# Patient Record
Sex: MALE | Race: WHITE | Employment: UNEMPLOYED | URBAN - METROPOLITAN AREA
[De-identification: names, ages, dates, MRNs, and addresses within clinical notes are randomized per-mention and may not be internally consistent; named-entity substitution may affect disease eponyms.]

---

## 2018-10-30 ENCOUNTER — APPOINTMENT (OUTPATIENT)
Dept: GENERAL RADIOLOGY | Age: 48
DRG: 480 | End: 2018-10-30
Payer: MEDICARE

## 2018-10-30 ENCOUNTER — HOSPITAL ENCOUNTER (EMERGENCY)
Age: 48
Discharge: HOME HEALTH | DRG: 480 | End: 2018-10-30
Attending: EMERGENCY MEDICINE
Payer: MEDICARE

## 2018-10-30 VITALS
SYSTOLIC BLOOD PRESSURE: 113 MMHG | DIASTOLIC BLOOD PRESSURE: 69 MMHG | OXYGEN SATURATION: 98 % | HEART RATE: 66 BPM | BODY MASS INDEX: 18.75 KG/M2 | TEMPERATURE: 97.7 F | RESPIRATION RATE: 14 BRPM | WEIGHT: 150 LBS

## 2018-10-30 DIAGNOSIS — R21 RASH: Primary | ICD-10-CM

## 2018-10-30 DIAGNOSIS — L03.115 CELLULITIS OF RIGHT LOWER EXTREMITY: ICD-10-CM

## 2018-10-30 PROCEDURE — 73560 X-RAY EXAM OF KNEE 1 OR 2: CPT

## 2018-10-30 PROCEDURE — 99284 EMERGENCY DEPT VISIT MOD MDM: CPT

## 2018-10-30 RX ORDER — CEPHALEXIN 500 MG/1
500 CAPSULE ORAL 4 TIMES DAILY
Qty: 40 CAPSULE | Refills: 0 | Status: ON HOLD | OUTPATIENT
Start: 2018-10-30 | End: 2018-11-07 | Stop reason: HOSPADM

## 2018-10-30 ASSESSMENT — ENCOUNTER SYMPTOMS
CONSTIPATION: 0
COLOR CHANGE: 0
DIARRHEA: 0
COUGH: 0
ABDOMINAL DISTENTION: 0
SHORTNESS OF BREATH: 0
STRIDOR: 0
VOMITING: 0
WHEEZING: 0

## 2018-10-30 NOTE — ED NOTES
Bed: 08  Expected date:   Expected time:   Means of arrival: [facility]  Comments:  Medic 225-MRDD  Nonverbal weakness         Priyanka Christian RN  10/30/18 1960

## 2018-10-30 NOTE — ED PROVIDER NOTES
Pulses:       Femoral pulses are 2+ on the right side, and 2+ on the left side. Dorsalis pedis pulses are 2+ on the right side, and 2+ on the left side. Posterior tibial pulses are 2+ on the right side, and 2+ on the left side. Pulmonary/Chest: Effort normal and breath sounds normal. No stridor. Abdominal: Soft. Bowel sounds are normal. He exhibits no distension. There is no tenderness. Musculoskeletal: Normal range of motion. Lymphadenopathy:     He has no cervical adenopathy. Neurological: He is alert and oriented to person, place, and time. Skin: Skin is warm and dry. Capillary refill takes less than 2 seconds. Burn and rash noted. No bruising, no ecchymosis, no laceration, no lesion and no purpura noted. Rash is not nodular, not pustular and not vesicular. He is not diaphoretic. There is erythema (well demarcated rash behind the right knee and lightly disseminated on the right medial/anterior aspect). No cyanosis. No pallor. Nails show no clubbing. Light chronic appearing discoloration present on both feet. No tenderness to palpation. No extremity edema, posterior calf or thigh tenderness, palpable cord, discoloration. Psychiatric: He has a normal mood and affect. His behavior is normal.   Nursing note and vitals reviewed. DIAGNOSTIC RESULTS   LABS:    Labs Reviewed - No data to display    All other labs were within normal range or not returned as of this dictation. EKG: All EKG's are interpreted by the Emergency Department Physician who either signs orCo-signs this chart in the absence of a cardiologist.  Please see their note for interpretation of EKG.       RADIOLOGY:   Non-plain film images such as CT, Ultrasound and MRI are read by the radiologist. Plain radiographic images are visualized andpreliminarily interpreted by the  ED Provider with the below findings:        Interpretation pert Radiologist below, if available at the time of this note:    No orders to

## 2018-11-02 ENCOUNTER — APPOINTMENT (OUTPATIENT)
Dept: GENERAL RADIOLOGY | Age: 48
DRG: 480 | End: 2018-11-02
Payer: MEDICARE

## 2018-11-02 ENCOUNTER — ANESTHESIA EVENT (OUTPATIENT)
Dept: OPERATING ROOM | Age: 48
DRG: 480 | End: 2018-11-02
Payer: MEDICARE

## 2018-11-02 ENCOUNTER — HOSPITAL ENCOUNTER (INPATIENT)
Age: 48
LOS: 6 days | Discharge: NURSING, REHAB OR LONG-TERM CARE | DRG: 480 | End: 2018-11-08
Attending: EMERGENCY MEDICINE | Admitting: FAMILY MEDICINE
Payer: MEDICARE

## 2018-11-02 DIAGNOSIS — S72.001A CLOSED FRACTURE OF RIGHT HIP, INITIAL ENCOUNTER (HCC): Primary | ICD-10-CM

## 2018-11-02 DIAGNOSIS — F72 MENTAL RETARDATION, SEVERE (I.Q. 20-34): ICD-10-CM

## 2018-11-02 PROBLEM — S72.061A: Status: ACTIVE | Noted: 2018-11-02

## 2018-11-02 LAB
ANION GAP SERPL CALCULATED.3IONS-SCNC: 4 MMOL/L (ref 3–16)
BASOPHILS ABSOLUTE: 0.1 K/UL (ref 0–0.2)
BASOPHILS RELATIVE PERCENT: 0.4 %
BUN BLDV-MCNC: 49 MG/DL (ref 7–20)
CALCIUM SERPL-MCNC: 8.7 MG/DL (ref 8.3–10.6)
CHLORIDE BLD-SCNC: 108 MMOL/L (ref 99–110)
CO2: 31 MMOL/L (ref 21–32)
CREAT SERPL-MCNC: 1 MG/DL (ref 0.9–1.3)
EOSINOPHILS ABSOLUTE: 0 K/UL (ref 0–0.6)
EOSINOPHILS RELATIVE PERCENT: 0 %
GFR AFRICAN AMERICAN: >60
GFR NON-AFRICAN AMERICAN: >60
GLUCOSE BLD-MCNC: 144 MG/DL (ref 70–99)
HCT VFR BLD CALC: 35.7 % (ref 40.5–52.5)
HEMOGLOBIN: 11.7 G/DL (ref 13.5–17.5)
LYMPHOCYTES ABSOLUTE: 0.3 K/UL (ref 1–5.1)
LYMPHOCYTES RELATIVE PERCENT: 2.1 %
MCH RBC QN AUTO: 31.9 PG (ref 26–34)
MCHC RBC AUTO-ENTMCNC: 32.9 G/DL (ref 31–36)
MCV RBC AUTO: 96.9 FL (ref 80–100)
MONOCYTES ABSOLUTE: 1.2 K/UL (ref 0–1.3)
MONOCYTES RELATIVE PERCENT: 8.2 %
NEUTROPHILS ABSOLUTE: 13 K/UL (ref 1.7–7.7)
NEUTROPHILS RELATIVE PERCENT: 89.3 %
PDW BLD-RTO: 14.2 % (ref 12.4–15.4)
PLATELET # BLD: 122 K/UL (ref 135–450)
PMV BLD AUTO: 12.7 FL (ref 5–10.5)
POTASSIUM SERPL-SCNC: 4 MMOL/L (ref 3.5–5.1)
RBC # BLD: 3.68 M/UL (ref 4.2–5.9)
SODIUM BLD-SCNC: 143 MMOL/L (ref 136–145)
WBC # BLD: 14.5 K/UL (ref 4–11)

## 2018-11-02 PROCEDURE — 73502 X-RAY EXAM HIP UNI 2-3 VIEWS: CPT

## 2018-11-02 PROCEDURE — 2580000003 HC RX 258: Performed by: PHYSICIAN ASSISTANT

## 2018-11-02 PROCEDURE — 96374 THER/PROPH/DIAG INJ IV PUSH: CPT

## 2018-11-02 PROCEDURE — 80048 BASIC METABOLIC PNL TOTAL CA: CPT

## 2018-11-02 PROCEDURE — 96361 HYDRATE IV INFUSION ADD-ON: CPT

## 2018-11-02 PROCEDURE — 96375 TX/PRO/DX INJ NEW DRUG ADDON: CPT

## 2018-11-02 PROCEDURE — 6360000002 HC RX W HCPCS: Performed by: PHYSICIAN ASSISTANT

## 2018-11-02 PROCEDURE — 93005 ELECTROCARDIOGRAM TRACING: CPT | Performed by: PHYSICIAN ASSISTANT

## 2018-11-02 PROCEDURE — 99285 EMERGENCY DEPT VISIT HI MDM: CPT

## 2018-11-02 PROCEDURE — 96376 TX/PRO/DX INJ SAME DRUG ADON: CPT

## 2018-11-02 PROCEDURE — 71045 X-RAY EXAM CHEST 1 VIEW: CPT

## 2018-11-02 PROCEDURE — 2580000003 HC RX 258: Performed by: FAMILY MEDICINE

## 2018-11-02 PROCEDURE — 6370000000 HC RX 637 (ALT 250 FOR IP): Performed by: FAMILY MEDICINE

## 2018-11-02 PROCEDURE — 36415 COLL VENOUS BLD VENIPUNCTURE: CPT

## 2018-11-02 PROCEDURE — 1200000000 HC SEMI PRIVATE

## 2018-11-02 PROCEDURE — 85025 COMPLETE CBC W/AUTO DIFF WBC: CPT

## 2018-11-02 RX ORDER — LEVETIRACETAM 100 MG/ML
500 SOLUTION ORAL 2 TIMES DAILY
Status: DISCONTINUED | OUTPATIENT
Start: 2018-11-02 | End: 2018-11-08 | Stop reason: HOSPADM

## 2018-11-02 RX ORDER — 0.9 % SODIUM CHLORIDE 0.9 %
1000 INTRAVENOUS SOLUTION INTRAVENOUS ONCE
Status: COMPLETED | OUTPATIENT
Start: 2018-11-02 | End: 2018-11-02

## 2018-11-02 RX ORDER — MIRTAZAPINE 30 MG/1
30 TABLET, FILM COATED ORAL NIGHTLY
COMMUNITY

## 2018-11-02 RX ORDER — ACETAMINOPHEN 500 MG
500 TABLET ORAL EVERY 6 HOURS PRN
Status: DISCONTINUED | OUTPATIENT
Start: 2018-11-02 | End: 2018-11-08 | Stop reason: HOSPADM

## 2018-11-02 RX ORDER — TAMSULOSIN HYDROCHLORIDE 0.4 MG/1
0.4 CAPSULE ORAL DAILY
Status: DISCONTINUED | OUTPATIENT
Start: 2018-11-03 | End: 2018-11-08 | Stop reason: HOSPADM

## 2018-11-02 RX ORDER — LACTULOSE 10 G/15ML
20 SOLUTION ORAL 2 TIMES DAILY
Status: DISCONTINUED | OUTPATIENT
Start: 2018-11-02 | End: 2018-11-08 | Stop reason: HOSPADM

## 2018-11-02 RX ORDER — TRAZODONE HYDROCHLORIDE 150 MG/1
150 TABLET ORAL NIGHTLY
Status: DISCONTINUED | OUTPATIENT
Start: 2018-11-02 | End: 2018-11-02 | Stop reason: ALTCHOICE

## 2018-11-02 RX ORDER — DIVALPROEX SODIUM 125 MG/1
500 CAPSULE, COATED PELLETS ORAL DAILY
Status: DISCONTINUED | OUTPATIENT
Start: 2018-11-03 | End: 2018-11-08 | Stop reason: HOSPADM

## 2018-11-02 RX ORDER — CLONAZEPAM 1 MG/1
0.5 TABLET ORAL 2 TIMES DAILY
Status: DISCONTINUED | OUTPATIENT
Start: 2018-11-02 | End: 2018-11-08 | Stop reason: HOSPADM

## 2018-11-02 RX ORDER — ONDANSETRON 2 MG/ML
4 INJECTION INTRAMUSCULAR; INTRAVENOUS ONCE
Status: COMPLETED | OUTPATIENT
Start: 2018-11-02 | End: 2018-11-02

## 2018-11-02 RX ORDER — SODIUM CHLORIDE 0.9 % (FLUSH) 0.9 %
10 SYRINGE (ML) INJECTION PRN
Status: DISCONTINUED | OUTPATIENT
Start: 2018-11-02 | End: 2018-11-08 | Stop reason: HOSPADM

## 2018-11-02 RX ORDER — CLONAZEPAM 1 MG/1
1 TABLET ORAL NIGHTLY
Status: DISCONTINUED | OUTPATIENT
Start: 2018-11-02 | End: 2018-11-02

## 2018-11-02 RX ORDER — LORAZEPAM 2 MG/ML
1 INJECTION INTRAMUSCULAR ONCE
Status: COMPLETED | OUTPATIENT
Start: 2018-11-02 | End: 2018-11-02

## 2018-11-02 RX ORDER — ARIPIPRAZOLE 5 MG/1
10 TABLET ORAL DAILY
Status: DISCONTINUED | OUTPATIENT
Start: 2018-11-03 | End: 2018-11-08 | Stop reason: HOSPADM

## 2018-11-02 RX ORDER — MORPHINE SULFATE 2 MG/ML
2 INJECTION, SOLUTION INTRAMUSCULAR; INTRAVENOUS EVERY 4 HOURS PRN
Status: DISCONTINUED | OUTPATIENT
Start: 2018-11-02 | End: 2018-11-03

## 2018-11-02 RX ORDER — ONDANSETRON 2 MG/ML
4 INJECTION INTRAMUSCULAR; INTRAVENOUS EVERY 6 HOURS PRN
Status: DISCONTINUED | OUTPATIENT
Start: 2018-11-02 | End: 2018-11-08 | Stop reason: HOSPADM

## 2018-11-02 RX ORDER — MORPHINE SULFATE 4 MG/ML
4 INJECTION, SOLUTION INTRAMUSCULAR; INTRAVENOUS ONCE
Status: COMPLETED | OUTPATIENT
Start: 2018-11-02 | End: 2018-11-02

## 2018-11-02 RX ORDER — SODIUM CHLORIDE 0.9 % (FLUSH) 0.9 %
10 SYRINGE (ML) INJECTION EVERY 12 HOURS SCHEDULED
Status: DISCONTINUED | OUTPATIENT
Start: 2018-11-02 | End: 2018-11-08 | Stop reason: HOSPADM

## 2018-11-02 RX ORDER — CARBAMAZEPINE 200 MG/1
200 TABLET ORAL 2 TIMES DAILY
Status: DISCONTINUED | OUTPATIENT
Start: 2018-11-02 | End: 2018-11-08 | Stop reason: HOSPADM

## 2018-11-02 RX ORDER — RISPERIDONE 1 MG/1
2 TABLET, FILM COATED ORAL NIGHTLY
Status: DISCONTINUED | OUTPATIENT
Start: 2018-11-02 | End: 2018-11-02 | Stop reason: ALTCHOICE

## 2018-11-02 RX ORDER — CEFAZOLIN SODIUM 2 G/100ML
2 INJECTION, SOLUTION INTRAVENOUS
Status: DISCONTINUED | OUTPATIENT
Start: 2018-11-02 | End: 2018-11-03

## 2018-11-02 RX ORDER — LEVOTHYROXINE SODIUM 112 UG/1
112 TABLET ORAL DAILY
Status: DISCONTINUED | OUTPATIENT
Start: 2018-11-03 | End: 2018-11-08 | Stop reason: HOSPADM

## 2018-11-02 RX ADMIN — ACETAMINOPHEN 500 MG: 500 TABLET, FILM COATED ORAL at 23:50

## 2018-11-02 RX ADMIN — SODIUM CHLORIDE 1000 ML: 9 INJECTION, SOLUTION INTRAVENOUS at 19:38

## 2018-11-02 RX ADMIN — CLONAZEPAM 0.5 MG: 1 TABLET ORAL at 23:50

## 2018-11-02 RX ADMIN — LEVETIRACETAM 500 MG: 100 SOLUTION ORAL at 23:51

## 2018-11-02 RX ADMIN — HYDROMORPHONE HYDROCHLORIDE 0.5 MG: 1 INJECTION, SOLUTION INTRAMUSCULAR; INTRAVENOUS; SUBCUTANEOUS at 19:38

## 2018-11-02 RX ADMIN — ONDANSETRON 4 MG: 2 INJECTION INTRAMUSCULAR; INTRAVENOUS at 18:23

## 2018-11-02 RX ADMIN — CARBAMAZEPINE 200 MG: 200 TABLET ORAL at 23:50

## 2018-11-02 RX ADMIN — MORPHINE SULFATE 4 MG: 4 INJECTION INTRAVENOUS at 18:24

## 2018-11-02 RX ADMIN — LACTULOSE 20 G: 20 SOLUTION ORAL at 23:52

## 2018-11-02 RX ADMIN — Medication 10 ML: at 23:53

## 2018-11-02 RX ADMIN — ONDANSETRON 4 MG: 2 INJECTION INTRAMUSCULAR; INTRAVENOUS at 19:38

## 2018-11-02 RX ADMIN — LORAZEPAM 1 MG: 2 INJECTION INTRAMUSCULAR; INTRAVENOUS at 20:18

## 2018-11-02 ASSESSMENT — PAIN SCALES - GENERAL
PAINLEVEL_OUTOF10: 9
PAINLEVEL_OUTOF10: 3
PAINLEVEL_OUTOF10: 0

## 2018-11-02 NOTE — ED NOTES
Bed: 05  Expected date:   Expected time:   Means of arrival:   Comments:  [address], RN  11/02/18 [phone]

## 2018-11-02 NOTE — LETTER
Beneficiary Notification Letter     This [facility] Provider is Participating in an Innovative Payment and [address] [place] [address] [place] from Jerry Montague:   [facility] is participating in a Medicare initiative called the [facility] for [address] [facility]. You are receiving this letter because your health care provider has identified you as a patient who is receiving care through this initiative. Health care providers participating in the [facility] [address] [facility], including [facility], will work with Medicare to improve care for patients. Your Medicare rights have not been changed. You still have all the same Medicare rights and protections, including the right to choose which hospital, doctor, or other health care provider you see. However, because [facility] chose to participate in the [facility], all Medicare beneficiaries who meet the eligibility criteria of this initiative will receive care under the initiative. If you do not wish to receive care under the Bundled Payments [facility] [address] [facility], you must choose a health care provider that does not participate in this initiative for your care. Regardless of which health care provider you see, Medicare will continue to cover all of your medically necessary services. Bundled Payments for Care Improvement Advanced aims to help improve your care     The Bundled Payments [facility] [address] [facility] is an innovative Medicare initiative that encourages your doctors, hospitals, and other health care providers to work more closely together so you get better care during and following certain hospital stays.  In this initiative, doctors and hospitals may work closely with certain health care providers and suppliers that help patients recover after discharge from the hospital, · To find a different doctor, visit Medicare's Physician Compare website, HDTapes.co.nz, or call 1-800-MEDICARE (767 1042). TTY users should call 7-577.435.2217. · To find a different skilled nursing facility, visit [facility] Medico website, https://www.OuiCar/, or call 1-800-MEDICARE (1- 968.665.9597). TTY users should call 7-244.291.4214. · To find a different long term care hospital, visit [facility] Compare website, Smellology.be, or call 1-800- MEDICARE (956 8387). TTY users should call 1-248.927.9822. · To find a different inpatient rehabilitation facility, visit [facility] Compare website, www.medicare.gov/ inpatientrehabilitation facilitycompare, or call 1-800-MEDICARE (0-279.408.8470). TTY users should call 1- 955.848.7324. · To find a different home health agency, visit [facility] website, www.medicare.gov/homehealthcompare, or call 1-800-MEDICARE (6-770- 153-8717). TTY users should call [phone].

## 2018-11-03 ENCOUNTER — ANESTHESIA (OUTPATIENT)
Dept: OPERATING ROOM | Age: 48
DRG: 480 | End: 2018-11-03
Payer: MEDICARE

## 2018-11-03 ENCOUNTER — APPOINTMENT (OUTPATIENT)
Dept: GENERAL RADIOLOGY | Age: 48
DRG: 480 | End: 2018-11-03
Payer: MEDICARE

## 2018-11-03 VITALS
OXYGEN SATURATION: 100 % | SYSTOLIC BLOOD PRESSURE: 121 MMHG | RESPIRATION RATE: 2 BRPM | DIASTOLIC BLOOD PRESSURE: 74 MMHG

## 2018-11-03 PROBLEM — S72.141A CLOSED DISPLACED INTERTROCHANTERIC FRACTURE OF RIGHT FEMUR (HCC): Status: ACTIVE | Noted: 2018-11-02

## 2018-11-03 LAB
A/G RATIO: 0.8 (ref 1.1–2.2)
ABO/RH: NORMAL
ALBUMIN SERPL-MCNC: 2.7 G/DL (ref 3.4–5)
ALP BLD-CCNC: 50 U/L (ref 40–129)
ALT SERPL-CCNC: 16 U/L (ref 10–40)
ANION GAP SERPL CALCULATED.3IONS-SCNC: 7 MMOL/L (ref 3–16)
ANTIBODY SCREEN: NORMAL
AST SERPL-CCNC: 30 U/L (ref 15–37)
BILIRUB SERPL-MCNC: 0.4 MG/DL (ref 0–1)
BILIRUBIN URINE: NEGATIVE
BLOOD, URINE: NEGATIVE
BUN BLDV-MCNC: 38 MG/DL (ref 7–20)
CALCIUM SERPL-MCNC: 8.4 MG/DL (ref 8.3–10.6)
CHLORIDE BLD-SCNC: 109 MMOL/L (ref 99–110)
CLARITY: CLEAR
CO2: 32 MMOL/L (ref 21–32)
COLOR: YELLOW
CREAT SERPL-MCNC: 0.8 MG/DL (ref 0.9–1.3)
EPITHELIAL CELLS, UA: 1 /HPF (ref 0–5)
GFR AFRICAN AMERICAN: >60
GFR NON-AFRICAN AMERICAN: >60
GLOBULIN: 3.4 G/DL
GLUCOSE BLD-MCNC: 107 MG/DL (ref 70–99)
GLUCOSE URINE: NEGATIVE MG/DL
HYALINE CASTS: 5 /LPF (ref 0–8)
KETONES, URINE: NEGATIVE MG/DL
LEUKOCYTE ESTERASE, URINE: NEGATIVE
MICROSCOPIC EXAMINATION: YES
NITRITE, URINE: NEGATIVE
PH UA: 5.5
POTASSIUM SERPL-SCNC: 4.1 MMOL/L (ref 3.5–5.1)
PREALBUMIN: 7.3 MG/DL (ref 20–40)
PROTEIN UA: 30 MG/DL
RBC UA: 2 /HPF (ref 0–4)
SODIUM BLD-SCNC: 148 MMOL/L (ref 136–145)
SPECIFIC GRAVITY UA: 1.03
TOTAL PROTEIN: 6.1 G/DL (ref 6.4–8.2)
URINE REFLEX TO CULTURE: ABNORMAL
URINE TYPE: ABNORMAL
UROBILINOGEN, URINE: 1 E.U./DL
WBC UA: 1 /HPF (ref 0–5)

## 2018-11-03 PROCEDURE — 7100000000 HC PACU RECOVERY - FIRST 15 MIN: Performed by: ORTHOPAEDIC SURGERY

## 2018-11-03 PROCEDURE — 3700000000 HC ANESTHESIA ATTENDED CARE: Performed by: ORTHOPAEDIC SURGERY

## 2018-11-03 PROCEDURE — 6360000002 HC RX W HCPCS: Performed by: HOSPITALIST

## 2018-11-03 PROCEDURE — 6360000002 HC RX W HCPCS: Performed by: NURSE ANESTHETIST, CERTIFIED REGISTERED

## 2018-11-03 PROCEDURE — 86900 BLOOD TYPING SEROLOGIC ABO: CPT

## 2018-11-03 PROCEDURE — 2500000003 HC RX 250 WO HCPCS: Performed by: HOSPITALIST

## 2018-11-03 PROCEDURE — 84134 ASSAY OF PREALBUMIN: CPT

## 2018-11-03 PROCEDURE — 0QS606Z REPOSITION RIGHT UPPER FEMUR WITH INTRAMEDULLARY INTERNAL FIXATION DEVICE, OPEN APPROACH: ICD-10-PCS | Performed by: ORTHOPAEDIC SURGERY

## 2018-11-03 PROCEDURE — 2580000003 HC RX 258: Performed by: NURSE PRACTITIONER

## 2018-11-03 PROCEDURE — 6360000002 HC RX W HCPCS: Performed by: FAMILY MEDICINE

## 2018-11-03 PROCEDURE — 6370000000 HC RX 637 (ALT 250 FOR IP): Performed by: ORTHOPAEDIC SURGERY

## 2018-11-03 PROCEDURE — 1200000000 HC SEMI PRIVATE

## 2018-11-03 PROCEDURE — 7100000001 HC PACU RECOVERY - ADDTL 15 MIN: Performed by: ORTHOPAEDIC SURGERY

## 2018-11-03 PROCEDURE — 86901 BLOOD TYPING SEROLOGIC RH(D): CPT

## 2018-11-03 PROCEDURE — 73502 X-RAY EXAM HIP UNI 2-3 VIEWS: CPT

## 2018-11-03 PROCEDURE — C1769 GUIDE WIRE: HCPCS | Performed by: ORTHOPAEDIC SURGERY

## 2018-11-03 PROCEDURE — 2709999900 HC NON-CHARGEABLE SUPPLY: Performed by: ORTHOPAEDIC SURGERY

## 2018-11-03 PROCEDURE — 3209999900 FLUORO FOR SURGICAL PROCEDURES

## 2018-11-03 PROCEDURE — 3700000001 HC ADD 15 MINUTES (ANESTHESIA): Performed by: ORTHOPAEDIC SURGERY

## 2018-11-03 PROCEDURE — 81001 URINALYSIS AUTO W/SCOPE: CPT

## 2018-11-03 PROCEDURE — 2580000003 HC RX 258: Performed by: NURSE ANESTHETIST, CERTIFIED REGISTERED

## 2018-11-03 PROCEDURE — 86850 RBC ANTIBODY SCREEN: CPT

## 2018-11-03 PROCEDURE — 2720000010 HC SURG SUPPLY STERILE: Performed by: ORTHOPAEDIC SURGERY

## 2018-11-03 PROCEDURE — 3600000014 HC SURGERY LEVEL 4 ADDTL 15MIN: Performed by: ORTHOPAEDIC SURGERY

## 2018-11-03 PROCEDURE — C1713 ANCHOR/SCREW BN/BN,TIS/BN: HCPCS | Performed by: ORTHOPAEDIC SURGERY

## 2018-11-03 PROCEDURE — 6360000002 HC RX W HCPCS: Performed by: NURSE PRACTITIONER

## 2018-11-03 PROCEDURE — 2580000003 HC RX 258: Performed by: ORTHOPAEDIC SURGERY

## 2018-11-03 PROCEDURE — 2580000003 HC RX 258: Performed by: HOSPITALIST

## 2018-11-03 PROCEDURE — 36415 COLL VENOUS BLD VENIPUNCTURE: CPT

## 2018-11-03 PROCEDURE — 3600000004 HC SURGERY LEVEL 4 BASE: Performed by: ORTHOPAEDIC SURGERY

## 2018-11-03 PROCEDURE — 80053 COMPREHEN METABOLIC PANEL: CPT

## 2018-11-03 PROCEDURE — 6370000000 HC RX 637 (ALT 250 FOR IP): Performed by: FAMILY MEDICINE

## 2018-11-03 PROCEDURE — 6360000002 HC RX W HCPCS: Performed by: ANESTHESIOLOGY

## 2018-11-03 PROCEDURE — 93010 ELECTROCARDIOGRAM REPORT: CPT | Performed by: INTERNAL MEDICINE

## 2018-11-03 DEVICE — LOCKING SCREW, FULLY THREADED: Type: IMPLANTABLE DEVICE | Site: HIP | Status: FUNCTIONAL

## 2018-11-03 DEVICE — LONG NAIL KIT R1.5, TI, RIGHT
Type: IMPLANTABLE DEVICE | Site: HIP | Status: FUNCTIONAL
Brand: GAMMA

## 2018-11-03 DEVICE — LAG SCREW, TI
Type: IMPLANTABLE DEVICE | Site: HIP | Status: FUNCTIONAL
Brand: GAMMA

## 2018-11-03 RX ORDER — DIPHENHYDRAMINE HYDROCHLORIDE 50 MG/ML
12.5 INJECTION INTRAMUSCULAR; INTRAVENOUS
Status: DISCONTINUED | OUTPATIENT
Start: 2018-11-03 | End: 2018-11-03 | Stop reason: HOSPADM

## 2018-11-03 RX ORDER — CEFAZOLIN SODIUM 1 G/3ML
INJECTION, POWDER, FOR SOLUTION INTRAMUSCULAR; INTRAVENOUS PRN
Status: DISCONTINUED | OUTPATIENT
Start: 2018-11-03 | End: 2018-11-03 | Stop reason: SDUPTHER

## 2018-11-03 RX ORDER — MULTIVITAMIN WITH FOLIC ACID 400 MCG
1 TABLET ORAL DAILY
Status: DISCONTINUED | OUTPATIENT
Start: 2018-11-03 | End: 2018-11-08 | Stop reason: HOSPADM

## 2018-11-03 RX ORDER — DEXTROSE, SODIUM CHLORIDE, AND POTASSIUM CHLORIDE 5; .9; .15 G/100ML; G/100ML; G/100ML
INJECTION INTRAVENOUS CONTINUOUS
Status: DISCONTINUED | OUTPATIENT
Start: 2018-11-03 | End: 2018-11-04

## 2018-11-03 RX ORDER — SODIUM CHLORIDE 0.9 % (FLUSH) 0.9 %
10 SYRINGE (ML) INJECTION PRN
Status: DISCONTINUED | OUTPATIENT
Start: 2018-11-03 | End: 2018-11-03 | Stop reason: HOSPADM

## 2018-11-03 RX ORDER — PROPOFOL 10 MG/ML
INJECTION, EMULSION INTRAVENOUS PRN
Status: DISCONTINUED | OUTPATIENT
Start: 2018-11-03 | End: 2018-11-03 | Stop reason: SDUPTHER

## 2018-11-03 RX ORDER — LORAZEPAM 2 MG/ML
0.5 INJECTION INTRAMUSCULAR EVERY 6 HOURS PRN
Status: DISCONTINUED | OUTPATIENT
Start: 2018-11-03 | End: 2018-11-04

## 2018-11-03 RX ORDER — DOCUSATE SODIUM 100 MG/1
100 CAPSULE, LIQUID FILLED ORAL 2 TIMES DAILY
Status: DISCONTINUED | OUTPATIENT
Start: 2018-11-03 | End: 2018-11-08 | Stop reason: HOSPADM

## 2018-11-03 RX ORDER — LIDOCAINE HYDROCHLORIDE 10 MG/ML
1 INJECTION, SOLUTION EPIDURAL; INFILTRATION; INTRACAUDAL; PERINEURAL
Status: DISCONTINUED | OUTPATIENT
Start: 2018-11-03 | End: 2018-11-03 | Stop reason: HOSPADM

## 2018-11-03 RX ORDER — HYDROCODONE BITARTRATE AND ACETAMINOPHEN 5; 325 MG/1; MG/1
1 TABLET ORAL PRN
Status: DISCONTINUED | OUTPATIENT
Start: 2018-11-03 | End: 2018-11-03 | Stop reason: HOSPADM

## 2018-11-03 RX ORDER — HYDROMORPHONE HCL 110MG/55ML
0.25 PATIENT CONTROLLED ANALGESIA SYRINGE INTRAVENOUS EVERY 5 MIN PRN
Status: DISCONTINUED | OUTPATIENT
Start: 2018-11-03 | End: 2018-11-03 | Stop reason: HOSPADM

## 2018-11-03 RX ORDER — FENTANYL CITRATE 50 UG/ML
25 INJECTION, SOLUTION INTRAMUSCULAR; INTRAVENOUS EVERY 5 MIN PRN
Status: COMPLETED | OUTPATIENT
Start: 2018-11-03 | End: 2018-11-03

## 2018-11-03 RX ORDER — HYDROMORPHONE HCL 110MG/55ML
0.5 PATIENT CONTROLLED ANALGESIA SYRINGE INTRAVENOUS EVERY 5 MIN PRN
Status: DISCONTINUED | OUTPATIENT
Start: 2018-11-03 | End: 2018-11-03 | Stop reason: HOSPADM

## 2018-11-03 RX ORDER — MEPERIDINE HYDROCHLORIDE 25 MG/ML
12.5 INJECTION INTRAMUSCULAR; INTRAVENOUS; SUBCUTANEOUS EVERY 5 MIN PRN
Status: DISCONTINUED | OUTPATIENT
Start: 2018-11-03 | End: 2018-11-03 | Stop reason: HOSPADM

## 2018-11-03 RX ORDER — SODIUM CHLORIDE, SODIUM LACTATE, POTASSIUM CHLORIDE, CALCIUM CHLORIDE 600; 310; 30; 20 MG/100ML; MG/100ML; MG/100ML; MG/100ML
INJECTION, SOLUTION INTRAVENOUS CONTINUOUS PRN
Status: DISCONTINUED | OUTPATIENT
Start: 2018-11-03 | End: 2018-11-03 | Stop reason: SDUPTHER

## 2018-11-03 RX ORDER — SODIUM CHLORIDE 0.9 % (FLUSH) 0.9 %
10 SYRINGE (ML) INJECTION EVERY 12 HOURS SCHEDULED
Status: DISCONTINUED | OUTPATIENT
Start: 2018-11-03 | End: 2018-11-03 | Stop reason: HOSPADM

## 2018-11-03 RX ORDER — FENTANYL CITRATE 50 UG/ML
50 INJECTION, SOLUTION INTRAMUSCULAR; INTRAVENOUS EVERY 5 MIN PRN
Status: DISCONTINUED | OUTPATIENT
Start: 2018-11-03 | End: 2018-11-03 | Stop reason: HOSPADM

## 2018-11-03 RX ORDER — PROMETHAZINE HYDROCHLORIDE 25 MG/ML
6.25 INJECTION, SOLUTION INTRAMUSCULAR; INTRAVENOUS EVERY 30 MIN PRN
Status: DISCONTINUED | OUTPATIENT
Start: 2018-11-03 | End: 2018-11-03 | Stop reason: HOSPADM

## 2018-11-03 RX ORDER — SODIUM CHLORIDE 9 MG/ML
INJECTION, SOLUTION INTRAVENOUS CONTINUOUS
Status: DISCONTINUED | OUTPATIENT
Start: 2018-11-03 | End: 2018-11-03

## 2018-11-03 RX ORDER — MAGNESIUM HYDROXIDE 1200 MG/15ML
LIQUID ORAL CONTINUOUS PRN
Status: COMPLETED | OUTPATIENT
Start: 2018-11-03 | End: 2018-11-03

## 2018-11-03 RX ORDER — FENTANYL CITRATE 50 UG/ML
INJECTION, SOLUTION INTRAMUSCULAR; INTRAVENOUS PRN
Status: DISCONTINUED | OUTPATIENT
Start: 2018-11-03 | End: 2018-11-03 | Stop reason: SDUPTHER

## 2018-11-03 RX ORDER — FAMOTIDINE 20 MG/1
20 TABLET, FILM COATED ORAL 2 TIMES DAILY
Status: DISCONTINUED | OUTPATIENT
Start: 2018-11-03 | End: 2018-11-08 | Stop reason: HOSPADM

## 2018-11-03 RX ORDER — SODIUM CHLORIDE, SODIUM LACTATE, POTASSIUM CHLORIDE, CALCIUM CHLORIDE 600; 310; 30; 20 MG/100ML; MG/100ML; MG/100ML; MG/100ML
INJECTION, SOLUTION INTRAVENOUS CONTINUOUS
Status: DISCONTINUED | OUTPATIENT
Start: 2018-11-03 | End: 2018-11-04

## 2018-11-03 RX ORDER — HYDROCODONE BITARTRATE AND ACETAMINOPHEN 5; 325 MG/1; MG/1
2 TABLET ORAL PRN
Status: DISCONTINUED | OUTPATIENT
Start: 2018-11-03 | End: 2018-11-03 | Stop reason: HOSPADM

## 2018-11-03 RX ADMIN — CEFAZOLIN 2000 MG: 1 INJECTION, POWDER, FOR SOLUTION INTRAVENOUS at 13:36

## 2018-11-03 RX ADMIN — LEVETIRACETAM 500 MG: 100 SOLUTION ORAL at 11:17

## 2018-11-03 RX ADMIN — CARBAMAZEPINE 200 MG: 200 TABLET ORAL at 23:47

## 2018-11-03 RX ADMIN — PROPOFOL 100 MG: 10 INJECTION, EMULSION INTRAVENOUS at 13:29

## 2018-11-03 RX ADMIN — CLONAZEPAM 0.5 MG: 1 TABLET ORAL at 11:18

## 2018-11-03 RX ADMIN — LORAZEPAM 0.5 MG: 2 INJECTION INTRAMUSCULAR; INTRAVENOUS at 03:46

## 2018-11-03 RX ADMIN — DOCUSATE SODIUM 100 MG: 100 CAPSULE, LIQUID FILLED ORAL at 20:05

## 2018-11-03 RX ADMIN — FENTANYL CITRATE 25 MCG: 50 INJECTION INTRAMUSCULAR; INTRAVENOUS at 14:52

## 2018-11-03 RX ADMIN — LACTULOSE 20 G: 20 SOLUTION ORAL at 20:11

## 2018-11-03 RX ADMIN — SODIUM CHLORIDE, POTASSIUM CHLORIDE, SODIUM LACTATE AND CALCIUM CHLORIDE: 600; 310; 30; 20 INJECTION, SOLUTION INTRAVENOUS at 01:48

## 2018-11-03 RX ADMIN — ASPIRIN 325 MG: 325 TABLET, DELAYED RELEASE ORAL at 20:09

## 2018-11-03 RX ADMIN — SODIUM CHLORIDE, POTASSIUM CHLORIDE, SODIUM LACTATE AND CALCIUM CHLORIDE: 600; 310; 30; 20 INJECTION, SOLUTION INTRAVENOUS at 13:21

## 2018-11-03 RX ADMIN — FENTANYL CITRATE 100 MCG: 50 INJECTION, SOLUTION INTRAMUSCULAR; INTRAVENOUS at 13:29

## 2018-11-03 RX ADMIN — HYDROMORPHONE HYDROCHLORIDE 0.5 MG: 1 INJECTION, SOLUTION INTRAMUSCULAR; INTRAVENOUS; SUBCUTANEOUS at 20:03

## 2018-11-03 RX ADMIN — LEVETIRACETAM 500 MG: 100 SOLUTION ORAL at 20:10

## 2018-11-03 RX ADMIN — LORAZEPAM 0.5 MG: 2 INJECTION INTRAMUSCULAR; INTRAVENOUS at 09:50

## 2018-11-03 RX ADMIN — ARIPIPRAZOLE 10 MG: 5 TABLET ORAL at 11:17

## 2018-11-03 RX ADMIN — SODIUM CHLORIDE, POTASSIUM CHLORIDE, SODIUM LACTATE AND CALCIUM CHLORIDE: 600; 310; 30; 20 INJECTION, SOLUTION INTRAVENOUS at 11:22

## 2018-11-03 RX ADMIN — CLONAZEPAM 0.5 MG: 1 TABLET ORAL at 20:05

## 2018-11-03 RX ADMIN — HYDROMORPHONE HYDROCHLORIDE 0.5 MG: 1 INJECTION, SOLUTION INTRAMUSCULAR; INTRAVENOUS; SUBCUTANEOUS at 08:46

## 2018-11-03 RX ADMIN — FENTANYL CITRATE 25 MCG: 50 INJECTION INTRAMUSCULAR; INTRAVENOUS at 14:58

## 2018-11-03 RX ADMIN — CARBAMAZEPINE 200 MG: 200 TABLET ORAL at 11:17

## 2018-11-03 RX ADMIN — FENTANYL CITRATE 50 MCG: 50 INJECTION, SOLUTION INTRAMUSCULAR; INTRAVENOUS at 13:45

## 2018-11-03 RX ADMIN — MORPHINE SULFATE 2 MG: 2 INJECTION, SOLUTION INTRAMUSCULAR; INTRAVENOUS at 01:48

## 2018-11-03 RX ADMIN — THERA TABS 1 TABLET: TAB at 18:01

## 2018-11-03 RX ADMIN — DIVALPROEX SODIUM 500 MG: 125 CAPSULE, COATED PELLETS ORAL at 11:18

## 2018-11-03 RX ADMIN — CALCIUM GLUCONATE 2 G: 94 INJECTION, SOLUTION INTRAVENOUS at 17:26

## 2018-11-03 RX ADMIN — LORAZEPAM 0.5 MG: 2 INJECTION INTRAMUSCULAR; INTRAVENOUS at 21:27

## 2018-11-03 RX ADMIN — FENTANYL CITRATE 25 MCG: 50 INJECTION INTRAMUSCULAR; INTRAVENOUS at 15:16

## 2018-11-03 RX ADMIN — HYDROMORPHONE HYDROCHLORIDE 0.5 MG: 1 INJECTION, SOLUTION INTRAMUSCULAR; INTRAVENOUS; SUBCUTANEOUS at 16:47

## 2018-11-03 RX ADMIN — LORAZEPAM 0.5 MG: 2 INJECTION INTRAMUSCULAR; INTRAVENOUS at 16:02

## 2018-11-03 RX ADMIN — POTASSIUM CHLORIDE, DEXTROSE MONOHYDRATE AND SODIUM CHLORIDE: 150; 5; 900 INJECTION, SOLUTION INTRAVENOUS at 16:36

## 2018-11-03 RX ADMIN — FAMOTIDINE 20 MG: 20 TABLET ORAL at 20:05

## 2018-11-03 RX ADMIN — FENTANYL CITRATE 25 MCG: 50 INJECTION INTRAMUSCULAR; INTRAVENOUS at 15:05

## 2018-11-03 ASSESSMENT — PULMONARY FUNCTION TESTS
PIF_VALUE: 8
PIF_VALUE: 8
PIF_VALUE: 2
PIF_VALUE: 2
PIF_VALUE: 1
PIF_VALUE: 9
PIF_VALUE: 2
PIF_VALUE: 8
PIF_VALUE: 12
PIF_VALUE: 11
PIF_VALUE: 1
PIF_VALUE: 2
PIF_VALUE: 11
PIF_VALUE: 1
PIF_VALUE: 8
PIF_VALUE: 2
PIF_VALUE: 3
PIF_VALUE: 0
PIF_VALUE: 12
PIF_VALUE: 12
PIF_VALUE: 2
PIF_VALUE: 12
PIF_VALUE: 8
PIF_VALUE: 12
PIF_VALUE: 1
PIF_VALUE: 11
PIF_VALUE: 1
PIF_VALUE: 12
PIF_VALUE: 1
PIF_VALUE: 12
PIF_VALUE: 1
PIF_VALUE: 11
PIF_VALUE: 18
PIF_VALUE: 14
PIF_VALUE: 11
PIF_VALUE: 11
PIF_VALUE: 2
PIF_VALUE: 8
PIF_VALUE: 11
PIF_VALUE: 0
PIF_VALUE: 12
PIF_VALUE: 14
PIF_VALUE: 2
PIF_VALUE: 11
PIF_VALUE: 8
PIF_VALUE: 2
PIF_VALUE: 11
PIF_VALUE: 10
PIF_VALUE: 23
PIF_VALUE: 12
PIF_VALUE: 11
PIF_VALUE: 12
PIF_VALUE: 12
PIF_VALUE: 2
PIF_VALUE: 1
PIF_VALUE: 8
PIF_VALUE: 12
PIF_VALUE: 11
PIF_VALUE: 2
PIF_VALUE: 11
PIF_VALUE: 1
PIF_VALUE: 12
PIF_VALUE: 2
PIF_VALUE: 11
PIF_VALUE: 11
PIF_VALUE: 2
PIF_VALUE: 12
PIF_VALUE: 1
PIF_VALUE: 1
PIF_VALUE: 8
PIF_VALUE: 0
PIF_VALUE: 17
PIF_VALUE: 11
PIF_VALUE: 2
PIF_VALUE: 2
PIF_VALUE: 8
PIF_VALUE: 2
PIF_VALUE: 5
PIF_VALUE: 2

## 2018-11-03 ASSESSMENT — PAIN SCALES - GENERAL
PAINLEVEL_OUTOF10: 10
PAINLEVEL_OUTOF10: 3
PAINLEVEL_OUTOF10: 3
PAINLEVEL_OUTOF10: 0
PAINLEVEL_OUTOF10: 6
PAINLEVEL_OUTOF10: 8
PAINLEVEL_OUTOF10: 7
PAINLEVEL_OUTOF10: 6
PAINLEVEL_OUTOF10: 6
PAINLEVEL_OUTOF10: 7
PAINLEVEL_OUTOF10: 5
PAINLEVEL_OUTOF10: 3
PAINLEVEL_OUTOF10: 5
PAINLEVEL_OUTOF10: 2
PAINLEVEL_OUTOF10: 7
PAINLEVEL_OUTOF10: 0

## 2018-11-03 ASSESSMENT — PAIN DESCRIPTION - ORIENTATION: ORIENTATION: RIGHT

## 2018-11-03 ASSESSMENT — PAIN DESCRIPTION - LOCATION: LOCATION: HIP

## 2018-11-03 ASSESSMENT — PAIN DESCRIPTION - PAIN TYPE
TYPE: ACUTE PAIN
TYPE: SURGICAL PAIN

## 2018-11-03 ASSESSMENT — PAIN - FUNCTIONAL ASSESSMENT: PAIN_FUNCTIONAL_ASSESSMENT: FACES

## 2018-11-03 NOTE — ANESTHESIA PRE PROCEDURE
(CHRONULAC) 10 GM/15ML solution 20 g  20 g Oral BID Katarzyna Vines MD   20 g at 11/02/18 2352    levETIRAcetam (KEPPRA) 100 MG/ML solution 500 mg  500 mg Oral BID Katarzyna Vines MD   500 mg at 11/02/18 2351    levothyroxine (SYNTHROID) tablet 112 mcg  112 mcg Oral Daily Katarzyna Vines MD   Stopped at 11/03/18 0700    tamsulosin (FLOMAX) capsule 0.4 mg  0.4 mg Oral Daily Katarzyna Vines MD        sodium chloride flush 0.9 % injection 10 mL  10 mL Intravenous 2 times per day Katarzyna Vines MD   10 mL at 11/02/18 2353    sodium chloride flush 0.9 % injection 10 mL  10 mL Intravenous PRN Katarzyna Vines MD        magnesium hydroxide (MILK OF MAGNESIA) 400 MG/5ML suspension 30 mL  30 mL Oral Daily PRN Katarzyna Vines MD        ondansetron (ZOFRAN) injection 4 mg  4 mg Intravenous Q6H PRN Katarzyna Vines MD           Allergies: Allergies   Allergen Reactions    No Known Allergies        Problem List:    Patient Active Problem List   Diagnosis Code    Back injury S39. 92XA    Pneumonia J18.9    Acute renal failure (HCC) N17.9    Thrombocytopenia (HCC) H86.0    Metabolic encephalopathy N92.27    Lithium toxicity T56.891A    Severe malnutrition (HCC) E43    Meningitis G03.9    Mental retardation, severe (I.Q. 20-34) F72    Hypothyroid E03.9    Closed displaced intertrochanteric fracture of right femur ([facility] [address] S72.141A       Past Medical History:        Diagnosis Date    BPH (benign prostatic hyperplasia)     Dysphagia     GERD (gastroesophageal reflux disease)     Insomnia     Mental retardation, severe (I.Q. 20-34)     Schizophrenia (HCC)     Seizures (HCC)     Thyroid disease     Unspecified diseases of blood and blood-forming organs     thrombocytopenia       Past Surgical History:        Procedure Laterality Date    FRACTURE SURGERY Right     plates/screws       Social History:    Social History   Substance Use Topics    Smoking status: Never Smoker    Smokeless tobacco: Never Used    Alcohol use No

## 2018-11-03 NOTE — PROGRESS NOTES
Pt calmed at this time with nurse at side, feeding pt. Ot stats stabilized at 97% RA. Will monitor.  Lori Rhodes RN

## 2018-11-03 NOTE — PROGRESS NOTES
Spoke to [facility] with [employer], Neuravi., consent obtained for surgery with Dr. Arpita Srivastava for Right hip introchanteric fracture reduction and fixation.  Grace Henry RN

## 2018-11-03 NOTE — PROGRESS NOTES
injection 10 mL PRN   magnesium hydroxide (MILK OF MAGNESIA) 400 MG/5ML suspension 30 mL Daily PRN   ondansetron (ZOFRAN) injection 4 mg Q6H PRN       Objective:  /66   Pulse 82   Temp 98 °F (36.7 °C) (Temporal)   Resp 18   Ht 6' 3\" (1.905 m)   Wt 114 lb 9 oz (52 kg)   SpO2 96%   BMI 14.32 kg/m²     Intake/Output Summary (Last 24 hours) at 11/03/18 1330  Last data filed at 11/03/18 0554   Gross per 24 hour   Intake          1162.15 ml   Output              700 ml   Net           462.15 ml    Wt Readings from Last 3 Encounters:   11/02/18 114 lb 9 oz (52 kg)   10/30/18 150 lb (68 kg)   06/18/16 151 lb 3.8 oz (68.6 kg)       General appearance:  Appears comfortable  Eyes: Sclera clear. Pupils equal.  ENT: Moist oral mucosa. Trachea midline, no adenopathy. Cardiovascular: Regular rhythm, normal S1, S2. No murmur. No edema in lower extremities  Respiratory: Not using accessory muscles. Good inspiratory effort. Clear to auscultation bilaterally, no wheeze or crackles. GI: Abdomen soft, no tenderness, not distended, normal bowel sounds  Musculoskeletal: No cyanosis in digits, neck supple  Neurology: CN 2-12 grossly intact. No speech or motor deficits  Psych: Normal affect. Alert and oriented in time, place and person  Skin: Warm, dry, normal turgor  Extremity exam shows brisk capillary refill. Peripheral pulses are palpable in lower extremities     Labs and Tests:  CBC:   Recent Labs      11/02/18   1823   WBC  14.5*   HGB  11.7*   PLT  122*     BMP:  Recent Labs      11/02/18   1823  11/03/18   0857   NA  143  148*   K  4.0  4.1   CL  108  109   CO2  31  32   BUN  49*  38*   CREATININE  1.0  0.8*   GLUCOSE  144*  107*     Hepatic: Recent Labs      11/03/18   0857   AST  30   ALT  16   BILITOT  0.4   ALKPHOS  50     XR CHEST PORTABLE   Final Result   No radiographic evidence of acute cardiopulmonary disease.          XR HIP 2-3 VW W PELVIS RIGHT   Final Result   Comminuted, displaced intertrochanteric

## 2018-11-03 NOTE — ED PROVIDER NOTES
as words and phrases that may be inappropriate. If there are any questions or concerns please feel free to contact the dictating provider for clarification.       Rosalie Ortiz, DO  11/02/18 2132       Rosalie Ortiz, DO  11/02/18 2134
Plain radiographic images are visualized andpreliminarily interpreted by the  ED Provider with the below findings:        Interpretation perthe Radiologist below, if available at the time of this note:    XR CHEST PORTABLE   Final Result   No radiographic evidence of acute cardiopulmonary disease. XR HIP 2-3 VW W PELVIS RIGHT   Final Result   Comminuted, displaced intertrochanteric fracture of the right femur, as   described in body of report. No results found.       PROCEDURES   Unless otherwise noted below, none     Procedures    CRITICAL CARE TIME   N/A    CONSULTS:  IP CONSULT TO ORTHOPEDIC SURGERY  IP CONSULT TO HOSPITALIST  IP CONSULT TO DIETITIAN  IP CONSULT TO SOCIAL WORK      EMERGENCY DEPARTMENT COURSE and DIFFERENTIALDIAGNOSIS/MDM:   Vitals:    Vitals:    11/02/18 2100 11/02/18 2145 11/02/18 2156 11/02/18 2345   BP: 139/68  132/79 112/61   Pulse: 98  91 92   Resp: 14  14 14   Temp:   98.3 °F (36.8 °C) 99.2 °F (37.3 °C)   TempSrc:   Axillary Axillary   SpO2: 99%  97% 97%   Weight:  114 lb 9 oz (52 kg)     Height:           Patient was given thefollowing medications:  Medications   ceFAZolin (ANCEF) 2 g in dextrose 4 % 100 mL IVPB (premix) (not administered)   acetaminophen (TYLENOL) tablet 500 mg (500 mg Oral Given 11/2/18 2350)   ARIPiprazole (ABILIFY) tablet 10 mg (not administered)   carBAMazepine (TEGRETOL) tablet 200 mg (200 mg Oral Given 11/2/18 2350)   clonazePAM (KLONOPIN) tablet 0.5 mg (0.5 mg Oral Given 11/2/18 2350)   divalproex (DEPAKOTE SPRINKLE) capsule 500 mg (not administered)   lactulose (CHRONULAC) 10 GM/15ML solution 20 g (20 g Oral Given 11/2/18 2352)   levETIRAcetam (KEPPRA) 100 MG/ML solution 500 mg (500 mg Oral Given 11/2/18 2351)   levothyroxine (SYNTHROID) tablet 112 mcg (not administered)   tamsulosin (FLOMAX) capsule 0.4 mg (not administered)   sodium chloride flush 0.9 % injection 10 mL (10 mLs Intravenous Given 11/2/18 2353)   sodium chloride flush 0.9 %

## 2018-11-03 NOTE — CONSULTS
0.5 mg, Intravenous, Q5 Min PRN  fentaNYL (SUBLIMAZE) injection 25 mcg, 25 mcg, Intravenous, Q5 Min PRN  fentaNYL (SUBLIMAZE) injection 50 mcg, 50 mcg, Intravenous, Q5 Min PRN  HYDROcodone-acetaminophen (NORCO) 5-325 MG per tablet 1 tablet, 1 tablet, Oral, PRN **OR** HYDROcodone-acetaminophen (NORCO) 5-325 MG per tablet 2 tablet, 2 tablet, Oral, PRN  diphenhydrAMINE (BENADRYL) injection 12.5 mg, 12.5 mg, Intravenous, Once PRN  promethazine (PHENERGAN) injection 6.25 mg, 6.25 mg, Intravenous, Q30 Min PRN  meperidine (DEMEROL) injection 12.5 mg, 12.5 mg, Intravenous, Q5 Min PRN  acetaminophen (TYLENOL) tablet 500 mg, 500 mg, Oral, Q6H PRN  ARIPiprazole (ABILIFY) tablet 10 mg, 10 mg, Oral, Daily  carBAMazepine (TEGRETOL) tablet 200 mg, 200 mg, Oral, BID  clonazePAM (KLONOPIN) tablet 0.5 mg, 0.5 mg, Oral, BID  divalproex (DEPAKOTE SPRINKLE) capsule 500 mg, 500 mg, Oral, Daily  lactulose (CHRONULAC) 10 GM/15ML solution 20 g, 20 g, Oral, BID  levETIRAcetam (KEPPRA) 100 MG/ML solution 500 mg, 500 mg, Oral, BID  levothyroxine (SYNTHROID) tablet 112 mcg, 112 mcg, Oral, Daily  tamsulosin (FLOMAX) capsule 0.4 mg, 0.4 mg, Oral, Daily  sodium chloride flush 0.9 % injection 10 mL, 10 mL, Intravenous, 2 times per day  sodium chloride flush 0.9 % injection 10 mL, 10 mL, Intravenous, PRN  magnesium hydroxide (MILK OF MAGNESIA) 400 MG/5ML suspension 30 mL, 30 mL, Oral, Daily PRN  ondansetron (ZOFRAN) injection 4 mg, 4 mg, Intravenous, Q6H PRN  Allergies:  No known allergies    Social History:   TOBACCO:   reports that he has never smoked. He has never used smokeless tobacco.  ETOH:   reports that he does not drink alcohol. DRUGS:   reports that he does not use drugs. Family History:   History reviewed. No pertinent family history.   REVIEW OF SYSTEMS:    Review of systems not obtained due to patient factors - mental status    PHYSICAL EXAM:    VITALS:  /63   Pulse 80   Temp 99.2 °F (37.3 °C) (Axillary)   Resp 16   Ht 6' 3\" Date    PROTIME 10.9 03/19/2014    INR 0.97 03/19/2014     Radiology Review:  EXAMINATION:   SINGLE XRAY VIEW OF THE PELVIS AND 2 XRAY VIEWS RIGHT HIP       11/2/2018 6:11 pm       COMPARISON:   None.       HISTORY:   ORDERING SYSTEM PROVIDED HISTORY: inj   TECHNOLOGIST PROVIDED HISTORY:   Reason for exam:->inj   Ordering Physician Provided Reason for Exam: Injury   Acuity: Acute   Type of Exam: Initial       FINDINGS:   There is a comminuted displaced intertrochanteric fracture of the right   femur.  There is posterior lateral displacement of the distal femoral major   fracture fragment by approximately 1 shaft with. [place] is overriding of the   major fracture fragments.  The femoral head appears well located in the   acetabulum.  No additional acute fracture is identified.       Diffuse stool burden throughout the partially imaged colon.           Impression   Comminuted, displaced intertrochanteric fracture of the right femur, as   described in body of report. EXAMINATION:   2 XRAY VIEWS OF THE RIGHT KNEE       10/30/2018 5:23 pm       COMPARISON:   None.       HISTORY:   ORDERING SYSTEM PROVIDED HISTORY: knee pain   TECHNOLOGIST PROVIDED HISTORY:   Reason for exam:->knee pain   Ordering Physician Provided Reason for Exam: Right knee pain   Acuity: Acute   Type of Exam: Initial       FINDINGS:   There is mild tricompartmental degenerative joint space narrowing with   minimal marginal osteophyte formation.  There is no fracture or malalignment. There is no joint effusion.  Vascular calcification is noted within the upper   calf.           Impression   No acute abnormality.      EXAMINATION:   SINGLE XRAY VIEW OF THE CHEST       11/2/2018 6:14 pm       COMPARISON:   Chest 05/30/2015       HISTORY:   ORDERING SYSTEM PROVIDED HISTORY: hip fx - preop   TECHNOLOGIST PROVIDED HISTORY:   Reason for exam:->hip fx - preop   Ordering Physician Provided Reason for Exam: hip fx - preop   Acuity: Acute   Type of Exam: Initial   Mechanism of Injury: Fall       FINDINGS:   The cardiomediastinal and hilar silhouettes appear unremarkable.  The lungs   appear clear. No pleural effusion evident. No pneumothorax is seen. No acute   osseous abnormality is identified.           Impression   No radiographic evidence of acute cardiopulmonary disease. IMPRESSION/RECOMMENDATIONS:    78-year-old male with severe protein calorie malnutrition and a BMI of 14.32, severe mental retardation and chronically on antiseizure medication with acute comminuted right intertrochanteric hip fracture. Based on his fracture pattern he will require operative intervention for reduction and fixation. We will plan on using an intramedullary nail. He is still at risk of nonunion and could possibly require revision to total joint arthroplasty in the future. He is still high risk for that type of procedure due to his inability to follow any postoperative therapy restrictions. Also high risk due to his nutrition status. I have asked for a nutrition consult. He may require consideration for supplemental tube feeds due to his malnutrition making his risk of perioperative complications and infection high.

## 2018-11-03 NOTE — OP NOTE
Operative Report  11/3/2018   Patient Name: Deonte Pierce : 1970     MRN: [id number]    Preoperative Diagnosis: Right hip intertrochanteric fracture    Postoperative Diagnosis: Right hip intertrochanteric fracture    Surgeon: Jessi Wilkinson M.D. Procedure:  Gamma nail for Right intertrochanteric hip fracture (CPT 57451)    Anesthesia: General    Estimated Blood Loss: 100 mL    Specimens: None    Implants:  [place] gamma 3 intramedullary nail 125 Degree ,    380 mm long X 10 mm,  105 mm lag screw and 5.0 mm distal locking screws ×2. Intra-operative complications: None apparent. Indications: 49-year-old severely mentally retarded male that lives at a group home. His noted to have swelling of the right thigh with external rotation of the leg. No known direct injury that has been documented. He is brought to the ER yesterday and x-rays revealed an acute and severely comminuted right hip intertrochanteric fracture. His medical guardian company has been contacted to obtain consent for operative intervention. Based on his fracture pattern that reason for fixation would be pain relief and improved mobility in the bed. I am not certain as to what baseline ambulatory status he possessed. He is severely malnourished. Description:   Patient was identified in the preoperative holding area and the correct site of the right hip was marked. I verified the patient's consent with the chart. The patient was then taken to the operating room and general anesthesia was administered while on the hospital bed. The patient was then transferred to the fracture table and secured into the limb spars with care to ensure appropriate padding of the feet. The patient was placed against a well-padded perineal post.  Care was taken to ensure all bony prominences were padded. Surgical time out was called verifying necessary data including administration of preoperative antibiotics.   C-arm fluoroscopy was then

## 2018-11-04 LAB
ANION GAP SERPL CALCULATED.3IONS-SCNC: 6 MMOL/L (ref 3–16)
BASOPHILS ABSOLUTE: 0 K/UL (ref 0–0.2)
BASOPHILS RELATIVE PERCENT: 0.5 %
BUN BLDV-MCNC: 25 MG/DL (ref 7–20)
CALCIUM SERPL-MCNC: 8.1 MG/DL (ref 8.3–10.6)
CHLORIDE BLD-SCNC: 104 MMOL/L (ref 99–110)
CO2: 35 MMOL/L (ref 21–32)
CREAT SERPL-MCNC: 0.7 MG/DL (ref 0.9–1.3)
EOSINOPHILS ABSOLUTE: 0.1 K/UL (ref 0–0.6)
EOSINOPHILS RELATIVE PERCENT: 1.1 %
GFR AFRICAN AMERICAN: >60
GFR NON-AFRICAN AMERICAN: >60
GLUCOSE BLD-MCNC: 144 MG/DL (ref 70–99)
HCT VFR BLD CALC: 25.8 % (ref 40.5–52.5)
HEMOGLOBIN: 8.6 G/DL (ref 13.5–17.5)
LYMPHOCYTES ABSOLUTE: 0.5 K/UL (ref 1–5.1)
LYMPHOCYTES RELATIVE PERCENT: 5.9 %
MCH RBC QN AUTO: 32.4 PG (ref 26–34)
MCHC RBC AUTO-ENTMCNC: 33.3 G/DL (ref 31–36)
MCV RBC AUTO: 97.3 FL (ref 80–100)
MONOCYTES ABSOLUTE: 0.7 K/UL (ref 0–1.3)
MONOCYTES RELATIVE PERCENT: 7.9 %
NEUTROPHILS ABSOLUTE: 7.7 K/UL (ref 1.7–7.7)
NEUTROPHILS RELATIVE PERCENT: 84.6 %
PDW BLD-RTO: 14 % (ref 12.4–15.4)
PLATELET # BLD: 108 K/UL (ref 135–450)
PMV BLD AUTO: 11.8 FL (ref 5–10.5)
POTASSIUM REFLEX MAGNESIUM: 4 MMOL/L (ref 3.5–5.1)
RBC # BLD: 2.65 M/UL (ref 4.2–5.9)
SODIUM BLD-SCNC: 145 MMOL/L (ref 136–145)
VITAMIN D 25-HYDROXY: 66.1 NG/ML
WBC # BLD: 9.1 K/UL (ref 4–11)

## 2018-11-04 PROCEDURE — 6370000000 HC RX 637 (ALT 250 FOR IP): Performed by: HOSPITALIST

## 2018-11-04 PROCEDURE — 82306 VITAMIN D 25 HYDROXY: CPT

## 2018-11-04 PROCEDURE — 2500000003 HC RX 250 WO HCPCS: Performed by: HOSPITALIST

## 2018-11-04 PROCEDURE — 6360000002 HC RX W HCPCS: Performed by: FAMILY MEDICINE

## 2018-11-04 PROCEDURE — 6370000000 HC RX 637 (ALT 250 FOR IP): Performed by: NURSE PRACTITIONER

## 2018-11-04 PROCEDURE — 6370000000 HC RX 637 (ALT 250 FOR IP): Performed by: FAMILY MEDICINE

## 2018-11-04 PROCEDURE — 1200000000 HC SEMI PRIVATE

## 2018-11-04 PROCEDURE — 36415 COLL VENOUS BLD VENIPUNCTURE: CPT

## 2018-11-04 PROCEDURE — 6360000002 HC RX W HCPCS: Performed by: NURSE PRACTITIONER

## 2018-11-04 PROCEDURE — 80048 BASIC METABOLIC PNL TOTAL CA: CPT

## 2018-11-04 PROCEDURE — 6370000000 HC RX 637 (ALT 250 FOR IP): Performed by: ORTHOPAEDIC SURGERY

## 2018-11-04 PROCEDURE — 2580000003 HC RX 258: Performed by: FAMILY MEDICINE

## 2018-11-04 PROCEDURE — 85025 COMPLETE CBC W/AUTO DIFF WBC: CPT

## 2018-11-04 RX ORDER — OYSTER SHELL CALCIUM WITH VITAMIN D 500; 200 MG/1; [IU]/1
1 TABLET, FILM COATED ORAL 2 TIMES DAILY
Status: DISCONTINUED | OUTPATIENT
Start: 2018-11-04 | End: 2018-11-08 | Stop reason: HOSPADM

## 2018-11-04 RX ORDER — HALOPERIDOL 5 MG/ML
2 INJECTION INTRAMUSCULAR EVERY 4 HOURS PRN
Status: DISCONTINUED | OUTPATIENT
Start: 2018-11-04 | End: 2018-11-08 | Stop reason: HOSPADM

## 2018-11-04 RX ORDER — QUETIAPINE FUMARATE 25 MG/1
25 TABLET, FILM COATED ORAL ONCE
Status: COMPLETED | OUTPATIENT
Start: 2018-11-04 | End: 2018-11-04

## 2018-11-04 RX ORDER — ACETAMINOPHEN 500 MG
1000 TABLET ORAL EVERY 8 HOURS SCHEDULED
Status: DISCONTINUED | OUTPATIENT
Start: 2018-11-04 | End: 2018-11-08 | Stop reason: HOSPADM

## 2018-11-04 RX ORDER — OXYCODONE HYDROCHLORIDE 5 MG/1
5 TABLET ORAL EVERY 4 HOURS PRN
Status: DISCONTINUED | OUTPATIENT
Start: 2018-11-04 | End: 2018-11-08 | Stop reason: HOSPADM

## 2018-11-04 RX ORDER — OXYCODONE HYDROCHLORIDE 5 MG/1
10 TABLET ORAL EVERY 4 HOURS PRN
Status: DISCONTINUED | OUTPATIENT
Start: 2018-11-04 | End: 2018-11-08 | Stop reason: HOSPADM

## 2018-11-04 RX ADMIN — HYDROMORPHONE HYDROCHLORIDE 0.5 MG: 1 INJECTION, SOLUTION INTRAMUSCULAR; INTRAVENOUS; SUBCUTANEOUS at 06:15

## 2018-11-04 RX ADMIN — ONDANSETRON HYDROCHLORIDE 4 MG: 2 INJECTION, SOLUTION INTRAMUSCULAR; INTRAVENOUS at 01:20

## 2018-11-04 RX ADMIN — ASPIRIN 325 MG: 325 TABLET, DELAYED RELEASE ORAL at 08:56

## 2018-11-04 RX ADMIN — QUETIAPINE FUMARATE 25 MG: 25 TABLET ORAL at 22:14

## 2018-11-04 RX ADMIN — CLONAZEPAM 0.5 MG: 1 TABLET ORAL at 08:57

## 2018-11-04 RX ADMIN — ARIPIPRAZOLE 10 MG: 5 TABLET ORAL at 08:56

## 2018-11-04 RX ADMIN — FAMOTIDINE 20 MG: 20 TABLET ORAL at 20:03

## 2018-11-04 RX ADMIN — DIVALPROEX SODIUM 500 MG: 125 CAPSULE, COATED PELLETS ORAL at 08:56

## 2018-11-04 RX ADMIN — TAMSULOSIN HYDROCHLORIDE 0.4 MG: 0.4 CAPSULE ORAL at 08:56

## 2018-11-04 RX ADMIN — LORAZEPAM 0.5 MG: 2 INJECTION INTRAMUSCULAR; INTRAVENOUS at 09:45

## 2018-11-04 RX ADMIN — LEVOTHYROXINE SODIUM 112 MCG: 112 TABLET ORAL at 05:55

## 2018-11-04 RX ADMIN — HYDROMORPHONE HYDROCHLORIDE 0.5 MG: 1 INJECTION, SOLUTION INTRAMUSCULAR; INTRAVENOUS; SUBCUTANEOUS at 21:31

## 2018-11-04 RX ADMIN — LEVETIRACETAM 500 MG: 100 SOLUTION ORAL at 20:02

## 2018-11-04 RX ADMIN — DOCUSATE SODIUM 100 MG: 100 CAPSULE, LIQUID FILLED ORAL at 20:02

## 2018-11-04 RX ADMIN — DOCUSATE SODIUM 100 MG: 100 CAPSULE, LIQUID FILLED ORAL at 08:56

## 2018-11-04 RX ADMIN — CALCIUM CARBONATE-VITAMIN D TAB 500 MG-200 UNIT 1 TABLET: 500-200 TAB at 20:02

## 2018-11-04 RX ADMIN — LACTULOSE 20 G: 20 SOLUTION ORAL at 08:55

## 2018-11-04 RX ADMIN — CARBAMAZEPINE 200 MG: 200 TABLET ORAL at 08:56

## 2018-11-04 RX ADMIN — POTASSIUM CHLORIDE, DEXTROSE MONOHYDRATE AND SODIUM CHLORIDE: 150; 5; 900 INJECTION, SOLUTION INTRAVENOUS at 05:54

## 2018-11-04 RX ADMIN — ASPIRIN 325 MG: 325 TABLET, DELAYED RELEASE ORAL at 20:02

## 2018-11-04 RX ADMIN — LORAZEPAM 0.5 MG: 2 INJECTION INTRAMUSCULAR; INTRAVENOUS at 03:28

## 2018-11-04 RX ADMIN — THERA TABS 1 TABLET: TAB at 08:56

## 2018-11-04 RX ADMIN — FAMOTIDINE 20 MG: 20 TABLET ORAL at 08:56

## 2018-11-04 RX ADMIN — ACETAMINOPHEN 1000 MG: 500 TABLET, FILM COATED ORAL at 22:14

## 2018-11-04 RX ADMIN — LORAZEPAM 0.5 MG: 2 INJECTION INTRAMUSCULAR; INTRAVENOUS at 18:36

## 2018-11-04 RX ADMIN — CALCIUM CARBONATE-VITAMIN D TAB 500 MG-200 UNIT 1 TABLET: 500-200 TAB at 11:18

## 2018-11-04 RX ADMIN — CARBAMAZEPINE 200 MG: 200 TABLET ORAL at 20:02

## 2018-11-04 RX ADMIN — CLONAZEPAM 0.5 MG: 1 TABLET ORAL at 20:02

## 2018-11-04 RX ADMIN — Medication 10 ML: at 20:02

## 2018-11-04 RX ADMIN — LEVETIRACETAM 500 MG: 100 SOLUTION ORAL at 08:56

## 2018-11-04 RX ADMIN — LACTULOSE 20 G: 20 SOLUTION ORAL at 20:02

## 2018-11-04 RX ADMIN — HYDROMORPHONE HYDROCHLORIDE 0.5 MG: 1 INJECTION, SOLUTION INTRAMUSCULAR; INTRAVENOUS; SUBCUTANEOUS at 00:13

## 2018-11-04 ASSESSMENT — PAIN DESCRIPTION - PAIN TYPE: TYPE: SURGICAL PAIN

## 2018-11-04 ASSESSMENT — PAIN SCALES - GENERAL
PAINLEVEL_OUTOF10: 5
PAINLEVEL_OUTOF10: 7

## 2018-11-04 ASSESSMENT — PAIN SCALES - WONG BAKER: WONGBAKER_NUMERICALRESPONSE: 8

## 2018-11-04 ASSESSMENT — PAIN DESCRIPTION - ORIENTATION: ORIENTATION: RIGHT

## 2018-11-04 ASSESSMENT — PAIN DESCRIPTION - LOCATION: LOCATION: HIP

## 2018-11-04 NOTE — PROGRESS NOTES
Department of Orthopedic Surgery  Attending Progress Note        Subjective:  Unable to voice complaints due to non-verbal with severe MR with estimated IQ 20-30    Vitals  VITALS:  /70   Pulse 88   Temp 98.8 °F (37.1 °C) (Axillary)   Resp 16   Ht 6' 3\" (1.905 m)   Wt 114 lb 9 oz (52 kg)   SpO2 99%   BMI 14.32 kg/m²    24HR INTAKE/OUTPUT:    Intake/Output Summary (Last 24 hours) at 11/04/18 1114  Last data filed at 11/04/18 0940   Gross per 24 hour   Intake             2505 ml   Output             1800 ml   Net              705 ml       PHYSICAL EXAM:    Orientation:  Awake and tracks me with his eyes, socks cover his hands to keep him from pulling items. Atrophy and severe fat loss reflect years of protein calorie malnutrition. Right Lower Extremity    Incision:  dressing in place, clean, dry and intact    Motor and sensory function difficult to assess due to his inability to follow commands. There is some spontaneous movement of his right foot to palpation. Pulses:    Posterior Tibial:  2    Abnormal Exam findings:  Right thigh swelling and bruising are improved. LABS:    HgB:    Lab Results   Component Value Date    HGB 8.6 11/04/2018     ASSESSMENT AND PLAN:    Post operative day 1 status post right hip IM nail for comminuted intertrochanteric hip fracture    1:  Non weight bearing  2:  Deep venous thrombosis prophylaxis - asa bid for now, platelets 376 and has hx of thrombocytopenia. 3:  D/C Plan:  [place]  4:  Pain Control:  Continue current regimen and assess his non-verbal cues for pain. 5:  Acute blood loss anemia and he is also showing + fluid balance likely due to chronic dehydration and low albumin allowing fluid to remain in the extravascular space. 6: FEN: I agree that PEG tube placement may be in his best interest at least in the short term for fracture healing, but based on his history he has been undernourished for years.

## 2018-11-04 NOTE — PLAN OF CARE
Problem: Falls - Risk of:  Goal: Absence of physical injury  Absence of physical injury       Outcome: Ongoing  Absence of physical injury noted    Problem: Pain:  Goal: Control of acute pain  Control of acute pain   Outcome: Ongoing  Acute pain controlled with ordered medications    Problem: Injury - Risk of, Physical Injury:  Intervention: Manage bed safety  Bed safety managed  Intervention: Fall prevention measures  Fall prevention measures taken    Goal: Absence of physical injury  Absence of physical injury       Outcome: Ongoing  Absence of physical injury noted    Problem: Mood - Altered:  Goal: Mood stable  Mood stable      Outcome: Ongoing  Ongoing monitoring for stable mood

## 2018-11-04 NOTE — PROGRESS NOTES
AM assessment complete. Neuro checks WDL. VSS. Hip dressing clean dry and intact. Patient has some blanchable redness on his coccyx. mepilex applied to coccyx. Patient has some areas of abrasions in different stages of healing. Patient resting in bed with call light in reach. Patient has been crying out.

## 2018-11-04 NOTE — PROGRESS NOTES
Specialty mattress order placed to help prevent skin breakdown r/t bedrest & hubert score.  Zulema Pendleton RN

## 2018-11-05 LAB
ALBUMIN SERPL-MCNC: 2.5 G/DL (ref 3.4–5)
ANION GAP SERPL CALCULATED.3IONS-SCNC: 10 MMOL/L (ref 3–16)
BASOPHILS ABSOLUTE: 0.1 K/UL (ref 0–0.2)
BASOPHILS RELATIVE PERCENT: 0.8 %
BUN BLDV-MCNC: 24 MG/DL (ref 7–20)
CALCIUM SERPL-MCNC: 8.4 MG/DL (ref 8.3–10.6)
CHLORIDE BLD-SCNC: 103 MMOL/L (ref 99–110)
CO2: 31 MMOL/L (ref 21–32)
CREAT SERPL-MCNC: 0.6 MG/DL (ref 0.9–1.3)
EOSINOPHILS ABSOLUTE: 0.2 K/UL (ref 0–0.6)
EOSINOPHILS RELATIVE PERCENT: 2.7 %
GFR AFRICAN AMERICAN: >60
GFR NON-AFRICAN AMERICAN: >60
GLUCOSE BLD-MCNC: 89 MG/DL (ref 70–99)
HCT VFR BLD CALC: 29.5 % (ref 40.5–52.5)
HEMOGLOBIN: 9.8 G/DL (ref 13.5–17.5)
LYMPHOCYTES ABSOLUTE: 0.9 K/UL (ref 1–5.1)
LYMPHOCYTES RELATIVE PERCENT: 11.9 %
MCH RBC QN AUTO: 32.9 PG (ref 26–34)
MCHC RBC AUTO-ENTMCNC: 33.2 G/DL (ref 31–36)
MCV RBC AUTO: 98.9 FL (ref 80–100)
MONOCYTES ABSOLUTE: 0.6 K/UL (ref 0–1.3)
MONOCYTES RELATIVE PERCENT: 8.8 %
NEUTROPHILS ABSOLUTE: 5.5 K/UL (ref 1.7–7.7)
NEUTROPHILS RELATIVE PERCENT: 75.8 %
PDW BLD-RTO: 14 % (ref 12.4–15.4)
PLATELET # BLD: 109 K/UL (ref 135–450)
PMV BLD AUTO: 12 FL (ref 5–10.5)
POTASSIUM REFLEX MAGNESIUM: 3.8 MMOL/L (ref 3.5–5.1)
RBC # BLD: 2.98 M/UL (ref 4.2–5.9)
SODIUM BLD-SCNC: 144 MMOL/L (ref 136–145)
WBC # BLD: 7.3 K/UL (ref 4–11)

## 2018-11-05 PROCEDURE — 36415 COLL VENOUS BLD VENIPUNCTURE: CPT

## 2018-11-05 PROCEDURE — 92610 EVALUATE SWALLOWING FUNCTION: CPT

## 2018-11-05 PROCEDURE — G8997 SWALLOW GOAL STATUS: HCPCS

## 2018-11-05 PROCEDURE — 2580000003 HC RX 258: Performed by: FAMILY MEDICINE

## 2018-11-05 PROCEDURE — 6370000000 HC RX 637 (ALT 250 FOR IP): Performed by: NURSE PRACTITIONER

## 2018-11-05 PROCEDURE — 6370000000 HC RX 637 (ALT 250 FOR IP): Performed by: FAMILY MEDICINE

## 2018-11-05 PROCEDURE — G8996 SWALLOW CURRENT STATUS: HCPCS

## 2018-11-05 PROCEDURE — 92526 ORAL FUNCTION THERAPY: CPT

## 2018-11-05 PROCEDURE — 6370000000 HC RX 637 (ALT 250 FOR IP): Performed by: HOSPITALIST

## 2018-11-05 PROCEDURE — 1200000000 HC SEMI PRIVATE

## 2018-11-05 PROCEDURE — 80048 BASIC METABOLIC PNL TOTAL CA: CPT

## 2018-11-05 PROCEDURE — 85025 COMPLETE CBC W/AUTO DIFF WBC: CPT

## 2018-11-05 PROCEDURE — 6360000002 HC RX W HCPCS: Performed by: NURSE PRACTITIONER

## 2018-11-05 PROCEDURE — 6370000000 HC RX 637 (ALT 250 FOR IP): Performed by: ORTHOPAEDIC SURGERY

## 2018-11-05 PROCEDURE — 82040 ASSAY OF SERUM ALBUMIN: CPT

## 2018-11-05 PROCEDURE — 99024 POSTOP FOLLOW-UP VISIT: CPT | Performed by: NURSE PRACTITIONER

## 2018-11-05 RX ORDER — MIRTAZAPINE 15 MG/1
30 TABLET, FILM COATED ORAL NIGHTLY
Status: DISCONTINUED | OUTPATIENT
Start: 2018-11-05 | End: 2018-11-08 | Stop reason: HOSPADM

## 2018-11-05 RX ORDER — OXYCODONE HYDROCHLORIDE 5 MG/1
5-10 TABLET ORAL EVERY 4 HOURS PRN
Qty: 10 TABLET | Refills: 0 | Status: SHIPPED | OUTPATIENT
Start: 2018-11-05 | End: 2018-11-19

## 2018-11-05 RX ADMIN — FAMOTIDINE 20 MG: 20 TABLET ORAL at 08:33

## 2018-11-05 RX ADMIN — ASPIRIN 325 MG: 325 TABLET, DELAYED RELEASE ORAL at 22:30

## 2018-11-05 RX ADMIN — HYDROMORPHONE HYDROCHLORIDE 0.5 MG: 1 INJECTION, SOLUTION INTRAMUSCULAR; INTRAVENOUS; SUBCUTANEOUS at 06:02

## 2018-11-05 RX ADMIN — HALOPERIDOL LACTATE 2 MG: 5 INJECTION, SOLUTION INTRAMUSCULAR at 02:19

## 2018-11-05 RX ADMIN — ACETAMINOPHEN 1000 MG: 500 TABLET, FILM COATED ORAL at 22:29

## 2018-11-05 RX ADMIN — LACTULOSE 20 G: 20 SOLUTION ORAL at 08:33

## 2018-11-05 RX ADMIN — OXYCODONE HYDROCHLORIDE 5 MG: 5 TABLET ORAL at 02:00

## 2018-11-05 RX ADMIN — LACTULOSE 20 G: 20 SOLUTION ORAL at 22:30

## 2018-11-05 RX ADMIN — CALCIUM CARBONATE-VITAMIN D TAB 500 MG-200 UNIT 1 TABLET: 500-200 TAB at 08:33

## 2018-11-05 RX ADMIN — CALCIUM CARBONATE-VITAMIN D TAB 500 MG-200 UNIT 1 TABLET: 500-200 TAB at 22:28

## 2018-11-05 RX ADMIN — LEVETIRACETAM 500 MG: 100 SOLUTION ORAL at 22:30

## 2018-11-05 RX ADMIN — ACETAMINOPHEN 1000 MG: 500 TABLET, FILM COATED ORAL at 14:15

## 2018-11-05 RX ADMIN — CARBAMAZEPINE 200 MG: 200 TABLET ORAL at 08:33

## 2018-11-05 RX ADMIN — TAMSULOSIN HYDROCHLORIDE 0.4 MG: 0.4 CAPSULE ORAL at 08:33

## 2018-11-05 RX ADMIN — THERA TABS 1 TABLET: TAB at 08:33

## 2018-11-05 RX ADMIN — ARIPIPRAZOLE 10 MG: 5 TABLET ORAL at 08:33

## 2018-11-05 RX ADMIN — FAMOTIDINE 20 MG: 20 TABLET ORAL at 22:28

## 2018-11-05 RX ADMIN — DIVALPROEX SODIUM 500 MG: 125 CAPSULE, COATED PELLETS ORAL at 08:33

## 2018-11-05 RX ADMIN — Medication 10 ML: at 22:31

## 2018-11-05 RX ADMIN — HALOPERIDOL LACTATE 2 MG: 5 INJECTION, SOLUTION INTRAMUSCULAR at 11:04

## 2018-11-05 RX ADMIN — ASPIRIN 325 MG: 325 TABLET, DELAYED RELEASE ORAL at 08:33

## 2018-11-05 RX ADMIN — LEVETIRACETAM 500 MG: 100 SOLUTION ORAL at 08:33

## 2018-11-05 RX ADMIN — DOCUSATE SODIUM 100 MG: 100 CAPSULE, LIQUID FILLED ORAL at 08:33

## 2018-11-05 RX ADMIN — LEVOTHYROXINE SODIUM 112 MCG: 112 TABLET ORAL at 06:02

## 2018-11-05 RX ADMIN — MIRTAZAPINE 30 MG: 15 TABLET, FILM COATED ORAL at 22:28

## 2018-11-05 RX ADMIN — HYDROMORPHONE HYDROCHLORIDE 0.5 MG: 1 INJECTION, SOLUTION INTRAMUSCULAR; INTRAVENOUS; SUBCUTANEOUS at 14:11

## 2018-11-05 RX ADMIN — ACETAMINOPHEN 1000 MG: 500 TABLET, FILM COATED ORAL at 06:10

## 2018-11-05 RX ADMIN — DOCUSATE SODIUM 100 MG: 100 CAPSULE, LIQUID FILLED ORAL at 22:29

## 2018-11-05 RX ADMIN — CARBAMAZEPINE 200 MG: 200 TABLET ORAL at 22:29

## 2018-11-05 RX ADMIN — CLONAZEPAM 0.5 MG: 1 TABLET ORAL at 22:30

## 2018-11-05 RX ADMIN — HALOPERIDOL LACTATE 2 MG: 5 INJECTION, SOLUTION INTRAMUSCULAR at 17:10

## 2018-11-05 RX ADMIN — OXYCODONE HYDROCHLORIDE 10 MG: 5 TABLET ORAL at 08:33

## 2018-11-05 RX ADMIN — CLONAZEPAM 0.5 MG: 1 TABLET ORAL at 08:33

## 2018-11-05 ASSESSMENT — PAIN DESCRIPTION - LOCATION: LOCATION: HIP

## 2018-11-05 ASSESSMENT — PAIN SCALES - GENERAL
PAINLEVEL_OUTOF10: 7
PAINLEVEL_OUTOF10: 3
PAINLEVEL_OUTOF10: 7
PAINLEVEL_OUTOF10: 5
PAINLEVEL_OUTOF10: 2
PAINLEVEL_OUTOF10: 7
PAINLEVEL_OUTOF10: 5
PAINLEVEL_OUTOF10: 7

## 2018-11-05 ASSESSMENT — PAIN DESCRIPTION - PAIN TYPE: TYPE: SURGICAL PAIN

## 2018-11-05 ASSESSMENT — PAIN SCALES - WONG BAKER
WONGBAKER_NUMERICALRESPONSE: 8

## 2018-11-05 ASSESSMENT — PAIN DESCRIPTION - ORIENTATION: ORIENTATION: RIGHT

## 2018-11-05 NOTE — PLAN OF CARE
Problem: Falls - Risk of:  Goal: Will remain free from falls  Will remain free from falls       Outcome: Ongoing  Fall precautions in place, video monitor in use, call light in reach. Problem: Pain:  Goal: Pain level will decrease  Pain level will decrease   Outcome: Ongoing    Goal: Control of acute pain  Control of acute pain   Outcome: Ongoing  Prn pain meds for pain control. Problem: Confusion - Acute:  Goal: Mental status will be restored to baseline  Mental status will be restored to baseline      Outcome: Ongoing  Pt's mental status is baseline, MRDD, yells out frequently. Problem: Injury - Risk of, Physical Injury:  Goal: Will remain free from falls  Will remain free from falls       Outcome: Ongoing  Fall precautions in place, video monitor in use, call light in reach.

## 2018-11-05 NOTE — PROGRESS NOTES
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 Objective  Physical exam:  Vitals: /76   Pulse 87   Temp 99 °F (37.2 °C) (Axillary)   Resp 16   Ht 6' 3\" (1.905 m)   Wt 114 lb 9 oz (52 kg)   SpO2 98%   BMI 14.32 kg/m²   Gen: Not in distress. Alert. Head: Normocephalic. Atraumatic. Eyes: EOMI. Good acuity. ENT: Oral mucosa moist, round, soft mass under chin that is not-tender, no warmth  Neck: No JVD. No obvious thyromegaly. CVS: Nml S1S2, no MRG, RRR  Pulmomary: Clear bilaterally. No crackles. No wheezes. Gastrointestinal: Soft, NT/ND. Positive bowel sounds. Musculoskeletal: No edema. Warm  Neuro: No focal deficit. Moves extremity spontaneously. Psychiatry: Appropriate affect. Not agitated. Skin: Warm, dry with normal turgor. No rash      24HR INTAKE/OUTPUT:    Intake/Output Summary (Last 24 hours) at 11/05/18 0837  Last data filed at 11/04/18 0940   Gross per 24 hour   Intake                0 ml   Output              450 ml   Net             -450 ml     I/O last 3 completed shifts:  In: -   Out: 450 [Urine:450]  No intake/output data recorded.       Meds:    calcium-vitamin D  1 tablet Oral BID    acetaminophen  1,000 mg Oral 3 times per day    multivitamin  1 tablet Oral Daily    famotidine  20 mg Oral BID    docusate sodium  100 mg Oral BID    aspirin  325 mg Oral BID    influenza virus vaccine  0.5 mL Intramuscular Once    ARIPiprazole  10 mg Oral Daily    carBAMazepine  200 mg Oral BID    clonazePAM  0.5 mg Oral BID    divalproex  500 mg Oral Daily    lactulose  20 g Oral BID    levETIRAcetam  500 mg Oral BID    levothyroxine  112 mcg Oral Daily    tamsulosin  0.4

## 2018-11-05 NOTE — DISCHARGE INSTR - COC
(last 30 days)    Impairments/Disabilities:      Speech and Contractures -      Nutrition Therapy:  Current Nutrition Therapy:   - Oral Diet:  General    Routes of Feeding: Oral  Liquids: Nectar Thick Liquids  Daily Fluid Restriction: no  Last Modified Barium Swallow with Video (Video Swallowing Test): not done    Treatments at the Time of Hospital Discharge:   Respiratory Treatments:   Oxygen Therapy:  is not on home oxygen therapy. Ventilator:    - No ventilator support    Rehab Therapies: Physical Therapy and Occupational Therapy  Weight Bearing Status/Restrictions: NON weight bearing on RIGHT leg  Other Medical Equipment (for information only, NOT a DME order):  Lift  Other Treatments: . Wound Care:    Change dressing daily and apply dry dressing and tegaderm. Can leave wound to air on postop day #5 if no drainage. Keep incision clean at all times. DVT Prevention:  mg. BID x 30 days    Follow up with: Dr. Radha Gross  in 2 weeks. Call [phone] for appointment. Patient's personal belongings (please select all that are sent with patient):  None    RN SIGNATURE:  Electronically signed by Marianna Cardona RN on 11/8/18 at 10:58 AM    CASE MANAGEMENT/SOCIAL WORK SECTION    Inpatient Status Date: 11.2.18    Readmission Risk Assessment Score:  Readmission Risk              Risk of Unplanned Readmission:        13           Discharging to Facility/ Agency   · Name: Clementine Williamson  · Phone: [phone]  · Fax: [phone]      / signature: Electronically signed by VAN Ash on 11/8/18 at 10:50 AM    PHYSICIAN SECTION    Prognosis: Fair    Condition at Discharge: Stable    Rehab Potential (if transferring to Rehab):  Fair    Recommended Labs or Other Treatments After Discharge: PT/OT, SPeech therapy    Physician Certification: I certify the above information and transfer of David Petties  is necessary for the continuing treatment of the diagnosis listed and

## 2018-11-05 NOTE — CARE COORDINATION
Discharge planning:   Noted consult for discharge planning. Awaiting therapy evals and recommendations. Calls out and messages left for patient's APSI Guardian, and staff from [facility] [id number]. Awaiting responses. Patient will continue to be followed through discharge. Addendum:  SW spoke with patient's APSI guardianVirgie  at [phone]. Informed her of anticipated need for ecf/snf, as patient would be non-weight bearing for several week. Patient resides in an apartment with a roommate. He has 1:1 24-hour staff available to him through [facility] [id number]. Ms. Dani Reese will contact her supervisor regarding patient's status and follow-up care needs. Await return call from Ms. Dani Reese. Patient will continue to be followed through discharge. ~Miriam A.  179-00 Mal Baker      /Discharge Planner     [phone]

## 2018-11-05 NOTE — PROGRESS NOTES
ST.) Pt will tolerate recommended diet without s/s of aspiration   2.) If clinical symptoms of penetration/aspiration continue to be noted,Pt will tolerate MBS to r/o aspiration and determine appropriate diet/liquid level. 3.) Pt will tolerate diet advance to least restricted diet, as clinically indicated, with no signs of aspiration   4.) Pt will improve oral motor function via bolus control exercises 5/5    Oral motor Exam:  Dentition: edentulous  Labial/Facial: Mild reduced strength and ROM  Lingual:mod reduced strength and ROM  Voice:Nonverbal.    Oral Phase:   Reduced A-P propulsion  Apparent premature bolus loss to pharynx    Pharyngeal Phase:  Apparent pharyngeal pooling   Delayed swallow initiation; intermittent severely delayed and absent swallow initiation   Severely reduced laryngeal elevation via palpation   Apparent decreased pharyngeal clearing    Patient/Family Education:Education, results and recommendations given to the Pt and nurse, who verbalized understanding    SLP G-Codes  Functional Limitations: Swallowing  Swallow Current Status (): At least 40 percent but less than 60 percent impaired, limited or restricted  Swallow Goal Status (): At least 20 percent but less than 40 percent impaired, limited or restricted     Timed Code Treatment: 0 minutes     Total Treatment Time: 30 minutes    Discharge Recommendations: Speech Therapy for Speech/Dysphagia treatment at discharge.     Iva Go, [address] [facility]  Speech Language Pathologist

## 2018-11-06 LAB
ALBUMIN SERPL-MCNC: 2 G/DL (ref 3.4–5)
ANION GAP SERPL CALCULATED.3IONS-SCNC: 8 MMOL/L (ref 3–16)
BASOPHILS ABSOLUTE: 0.1 K/UL (ref 0–0.2)
BASOPHILS RELATIVE PERCENT: 1.2 %
BUN BLDV-MCNC: 23 MG/DL (ref 7–20)
CALCIUM SERPL-MCNC: 8.4 MG/DL (ref 8.3–10.6)
CHLORIDE BLD-SCNC: 102 MMOL/L (ref 99–110)
CO2: 32 MMOL/L (ref 21–32)
CREAT SERPL-MCNC: 0.6 MG/DL (ref 0.9–1.3)
EOSINOPHILS ABSOLUTE: 0.3 K/UL (ref 0–0.6)
EOSINOPHILS RELATIVE PERCENT: 3.5 %
GFR AFRICAN AMERICAN: >60
GFR NON-AFRICAN AMERICAN: >60
GLUCOSE BLD-MCNC: 98 MG/DL (ref 70–99)
HCT VFR BLD CALC: 28.1 % (ref 40.5–52.5)
HEMOGLOBIN: 9.2 G/DL (ref 13.5–17.5)
LYMPHOCYTES ABSOLUTE: 0.8 K/UL (ref 1–5.1)
LYMPHOCYTES RELATIVE PERCENT: 10.5 %
MCH RBC QN AUTO: 32.3 PG (ref 26–34)
MCHC RBC AUTO-ENTMCNC: 32.7 G/DL (ref 31–36)
MCV RBC AUTO: 98.8 FL (ref 80–100)
MONOCYTES ABSOLUTE: 0.5 K/UL (ref 0–1.3)
MONOCYTES RELATIVE PERCENT: 6.6 %
NEUTROPHILS ABSOLUTE: 5.7 K/UL (ref 1.7–7.7)
NEUTROPHILS RELATIVE PERCENT: 78.2 %
PDW BLD-RTO: 14 % (ref 12.4–15.4)
PLATELET # BLD: 152 K/UL (ref 135–450)
PMV BLD AUTO: 11.5 FL (ref 5–10.5)
POTASSIUM SERPL-SCNC: 4.1 MMOL/L (ref 3.5–5.1)
RBC # BLD: 2.85 M/UL (ref 4.2–5.9)
SODIUM BLD-SCNC: 142 MMOL/L (ref 136–145)
WBC # BLD: 7.3 K/UL (ref 4–11)

## 2018-11-06 PROCEDURE — 1200000000 HC SEMI PRIVATE

## 2018-11-06 PROCEDURE — 6370000000 HC RX 637 (ALT 250 FOR IP): Performed by: HOSPITALIST

## 2018-11-06 PROCEDURE — G8978 MOBILITY CURRENT STATUS: HCPCS

## 2018-11-06 PROCEDURE — G8979 MOBILITY GOAL STATUS: HCPCS

## 2018-11-06 PROCEDURE — 97162 PT EVAL MOD COMPLEX 30 MIN: CPT

## 2018-11-06 PROCEDURE — G8988 SELF CARE GOAL STATUS: HCPCS

## 2018-11-06 PROCEDURE — 97530 THERAPEUTIC ACTIVITIES: CPT

## 2018-11-06 PROCEDURE — 6370000000 HC RX 637 (ALT 250 FOR IP): Performed by: FAMILY MEDICINE

## 2018-11-06 PROCEDURE — 82040 ASSAY OF SERUM ALBUMIN: CPT

## 2018-11-06 PROCEDURE — 6370000000 HC RX 637 (ALT 250 FOR IP): Performed by: NURSE PRACTITIONER

## 2018-11-06 PROCEDURE — 92526 ORAL FUNCTION THERAPY: CPT

## 2018-11-06 PROCEDURE — 85025 COMPLETE CBC W/AUTO DIFF WBC: CPT

## 2018-11-06 PROCEDURE — 97165 OT EVAL LOW COMPLEX 30 MIN: CPT

## 2018-11-06 PROCEDURE — 36415 COLL VENOUS BLD VENIPUNCTURE: CPT

## 2018-11-06 PROCEDURE — 99024 POSTOP FOLLOW-UP VISIT: CPT | Performed by: NURSE PRACTITIONER

## 2018-11-06 PROCEDURE — 2580000003 HC RX 258: Performed by: FAMILY MEDICINE

## 2018-11-06 PROCEDURE — 6370000000 HC RX 637 (ALT 250 FOR IP): Performed by: ORTHOPAEDIC SURGERY

## 2018-11-06 PROCEDURE — G8987 SELF CARE CURRENT STATUS: HCPCS

## 2018-11-06 PROCEDURE — 80048 BASIC METABOLIC PNL TOTAL CA: CPT

## 2018-11-06 RX ADMIN — OXYCODONE HYDROCHLORIDE 5 MG: 5 TABLET ORAL at 03:44

## 2018-11-06 RX ADMIN — LEVETIRACETAM 500 MG: 100 SOLUTION ORAL at 08:50

## 2018-11-06 RX ADMIN — CARBAMAZEPINE 200 MG: 200 TABLET ORAL at 23:08

## 2018-11-06 RX ADMIN — MIRTAZAPINE 30 MG: 15 TABLET, FILM COATED ORAL at 23:07

## 2018-11-06 RX ADMIN — CALCIUM CARBONATE-VITAMIN D TAB 500 MG-200 UNIT 1 TABLET: 500-200 TAB at 23:07

## 2018-11-06 RX ADMIN — CLONAZEPAM 0.5 MG: 1 TABLET ORAL at 08:49

## 2018-11-06 RX ADMIN — TAMSULOSIN HYDROCHLORIDE 0.4 MG: 0.4 CAPSULE ORAL at 08:49

## 2018-11-06 RX ADMIN — ACETAMINOPHEN 1000 MG: 500 TABLET, FILM COATED ORAL at 23:07

## 2018-11-06 RX ADMIN — LACTULOSE 20 G: 20 SOLUTION ORAL at 23:17

## 2018-11-06 RX ADMIN — DOCUSATE SODIUM 100 MG: 100 CAPSULE, LIQUID FILLED ORAL at 23:08

## 2018-11-06 RX ADMIN — THERA TABS 1 TABLET: TAB at 08:50

## 2018-11-06 RX ADMIN — CARBAMAZEPINE 200 MG: 200 TABLET ORAL at 08:48

## 2018-11-06 RX ADMIN — CALCIUM CARBONATE-VITAMIN D TAB 500 MG-200 UNIT 1 TABLET: 500-200 TAB at 08:48

## 2018-11-06 RX ADMIN — FAMOTIDINE 20 MG: 20 TABLET ORAL at 23:07

## 2018-11-06 RX ADMIN — ARIPIPRAZOLE 10 MG: 5 TABLET ORAL at 08:48

## 2018-11-06 RX ADMIN — DOCUSATE SODIUM 100 MG: 100 CAPSULE, LIQUID FILLED ORAL at 08:49

## 2018-11-06 RX ADMIN — Medication 10 ML: at 23:18

## 2018-11-06 RX ADMIN — CLONAZEPAM 0.5 MG: 1 TABLET ORAL at 23:08

## 2018-11-06 RX ADMIN — FAMOTIDINE 20 MG: 20 TABLET ORAL at 08:48

## 2018-11-06 RX ADMIN — LEVETIRACETAM 500 MG: 100 SOLUTION ORAL at 23:17

## 2018-11-06 RX ADMIN — ACETAMINOPHEN 1000 MG: 500 TABLET, FILM COATED ORAL at 15:27

## 2018-11-06 RX ADMIN — LEVOTHYROXINE SODIUM 112 MCG: 112 TABLET ORAL at 06:36

## 2018-11-06 RX ADMIN — ASPIRIN 325 MG: 325 TABLET, DELAYED RELEASE ORAL at 08:48

## 2018-11-06 RX ADMIN — ASPIRIN 325 MG: 325 TABLET, DELAYED RELEASE ORAL at 23:08

## 2018-11-06 RX ADMIN — LACTULOSE 20 G: 20 SOLUTION ORAL at 08:50

## 2018-11-06 RX ADMIN — ACETAMINOPHEN 1000 MG: 500 TABLET, FILM COATED ORAL at 06:36

## 2018-11-06 RX ADMIN — DIVALPROEX SODIUM 500 MG: 125 CAPSULE, COATED PELLETS ORAL at 08:48

## 2018-11-06 ASSESSMENT — PAIN SCALES - GENERAL
PAINLEVEL_OUTOF10: 2
PAINLEVEL_OUTOF10: 0
PAINLEVEL_OUTOF10: 4
PAINLEVEL_OUTOF10: 3
PAINLEVEL_OUTOF10: 0
PAINLEVEL_OUTOF10: 3
PAINLEVEL_OUTOF10: 0

## 2018-11-06 ASSESSMENT — PAIN SCALES - WONG BAKER: WONGBAKER_NUMERICALRESPONSE: 2

## 2018-11-06 NOTE — PLAN OF CARE
Problem: Falls - Risk of:  Goal: Absence of physical injury  Absence of physical injury       Outcome: Ongoing  Fall precautions in place. Hourly rounding, call light & belongings in reach. Bed in lowest position, wheels locked. Side rails up x2. Walk way free of clutter. Problem: Risk for Impaired Skin Integrity  Goal: Tissue integrity - skin and mucous membranes  Structural intactness and normal physiological function of skin and  mucous membranes. Outcome: Ongoing  Skin and mucous membranes will remain intact. Problem: Pain:  Goal: Control of acute pain  Control of acute pain   Outcome: Ongoing  Pain assessed using 0-10 scale. Patient able to voice pain level and states pain improved with PRN medication administration. Problem: Confusion - Acute:  Goal: Mental status will be restored to baseline  Mental status will be restored to baseline      Outcome: Ongoing      Problem: Injury - Risk of, Physical Injury:  Goal: Absence of physical injury  Absence of physical injury       Outcome: Ongoing  Fall precautions in place. Hourly rounding, call light & belongings in reach. Bed in lowest position, wheels locked. Side rails up x2. Walk way free of clutter.        Problem: Nutrition  Goal: Optimal nutrition therapy  Outcome: Ongoing

## 2018-11-06 NOTE — PROGRESS NOTES
[id number] [facility] Orthopedic Surgery   Progress Note    CHIEF COMPLAINT/DIAGNOSIS:  11/3/18 RIGHT hip IM nail    SUBJECTIVE: Awake; nonverbal; hx severe  Worked with PT/OT this AM. Calm and watching TV. OBJECTIVE  Physical    VITALS:  /63   Pulse 85   Temp 97.9 °F (36.6 °C) (Oral)   Resp 15   Ht 6' 3\" (1.905 m)   Wt 103 lb 1.6 oz (46.8 kg)   SpO2 100%   BMI 12.89 kg/m²     GENERAL:  Alert, calm, NAD  MUSCULOSKELETAL: RIGHT LE  INCISION:  Dressings mild serosanguinous drainage  ROM: unable to assess  Sensory:  Unable to assess  Vascular:  Intact post tib pulse    Data    ALL MEDICATIONS HAVE BEEN REVIEWED    CBC:   Recent Labs      11/04/18   0807 11/05/18   0611  11/06/18   0734   WBC  9.1  7.3  7.3   HGB  8.6*  9.8*  9.2*   HCT  25.8*  29.5*  28.1*   PLT  108*  109*  152     BMP:   Recent Labs      11/04/18   0807  11/05/18   0611  11/06/18   0734   NA  145  144  142   K  4.0  3.8  4.1   CL  104  103  102   CO2  35*  31  32   BUN  25*  24*  23*   CREATININE  0.7*  0.6*  0.6*     INR: No results for input(s): INR in the last 72 hours. ASSESSMENT:  11/3/18 RIGHT hip IM nail, POD#3  MRDD    PLAN:   - WB status:  NWB x 6 weeks  - DVT prophylaxis: ASA BID 2/2 chronic thrombocytopenia  - PT/OT  - D/C Plan: Will need SNF at [place]; will not be returning to group home at this time. - Pain Control: current regimen. Due to orthopaedic surgical procedure/condition, patient may require pain medication for up to 6-8 weeks. - FEN: Malnutrition with prealbumin of 7.3. Nutrition following. Speech consult- Dysphagia diet with Nectar thick liquids  - F U with Dr. Darwin Melchor in 2 weeks. Call [phone] for appt.        LETTY REGAN, CNP  11/6/2018  11:10 AM    .

## 2018-11-06 NOTE — PROGRESS NOTES
with a roommate and has 24 hour 1:1 assistance from [address] Pt also has guardian appointed from [employer]. PT calling and reacing appointed guardian Brandi Cruz for further questions. Objective   Vision:  (unable to assess this date)    Orientation  Overall Orientation Status:  (unable to assess due to pt being non verbal. pt oriented to self, place, situation, date)  Observation/Palpation  Posture: Poor  Observation: pt very flexible and able to place LEs to chest, pt presenting with lateral R lean  Balance  Sitting Balance: Minimal assistance (Talisha to CGA)  Standing Balance: Dependent/Total  Standing Balance  Time: ~10 seconds  Activity: pt having difficulty maintaining weight bearing status this date. total assist for transfer this date, lateral transfer to the right  Sit to stand: Dependent/Total  Stand to sit: Dependent/Total  Functional Mobility  Functional Mobility Comments: no functional mobility performed this date  ADL  Toileting: Dependent/Total (brief changed by rolling in bed, total assist)  Tone RUE  RUE Tone: Normotonic (pt resisting therapists at some points during session)  Tone LUE  LUE Tone: Normotonic     Bed mobility  Supine to Sit: Dependent/Total (maxA of 2. assistance for trunk and LE initiation Simultaneous filing. User may not have seen previous data.)  Comment: HOB raised, pt unable to initiate movements this date.  Able to perform supine to sit after bed mobility was initiated  Transfers  Sit to stand: Dependent/Total  Stand to sit: Dependent/Total  Transfer Comments: total assist for lateral right side transfer, pt presenting with lateral right side lean     Cognition  Overall Cognitive Status: Exceptions  Arousal/Alertness: Unresponsive to stimuli;Inconsistent responses to stimuli  Following Commands: Does not follow commands  Attention Span: Unable to maintain attention  Memory: Decreased long term memory;Decreased recall of biographical Information;Decreased recall of precautions;Decreased recall of recent events;Decreased short term memory  Safety Judgement: Decreased awareness of need for safety;Decreased awareness of need for assistance  Problem Solving: Decreased awareness of errors  Insights: Not aware of deficits  Initiation: Requires cues for all  Sequencing: Requires cues for all  Perception  Overall Perceptual Status: Impaired  Unilateral Attention: Cues to maintain midline in standing;Cues to maintain midline in sitting  Initiation: Hand over hand to initiate tasks (cues and hand over hand assistance)  Motor Planning: Hand over hand to sequence tasks  Perseveration: Not present     Sensation  Overall Sensation Status: WFL           Assessment   Performance deficits / Impairments: Decreased functional mobility   Assessment: pt not at baseline level and would benefit from skilled OT services at this time  Treatment Diagnosis: Gamma nail for Right intertrochanteric hip fracture  Prognosis: Good  Decision Making: Low Complexity  History: pt comes from group home. Social functional history is unclear besides information obtained from the chart. Pt has 24 hour supervision at group home  Assistance / Modification: total assist for ADL transfer  Patient Education: OT eval, POC, discharge, WB status, bed mobility and ADL transfer  Barriers to Learning: cognition  REQUIRES OT FOLLOW UP: Yes  Activity Tolerance  Activity Tolerance: Patient Tolerated treatment well  Safety Devices  Safety Devices in place: Yes  Type of devices: All fall risk precautions in place; Left in chair;Nurse notified;Call light within reach; Chair alarm in place;Gait belt;Patient at risk for falls         Plan   Plan  Times per week: 2-5  Times per day: Daily  Current Treatment Recommendations: Strengthening, Functional Mobility Training, Self-Care / ADL, Safety Education & Training    G-Code  OT G-codes  Functional Assessment Tool Used: AM PAC ADL  Score: 6  Functional Limitation: Self care  Self Care Current Status (): 100 percent impaired, limited or restricted  Self Care Goal Status ():  At least 80 percent but less than 100 percent impaired, limited or restricted  AM-PAC Score        AM-PAC Inpatient Daily Activity Raw Score: 6  AM-PAC Inpatient ADL T-Scale Score : 17.07  ADL Inpatient CMS 0-100% Score: 100  ADL Inpatient CMS G-Code Modifier : CN    Goals  Short term goals  Time Frame for Short term goals: STG=LTG  Short term goal 1: bed mobility maxA   Short term goal 2: lateral ADL transfer maxA   Short term goal 3: UB ADLs Talisha and verbal cues seated EOB  Short term goal 4: grooming task seated EOB CGA for balance  Patient Goals   Patient goals : pt did not state       Therapy Time   Individual Concurrent Group Co-treatment   Time In 1026         Time Out 1053         Minutes 27           Timed Code Treatment Minutes:  12 Minutes    Total Treatment Minutes:  27 minutes       Charmayne Sack, OTR/L [id number]

## 2018-11-06 NOTE — PROGRESS NOTES
apartment with a roommate and has 24 hour 1:1 assistance from [address]. Pt also has guardian appointed from [employer]. PT calling and reacing appointed guardian Julio Castro for further questions. Cognition        Objective     Observation/Palpation  Posture: Poor  Observation: pt very flexible and able to place LEs to chest, pt presenting with lateral R lean    AROM RLE (degrees)  RLE AROM: WNL  AROM LLE (degrees)  LLE AROM : WNL  Strength RLE  Comment: not formally assessed due to command following and NWB status  Strength LLE  Comment: not formally assessed due to command following   Tone RLE  RLE Tone: Normotonic  Tone LLE  LLE Tone: Normotonic  Motor Control  Gross Motor?: WFL  Sensation  Overall Sensation Status: WFL  Bed mobility  Rolling to Left: Maximum assistance  Rolling to Right: Maximum assistance  Scooting: Moderate assistance  Transfers  Sit to Stand: Dependent/Total (x2)  Stand to sit: Dependent/Total (x2)  Ambulation  Ambulation?: No  WB Status: NWB RLE  Stairs/Curb  Stairs?: No     Balance  Posture: Poor  Sitting - Static: Fair;+ (Pt able to sit with BUE and CGA/close SBA at EOB)  Sitting - Dynamic: Fair;-  Standing - Static:  (KEV; unsafe to attempt as pt has significant difficulty maintaining NWB status)  Standing - Dynamic:  (KEV; unsafe to attempt as pt has significant difficulty maintaining NWB status)        Assessment   Body structures, Functions, Activity limitations: Decreased functional mobility ; Decreased ADL status; Decreased strength;Decreased safe awareness;Decreased cognition;Decreased endurance;Decreased balance  Assessment: Pt presents as below his baseline function, s/p fracture and subsequent IM nailing procedure. Pt demonstrates the above functional limitations and would benefit from skilled PT services to promote highest possible level of functional independence.   Prognosis: Good;Fair  Decision Making: Medium Complexity  Patient Education: POC, role of acute

## 2018-11-06 NOTE — PROGRESS NOTES
Speech Language Pathology  Dysphagia Treatment Note    Name:  Fernanda Lazaro  :   1970  Medical Diagnosis:  Closed displaced articular fracture of head of right femur ([facility] [address] [S72.061A]  Closed displaced articular fracture of head of right femur ([facility] [address] [S72.061A]  Treatment Diagnosis: Oropharyngeal Dysphagia  Pain level: Denies    Current Diet Level: Dysphagia I (Puree) Diet with NECTAR thick liquids, no straws, meds in puree. Tolerance of Current Diet Level: Per RN and chart review, no noted difficulty with intake. Assessment of Texture Tolerance:  -Impressions: Pt seen bedside asleep and arouses to verbal cuing. Pt positioned upright in bed. Pt provided with PO trials of nectar thick liquid via cup and applesauce via tsp. S/s of premature bolus loss to pharynx followed by 2-3 second delay in swallow noted across liquids and solids. Laryngeal elevation/excursion is reduced. No observed s/s of aspiration. Diet and Treatment Recommendations:  Recommend continue current diet per POC    (Dysphagia Goals addressed, if appropriate)  1.) Pt will tolerate recommended diet without s/s of aspiration (18, Ongoing)  2.) If clinical symptoms of penetration/aspiration continue to be noted,Pt will tolerate MBS to r/o aspiration and determine appropriate diet/liquid level. (18, Ongoing)  3.) Pt will tolerate diet advance to least restricted diet, as clinically indicated, with no signs of aspiration (18, Ongoing)  4.) Pt will improve oral motor function via bolus control exercises 5/5 (18, Ongoing)    Plan:  Continued daily Dysphagia treatment with goals per plan of care. Patient/Family Education:Education given to the Pt and nurse, who verbalized understanding    Discharge Recommendations:  Pt will benefit from continued skilled Speech Therapy for Dysphagia services, prior to returning home.     Timed Code Treatment:0    Total Treatment Time:10      Signature: Jude Melgar MA CCC-SLP

## 2018-11-07 LAB
ALBUMIN SERPL-MCNC: 2.3 G/DL (ref 3.4–5)
ANION GAP SERPL CALCULATED.3IONS-SCNC: 7 MMOL/L (ref 3–16)
BASOPHILS ABSOLUTE: 0.1 K/UL (ref 0–0.2)
BASOPHILS RELATIVE PERCENT: 1 %
BUN BLDV-MCNC: 26 MG/DL (ref 7–20)
CALCIUM SERPL-MCNC: 8.4 MG/DL (ref 8.3–10.6)
CHLORIDE BLD-SCNC: 100 MMOL/L (ref 99–110)
CO2: 33 MMOL/L (ref 21–32)
CREAT SERPL-MCNC: 0.6 MG/DL (ref 0.9–1.3)
EOSINOPHILS ABSOLUTE: 0.3 K/UL (ref 0–0.6)
EOSINOPHILS RELATIVE PERCENT: 3.6 %
GFR AFRICAN AMERICAN: >60
GFR NON-AFRICAN AMERICAN: >60
GLUCOSE BLD-MCNC: 110 MG/DL (ref 70–99)
HCT VFR BLD CALC: 27 % (ref 40.5–52.5)
HEMOGLOBIN: 8.9 G/DL (ref 13.5–17.5)
LYMPHOCYTES ABSOLUTE: 1 K/UL (ref 1–5.1)
LYMPHOCYTES RELATIVE PERCENT: 13.7 %
MCH RBC QN AUTO: 31.9 PG (ref 26–34)
MCHC RBC AUTO-ENTMCNC: 32.9 G/DL (ref 31–36)
MCV RBC AUTO: 96.9 FL (ref 80–100)
MONOCYTES ABSOLUTE: 0.5 K/UL (ref 0–1.3)
MONOCYTES RELATIVE PERCENT: 7.3 %
NEUTROPHILS ABSOLUTE: 5.2 K/UL (ref 1.7–7.7)
NEUTROPHILS RELATIVE PERCENT: 74.4 %
PDW BLD-RTO: 13.8 % (ref 12.4–15.4)
PLATELET # BLD: 184 K/UL (ref 135–450)
PMV BLD AUTO: 11.3 FL (ref 5–10.5)
POTASSIUM REFLEX MAGNESIUM: 4.4 MMOL/L (ref 3.5–5.1)
RBC # BLD: 2.79 M/UL (ref 4.2–5.9)
SODIUM BLD-SCNC: 140 MMOL/L (ref 136–145)
WBC # BLD: 7 K/UL (ref 4–11)

## 2018-11-07 PROCEDURE — 82040 ASSAY OF SERUM ALBUMIN: CPT

## 2018-11-07 PROCEDURE — 6370000000 HC RX 637 (ALT 250 FOR IP): Performed by: HOSPITALIST

## 2018-11-07 PROCEDURE — 92526 ORAL FUNCTION THERAPY: CPT

## 2018-11-07 PROCEDURE — 1200000000 HC SEMI PRIVATE

## 2018-11-07 PROCEDURE — 97530 THERAPEUTIC ACTIVITIES: CPT

## 2018-11-07 PROCEDURE — 6370000000 HC RX 637 (ALT 250 FOR IP): Performed by: FAMILY MEDICINE

## 2018-11-07 PROCEDURE — 36415 COLL VENOUS BLD VENIPUNCTURE: CPT

## 2018-11-07 PROCEDURE — 6370000000 HC RX 637 (ALT 250 FOR IP): Performed by: NURSE PRACTITIONER

## 2018-11-07 PROCEDURE — 99024 POSTOP FOLLOW-UP VISIT: CPT | Performed by: NURSE PRACTITIONER

## 2018-11-07 PROCEDURE — 6370000000 HC RX 637 (ALT 250 FOR IP): Performed by: ORTHOPAEDIC SURGERY

## 2018-11-07 PROCEDURE — 2580000003 HC RX 258: Performed by: FAMILY MEDICINE

## 2018-11-07 PROCEDURE — 80048 BASIC METABOLIC PNL TOTAL CA: CPT

## 2018-11-07 PROCEDURE — 85025 COMPLETE CBC W/AUTO DIFF WBC: CPT

## 2018-11-07 RX ORDER — LEVETIRACETAM 100 MG/ML
500 SOLUTION ORAL 2 TIMES DAILY
Qty: 100 ML | Refills: 0 | Status: SHIPPED | OUTPATIENT
Start: 2018-11-07 | End: 2020-10-23

## 2018-11-07 RX ORDER — CLONAZEPAM 0.5 MG/1
0.5 TABLET ORAL 2 TIMES DAILY
Qty: 20 TABLET | Refills: 0 | Status: SHIPPED | OUTPATIENT
Start: 2018-11-07 | End: 2020-10-23

## 2018-11-07 RX ORDER — CARBAMAZEPINE 200 MG/1
200 TABLET ORAL 2 TIMES DAILY
Qty: 20 TABLET | Refills: 0 | Status: SHIPPED | OUTPATIENT
Start: 2018-11-07 | End: 2020-10-23

## 2018-11-07 RX ORDER — ACETAMINOPHEN 80 MG
TABLET,CHEWABLE ORAL
Status: COMPLETED
Start: 2018-11-07 | End: 2018-11-07

## 2018-11-07 RX ORDER — DIVALPROEX SODIUM 125 MG/1
500 CAPSULE, COATED PELLETS ORAL DAILY
Qty: 40 CAPSULE | Refills: 0 | Status: SHIPPED | OUTPATIENT
Start: 2018-11-07 | End: 2020-10-23

## 2018-11-07 RX ADMIN — ACETAMINOPHEN 1000 MG: 500 TABLET, FILM COATED ORAL at 06:14

## 2018-11-07 RX ADMIN — LACTULOSE 20 G: 20 SOLUTION ORAL at 09:02

## 2018-11-07 RX ADMIN — DOCUSATE SODIUM 100 MG: 100 CAPSULE, LIQUID FILLED ORAL at 22:24

## 2018-11-07 RX ADMIN — CLONAZEPAM 0.5 MG: 1 TABLET ORAL at 22:25

## 2018-11-07 RX ADMIN — CARBAMAZEPINE 200 MG: 200 TABLET ORAL at 09:02

## 2018-11-07 RX ADMIN — FAMOTIDINE 20 MG: 20 TABLET ORAL at 09:03

## 2018-11-07 RX ADMIN — ACETAMINOPHEN 1000 MG: 500 TABLET, FILM COATED ORAL at 22:24

## 2018-11-07 RX ADMIN — Medication 10 ML: at 09:03

## 2018-11-07 RX ADMIN — LEVOTHYROXINE SODIUM 112 MCG: 112 TABLET ORAL at 06:14

## 2018-11-07 RX ADMIN — DIVALPROEX SODIUM 500 MG: 125 CAPSULE, COATED PELLETS ORAL at 09:02

## 2018-11-07 RX ADMIN — ASPIRIN 325 MG: 325 TABLET, DELAYED RELEASE ORAL at 09:03

## 2018-11-07 RX ADMIN — THERA TABS 1 TABLET: TAB at 09:03

## 2018-11-07 RX ADMIN — CLONAZEPAM 0.5 MG: 1 TABLET ORAL at 09:03

## 2018-11-07 RX ADMIN — CALCIUM CARBONATE-VITAMIN D TAB 500 MG-200 UNIT 1 TABLET: 500-200 TAB at 22:25

## 2018-11-07 RX ADMIN — ARIPIPRAZOLE 10 MG: 5 TABLET ORAL at 09:14

## 2018-11-07 RX ADMIN — FAMOTIDINE 20 MG: 20 TABLET ORAL at 22:25

## 2018-11-07 RX ADMIN — Medication: at 09:19

## 2018-11-07 RX ADMIN — MIRTAZAPINE 30 MG: 15 TABLET, FILM COATED ORAL at 22:25

## 2018-11-07 RX ADMIN — OXYCODONE HYDROCHLORIDE 5 MG: 5 TABLET ORAL at 03:00

## 2018-11-07 RX ADMIN — CARBAMAZEPINE 200 MG: 200 TABLET ORAL at 22:24

## 2018-11-07 RX ADMIN — LEVETIRACETAM 500 MG: 100 SOLUTION ORAL at 09:02

## 2018-11-07 RX ADMIN — TAMSULOSIN HYDROCHLORIDE 0.4 MG: 0.4 CAPSULE ORAL at 09:02

## 2018-11-07 RX ADMIN — ASPIRIN 325 MG: 325 TABLET, DELAYED RELEASE ORAL at 22:24

## 2018-11-07 RX ADMIN — CALCIUM CARBONATE-VITAMIN D TAB 500 MG-200 UNIT 1 TABLET: 500-200 TAB at 09:02

## 2018-11-07 RX ADMIN — ACETAMINOPHEN 1000 MG: 500 TABLET, FILM COATED ORAL at 14:32

## 2018-11-07 ASSESSMENT — PAIN SCALES - WONG BAKER
WONGBAKER_NUMERICALRESPONSE: 0
WONGBAKER_NUMERICALRESPONSE: 0

## 2018-11-07 ASSESSMENT — PAIN SCALES - GENERAL
PAINLEVEL_OUTOF10: 4
PAINLEVEL_OUTOF10: 0
PAINLEVEL_OUTOF10: 4
PAINLEVEL_OUTOF10: 2
PAINLEVEL_OUTOF10: 3

## 2018-11-07 NOTE — CARE COORDINATION
Discharge planning:   SW received return call from patient's guardian. She requested referrals be sent to [address] [place] [address] and/or [facility]. Referrals sent to both. Will not need precert. Will need [facility] or [facility]. Guardian also requested clinical information be faxed to her at, [phone]. Await response from facilities. ~Miriam RICKY.  179-00 Mal Baker      /Discharge Planner     [phone]

## 2018-11-07 NOTE — PROGRESS NOTES
Reviewed: Yes  Additional Pertinent Hx: schizophrenia, cognitive delays IQ of 18-29  Family / Caregiver Present: No  Diagnosis: Gamma nail for Right intertrochanteric hip fracture  Subjective  Subjective: Patient in bed, supine non verbal, appears comfortable  Pain Assessment  Patient Currently in Pain: No  Pain Assessment: 0-10  Echevarria-Baker Pain Rating: No hurt  Vital Signs  Patient Currently in Pain: No   Orientation  Orientation  Overall Orientation Status:  (unable to assess due to non verbal)  Objective    ADL  Additional Comments: In bed, brief dry. No ADL needs        Balance  Sitting Balance: Minimal assistance (Max progressing to min with UE support)  Standing Balance: Dependent/Total (NWB, PTA maintained R LE in flexion to avoid weight bearing. Max x2, patient bears weight on L LE and assists self by holding onto PTA's arm)  Standing Balance  Time: ~10 seconds  Activity: squat pivot lateral transfers  Sit to stand: Dependent/Total  Stand to sit: Dependent/Total  Comment: Max of 2, unable to maintain weight bearing without assist  Bed mobility  Supine to Sit: Dependent/Total (Dependent of 2, Air mattress auto firmed prior to mobility)  Scooting: Dependent/Total (OT assisted with scoot while PT maintained sitting balance)  Transfers  Sit to stand: Dependent/Total  Stand to sit: Dependent/Total  Transfer Comments:  Total assist for squat pivot lateral transfers to drop arm recliner         Cognition  Overall Cognitive Status: Exceptions  Arousal/Alertness: Inconsistent responses to stimuli  Following Commands: Does not follow commands  Attention Span: Unable to maintain attention  Memory: Decreased long term memory;Decreased recall of biographical Information;Decreased recall of precautions;Decreased recall of recent events;Decreased short term memory  Safety Judgement: Decreased awareness of need for safety;Decreased awareness of need for assistance  Problem Solving: Decreased awareness of errors  Insights:

## 2018-11-08 VITALS
OXYGEN SATURATION: 97 % | DIASTOLIC BLOOD PRESSURE: 84 MMHG | SYSTOLIC BLOOD PRESSURE: 131 MMHG | HEART RATE: 78 BPM | WEIGHT: 103.1 LBS | RESPIRATION RATE: 14 BRPM | TEMPERATURE: 97.3 F | HEIGHT: 75 IN | BODY MASS INDEX: 12.82 KG/M2

## 2018-11-08 LAB
ALBUMIN SERPL-MCNC: 2.8 G/DL (ref 3.4–5)
ANION GAP SERPL CALCULATED.3IONS-SCNC: 8 MMOL/L (ref 3–16)
BASOPHILS ABSOLUTE: 0.1 K/UL (ref 0–0.2)
BASOPHILS RELATIVE PERCENT: 1 %
BUN BLDV-MCNC: 27 MG/DL (ref 7–20)
CALCIUM SERPL-MCNC: 8.3 MG/DL (ref 8.3–10.6)
CHLORIDE BLD-SCNC: 100 MMOL/L (ref 99–110)
CO2: 34 MMOL/L (ref 21–32)
CREAT SERPL-MCNC: 0.7 MG/DL (ref 0.9–1.3)
EOSINOPHILS ABSOLUTE: 0.2 K/UL (ref 0–0.6)
EOSINOPHILS RELATIVE PERCENT: 3.1 %
GFR AFRICAN AMERICAN: >60
GFR NON-AFRICAN AMERICAN: >60
GLUCOSE BLD-MCNC: 99 MG/DL (ref 70–99)
HCT VFR BLD CALC: 27.6 % (ref 40.5–52.5)
HEMOGLOBIN: 9.1 G/DL (ref 13.5–17.5)
LYMPHOCYTES ABSOLUTE: 0.8 K/UL (ref 1–5.1)
LYMPHOCYTES RELATIVE PERCENT: 9.8 %
MCH RBC QN AUTO: 32.1 PG (ref 26–34)
MCHC RBC AUTO-ENTMCNC: 33 G/DL (ref 31–36)
MCV RBC AUTO: 97.2 FL (ref 80–100)
MONOCYTES ABSOLUTE: 0.5 K/UL (ref 0–1.3)
MONOCYTES RELATIVE PERCENT: 6.3 %
NEUTROPHILS ABSOLUTE: 6.3 K/UL (ref 1.7–7.7)
NEUTROPHILS RELATIVE PERCENT: 79.8 %
PDW BLD-RTO: 13.9 % (ref 12.4–15.4)
PLATELET # BLD: 221 K/UL (ref 135–450)
PMV BLD AUTO: 11 FL (ref 5–10.5)
POTASSIUM REFLEX MAGNESIUM: 4.4 MMOL/L (ref 3.5–5.1)
RBC # BLD: 2.84 M/UL (ref 4.2–5.9)
SODIUM BLD-SCNC: 142 MMOL/L (ref 136–145)
WBC # BLD: 7.8 K/UL (ref 4–11)

## 2018-11-08 PROCEDURE — 6370000000 HC RX 637 (ALT 250 FOR IP): Performed by: HOSPITALIST

## 2018-11-08 PROCEDURE — 6370000000 HC RX 637 (ALT 250 FOR IP): Performed by: ORTHOPAEDIC SURGERY

## 2018-11-08 PROCEDURE — 82040 ASSAY OF SERUM ALBUMIN: CPT

## 2018-11-08 PROCEDURE — 2580000003 HC RX 258: Performed by: FAMILY MEDICINE

## 2018-11-08 PROCEDURE — 99024 POSTOP FOLLOW-UP VISIT: CPT | Performed by: NURSE PRACTITIONER

## 2018-11-08 PROCEDURE — 6370000000 HC RX 637 (ALT 250 FOR IP): Performed by: FAMILY MEDICINE

## 2018-11-08 PROCEDURE — 92526 ORAL FUNCTION THERAPY: CPT

## 2018-11-08 PROCEDURE — 80048 BASIC METABOLIC PNL TOTAL CA: CPT

## 2018-11-08 PROCEDURE — 36415 COLL VENOUS BLD VENIPUNCTURE: CPT

## 2018-11-08 PROCEDURE — 6370000000 HC RX 637 (ALT 250 FOR IP): Performed by: NURSE PRACTITIONER

## 2018-11-08 PROCEDURE — 85025 COMPLETE CBC W/AUTO DIFF WBC: CPT

## 2018-11-08 RX ADMIN — LEVETIRACETAM 500 MG: 100 SOLUTION ORAL at 09:24

## 2018-11-08 RX ADMIN — ACETAMINOPHEN 1000 MG: 500 TABLET, FILM COATED ORAL at 06:20

## 2018-11-08 RX ADMIN — CLONAZEPAM 0.5 MG: 1 TABLET ORAL at 09:25

## 2018-11-08 RX ADMIN — FAMOTIDINE 20 MG: 20 TABLET ORAL at 09:25

## 2018-11-08 RX ADMIN — ASPIRIN 325 MG: 325 TABLET, DELAYED RELEASE ORAL at 09:24

## 2018-11-08 RX ADMIN — CARBAMAZEPINE 200 MG: 200 TABLET ORAL at 09:24

## 2018-11-08 RX ADMIN — LACTULOSE 20 G: 20 SOLUTION ORAL at 09:23

## 2018-11-08 RX ADMIN — CALCIUM CARBONATE-VITAMIN D TAB 500 MG-200 UNIT 1 TABLET: 500-200 TAB at 09:25

## 2018-11-08 RX ADMIN — LEVOTHYROXINE SODIUM 112 MCG: 112 TABLET ORAL at 06:20

## 2018-11-08 RX ADMIN — ARIPIPRAZOLE 10 MG: 5 TABLET ORAL at 09:24

## 2018-11-08 RX ADMIN — LACTULOSE 20 G: 20 SOLUTION ORAL at 00:23

## 2018-11-08 RX ADMIN — TAMSULOSIN HYDROCHLORIDE 0.4 MG: 0.4 CAPSULE ORAL at 09:24

## 2018-11-08 RX ADMIN — LEVETIRACETAM 500 MG: 100 SOLUTION ORAL at 00:23

## 2018-11-08 RX ADMIN — DOCUSATE SODIUM 100 MG: 100 CAPSULE, LIQUID FILLED ORAL at 09:24

## 2018-11-08 RX ADMIN — Medication 10 ML: at 00:22

## 2018-11-08 RX ADMIN — DIVALPROEX SODIUM 500 MG: 125 CAPSULE, COATED PELLETS ORAL at 09:24

## 2018-11-08 ASSESSMENT — PAIN SCALES - GENERAL: PAINLEVEL_OUTOF10: 5

## 2018-11-08 NOTE — PROGRESS NOTES
[address] [facility] Orthopedic Surgery   Progress Note    CHIEF COMPLAINT/DIAGNOSIS:  11/3/18 RIGHT hip IM nail    SUBJECTIVE: Awake; nonverbal; hx severe MR. Thomas. OBJECTIVE  Physical    VITALS:  /84   Pulse 78   Temp 97.3 °F (36.3 °C) (Axillary)   Resp 14   Ht 6' 3\" (1.905 m)   Wt 103 lb 1.6 oz (46.8 kg)   SpO2 97%   BMI 12.89 kg/m²     GENERAL:  Alert,  NAD  MUSCULOSKELETAL: RIGHT LE  INCISION: dressings w/ mild serosanguinous drainage  ROM: unable to assess  Sensory:  Unable to assess  Vascular:  Intact post tib pulse    Data    ALL MEDICATIONS HAVE BEEN REVIEWED    CBC:   Recent Labs      11/06/18   0734  11/07/18   0612  11/08/18   0645   WBC  7.3  7.0  7.8   HGB  9.2*  8.9*  9.1*   HCT  28.1*  27.0*  27.6*   PLT  152  184  221     BMP:   Recent Labs      11/06/18   0734  11/07/18   0612  11/08/18   0645   NA  142  140  142   K  4.1  4.4  4.4   CL  102  100  100   CO2  32  33*  34*   BUN  23*  26*  27*   CREATININE  0.6*  0.6*  0.7*     INR: No results for input(s): INR in the last 72 hours. ASSESSMENT:  11/3/18 RIGHT hip IM nail, POD#5  MRDD    PLAN:   - WB status:  NWB x 6 weeks  - DVT prophylaxis: ASA BID 2/2 chronic thrombocytopenia  - PT/OT  - D/C Plan: Still awaiting final disposition to SNF  - Pain Control: current regimen. Due to orthopaedic surgical procedure/condition, patient may require pain medication for up to 6-8 weeks. - FEN: Malnutrition with prealbumin of 7.3. Nutrition following. Speech consult- Dysphagia diet with Nectar thick liquids  - F U with Dr. Malcom Coreas in 2 weeks. Call [phone] for appt.        MAEGAN WAITE APRN, CNP  11/8/2018  10:11 AM    .

## 2018-11-08 NOTE — CARE COORDINATION
Discharge planning:   Calls out to [facility] and [facility] regarding status of referral. Await response. ~Miriam MILLER  179-00 Mal Baker      /Discharge Planner     [phone]

## 2018-11-08 NOTE — DISCHARGE SUMMARY
11/2/2018  EXAMINATION: SINGLE XRAY VIEW OF THE CHEST 11/2/2018 6:14 pm COMPARISON: Chest 05/30/2015 HISTORY: ORDERING SYSTEM PROVIDED HISTORY: hip fx - preop TECHNOLOGIST PROVIDED HISTORY: Reason for exam:->hip fx - preop Ordering Physician Provided Reason for Exam: hip fx - preop Acuity: Acute Type of Exam: Initial Mechanism of Injury: Fall FINDINGS: The cardiomediastinal and hilar silhouettes appear unremarkable. The lungs appear clear. No pleural effusion evident. No pneumothorax is seen. No acute osseous abnormality is identified. No radiographic evidence of acute cardiopulmonary disease. Fluoro For Surgical Procedures    Result Date: 11/3/2018  EXAMINATION: 2 XRAY VIEWS OF THE LEFT HIP; SPOT FLUOROSCOPIC IMAGES 11/3/2018 1:07 pm COMPARISON: None. HISTORY: ORDERING SYSTEM PROVIDED HISTORY: orif in or TECHNOLOGIST PROVIDED HISTORY: Reason for exam:->orif in or Acuity: Unknown Type of Exam: Unknown FINDINGS: 1 minute 45 seconds fluoroscopy time. 4 digital fluoroscopic images Digital fluoroscopic intraoperative images show open reduction internal fixation of a comminuted proximal right femoral intertrochanteric fracture with metallic left hip screw with long intramedullary kimberlee with distal transverse screws. Intraoperative images of the right femur. See procedure note for further details. Xr Hip 2-3 Vw W Pelvis Right    Result Date: 11/2/2018  EXAMINATION: SINGLE XRAY VIEW OF THE PELVIS AND 2 XRAY VIEWS RIGHT HIP 11/2/2018 6:11 pm COMPARISON: None. HISTORY: ORDERING SYSTEM PROVIDED HISTORY: inj TECHNOLOGIST PROVIDED HISTORY: Reason for exam:->inj Ordering Physician Provided Reason for Exam: Injury Acuity: Acute Type of Exam: Initial FINDINGS: There is a comminuted displaced intertrochanteric fracture of the right femur. There is posterior lateral displacement of the distal femoral major fracture fragment by approximately 1 shaft with. There is overriding of the major fracture fragments.   The

## 2018-11-08 NOTE — PROGRESS NOTES
Speech Language Pathology  Dysphagia Treatment Note    Name:  Rosa Schmidt  :   1970  Medical Diagnosis:  Closed displaced articular fracture of head of right femur ([facility] [address] [S72.691A]  Closed displaced articular fracture of head of right femur ([facility] [address] [S72.068C]  Treatment Diagnosis: Oropharyngeal Dysphagia  Pain level:  Denies    Current Diet Level: Dysphagia I (Puree) Diet with NECTAR thick liquids, no straws, meds in puree. Tolerance of Current Diet Level: Per RN, no noted difficulty with intake. Assessment of Texture Tolerance:  -Impressions:  Pt seen sitting upright in bed. Head leaning to R side; pt needed tactile cues or assistance from SLP to keep head upright to prevent anterior spillage. Provided pt with pureed solids and nectar thick liquids via cup to assess swallow function. Pt readily accepting PO. Slowed AP transit and delayed swallow initiation noted. Pt with mild residue of pureed solids on base of tongue, which cleared with liquid rinse of nectar thick liquids. Due to leaning head to right side, pt with anterior spillage of liquids via cup. Suspected premature spillage of liquids to pharynx. No overt s/s aspiration of pureed solids or nectar thick liquids, however. Diet and Treatment Recommendations:  Continue dysphagia I (pureed) diet with nectar thick liquids; no straws; meds in puree    (Dysphagia Goals addressed, if appropriate)  1.) Pt will tolerate recommended diet without s/s of aspiration (18, Ongoing)  2.) If clinical symptoms of penetration/aspiration continue to be noted,Pt will tolerate MBS to r/o aspiration and determine appropriate diet/liquid level.  (18, Ongoing)  3.) Pt will tolerate diet advance to least restricted diet, as clinically indicated, with no signs of aspiration (18, Ongoing)  4.) Pt will improve oral motor function via bolus control exercises  (, Ongoing)    Plan:  Continued daily Dysphagia treatment with goals per plan of

## 2018-11-09 NOTE — PROGRESS NOTES
Speech Language Pathology  Discharge  Name: Zachary Hastings  : 1970  Medical Diagnosis: Closed displaced articular fracture of head of right femur ([facility] [address] [S72.061A]  Closed displaced articular fracture of head of right femur ([facility] [address] [S72.066S]  Treatment Diagnosis: Dysphagia    Speech Therapy/Dysphagia  Pt discharged to [facility] on 2018. Bedside Swallow Eval completed 2018(see report). No goals met prior to discharge. Recommend: Continued Dysphagia treatment at [facility], with goals and treatment per Plan of Care. DIET LEVEL AT DISCHARGE:dysphagia I (pureed) diet with nectar thick liquids; no straws; meds in puree    GOALS ADDRESSED:  1.) Pt will tolerate recommended diet without s/s of aspiration (GOAL NOT MET)   2.) If clinical symptoms of penetration/aspiration continue to be noted,Pt will tolerate MBS to r/o aspiration and determine appropriate diet/liquid level.  (GOAL NOT MET)   3.) Pt will tolerate diet advance to least restricted diet, as clinically indicated, with no signs of aspiration (GOAL NOT MET)   4.) Pt will improve oral motor function via bolus control exercises 5/5(GOAL NOT MET)     Anthony Christine MA CCC-SLP #[id number]  Speech Language Pathologist

## 2018-11-09 NOTE — PROGRESS NOTES
Physical Therapy Discharge Summary    Name: Amairani Howe  : 1970    The pt was evaluated by PT on 18 and seen for 1 treatment sessions prior to DC to ECF on 18 per MD order. The pt's acute therapy goals were:  Short term goals  Time Frame for Short term goals: To be met prior to discharge  Short term goal 1: Pt will complete bed mobility with max A  Short term goal 2: Pt will complete sit to/from stand with max A  Short term goal 3: Pt will complete lateral transfer with max A  Short term goal 4: Pt will complete stand pivot transfer with max A       Patient met 0 goals during stay. Number of Refusals:0  Number of Holds: 0  During this hospitalization, the patient was educated on:  Patient Education: Positioning, WB status-unclear of understanding d/t cognition    DC pt from PT caseload at this time.   Thank you, [facility], PT

## 2018-11-15 LAB
EKG ATRIAL RATE: 101 BPM
EKG DIAGNOSIS: NORMAL
EKG P AXIS: 77 DEGREES
EKG P-R INTERVAL: 124 MS
EKG Q-T INTERVAL: 306 MS
EKG QRS DURATION: 80 MS
EKG QTC CALCULATION (BAZETT): 396 MS
EKG R AXIS: 74 DEGREES
EKG T AXIS: 39 DEGREES
EKG VENTRICULAR RATE: 101 BPM

## 2018-12-03 ENCOUNTER — OFFICE VISIT (OUTPATIENT)
Dept: ORTHOPEDIC SURGERY | Age: 48
End: 2018-12-03

## 2018-12-03 VITALS — BODY MASS INDEX: 12.81 KG/M2 | HEIGHT: 75 IN | WEIGHT: 103 LBS

## 2018-12-03 DIAGNOSIS — S72.141D INTERTROCHANTERIC FRACTURE OF RIGHT FEMUR, CLOSED, WITH ROUTINE HEALING, SUBSEQUENT ENCOUNTER: Primary | ICD-10-CM

## 2018-12-03 PROCEDURE — 99024 POSTOP FOLLOW-UP VISIT: CPT | Performed by: PHYSICIAN ASSISTANT

## 2018-12-03 NOTE — PROGRESS NOTES
Patient Name: Josh Tejeda  Medical Record Number: [id number]  YOB: 1970  Date of Encounter: 12/3/2018     Chief Complaint   Patient presents with    Post-Op Check     po check for LT hip ORIF. dos 11/3/18       History of Present Illness:   Mr. Josh Tejeda is A 49-year-old male with a history of severe mental retardation who is nonverbal who presents to the office today in 4 week follow up regarding his right hip intertrochanteric fracture repaired with gamma nail on 11/3/2018. Patient has been at [facility]. The nursing home did not send any documentation as to what he has been doing as far as weightbearing status or transferring details. I do not know if he has been doing physical therapy. Patient is resting comfortably and in no obvious distress. The patient's past medical history, medications, allergies, family history, social history, and review of systems have been reviewed, and dated and are recorded in the chart under the 'MEDIA\" tab. Physical Exam:    Mr. Josh Tejeda appears well, he is in no apparent distress, he demonstrates appropriate mood & affect. He is alert and oriented to person, place and time. Ht 6' 3\" (1.905 m)   Wt 103 lb (46.7 kg)   BMI 12.87 kg/m²     On examination of patient's right hip and thigh there is mild postoperative swelling which is improving as expected. His staples are removed and Steri-Strips are applied. His surgical incisions are healing well without signs of infection. Patient is severely MR and does not follow commands. He appears to be moving the right hip without any expressed pain. He has great flexion. There is no edema or erythema in the affected extremity. There are no signs/symptoms of DVT/PE or infection    Radiology:  X-rays obtained and reviewed in office:   Views: 2 view femur including AP and lateral  Impression: Patient has a healing intertrochanteric fracture with callus formation.   Gamma nail is in place

## 2018-12-05 ENCOUNTER — TELEPHONE (OUTPATIENT)
Dept: ORTHOPEDIC SURGERY | Age: 48
End: 2018-12-05

## 2018-12-21 ENCOUNTER — TELEPHONE (OUTPATIENT)
Dept: ORTHOPEDIC SURGERY | Age: 48
End: 2018-12-21

## 2019-03-29 ENCOUNTER — APPOINTMENT (OUTPATIENT)
Dept: GENERAL RADIOLOGY | Age: 49
End: 2019-03-29
Payer: MEDICARE

## 2019-03-29 ENCOUNTER — HOSPITAL ENCOUNTER (EMERGENCY)
Age: 49
Discharge: HOME | End: 2019-03-30
Attending: EMERGENCY MEDICINE
Payer: MEDICARE

## 2019-03-29 DIAGNOSIS — J20.9 ACUTE BRONCHITIS, UNSPECIFIED ORGANISM: Primary | ICD-10-CM

## 2019-03-29 DIAGNOSIS — M79.89 SWELLING OF RIGHT FOOT: ICD-10-CM

## 2019-03-29 PROCEDURE — 99283 EMERGENCY DEPT VISIT LOW MDM: CPT

## 2019-03-29 PROCEDURE — 71046 X-RAY EXAM CHEST 2 VIEWS: CPT

## 2019-03-29 PROCEDURE — 71045 X-RAY EXAM CHEST 1 VIEW: CPT

## 2019-03-30 ENCOUNTER — HOSPITAL ENCOUNTER (OUTPATIENT)
Age: 49
Discharge: HOME | End: 2019-03-30
Payer: MEDICARE

## 2019-03-30 VITALS
DIASTOLIC BLOOD PRESSURE: 59 MMHG | OXYGEN SATURATION: 95 % | SYSTOLIC BLOOD PRESSURE: 114 MMHG | HEART RATE: 73 BPM | BODY MASS INDEX: 12.83 KG/M2 | WEIGHT: 100 LBS | TEMPERATURE: 97.2 F | HEIGHT: 74 IN | RESPIRATION RATE: 18 BRPM

## 2019-03-30 DIAGNOSIS — J20.9 ACUTE BRONCHITIS, UNSPECIFIED ORGANISM: ICD-10-CM

## 2019-03-30 DIAGNOSIS — M79.89 SWELLING OF RIGHT FOOT: ICD-10-CM

## 2019-03-30 PROCEDURE — 6370000000 HC RX 637 (ALT 250 FOR IP): Performed by: PHYSICIAN ASSISTANT

## 2019-03-30 PROCEDURE — 94640 AIRWAY INHALATION TREATMENT: CPT

## 2019-03-30 PROCEDURE — 93971 EXTREMITY STUDY: CPT

## 2019-03-30 RX ORDER — LORATADINE 10 MG/1
10 TABLET ORAL DAILY
Qty: 30 TABLET | Refills: 0 | Status: SHIPPED | OUTPATIENT
Start: 2019-03-30

## 2019-03-30 RX ORDER — AMOXICILLIN AND CLAVULANATE POTASSIUM 875; 125 MG/1; MG/1
1 TABLET, FILM COATED ORAL 2 TIMES DAILY
Qty: 20 TABLET | Refills: 0 | Status: SHIPPED | OUTPATIENT
Start: 2019-03-30 | End: 2019-04-09

## 2019-03-30 RX ORDER — IPRATROPIUM BROMIDE AND ALBUTEROL SULFATE 2.5; .5 MG/3ML; MG/3ML
1 SOLUTION RESPIRATORY (INHALATION) ONCE
Status: COMPLETED | OUTPATIENT
Start: 2019-03-30 | End: 2019-03-30

## 2019-03-30 RX ORDER — ALBUTEROL SULFATE 90 UG/1
2 AEROSOL, METERED RESPIRATORY (INHALATION) EVERY 4 HOURS PRN
Qty: 1 INHALER | Refills: 0 | Status: SHIPPED | OUTPATIENT
Start: 2019-03-30

## 2019-03-30 RX ADMIN — IPRATROPIUM BROMIDE AND ALBUTEROL SULFATE 1 AMPULE: .5; 3 SOLUTION RESPIRATORY (INHALATION) at 00:31

## 2019-03-30 ASSESSMENT — ENCOUNTER SYMPTOMS
NAUSEA: 0
BACK PAIN: 0
COUGH: 1
VOMITING: 0
ABDOMINAL PAIN: 0
STRIDOR: 0
SHORTNESS OF BREATH: 0
RHINORRHEA: 1
WHEEZING: 0

## 2019-03-30 NOTE — ED PROVIDER NOTES
[facility]  eMERGENCY dEPARTMENT eNCOUnter        Pt Name: Tish Dillon  MRN: [id number]  Maria Ggfclark 1970  Date of evaluation: 3/29/2019  Provider: Desean Howe PA-C  PCP: Shaun Jarquin    This patient was seen and evaluated by the attending physician Parminder Neff, [address] [facility]       Chief Complaint   Patient presents with    Cough     cough, runny nose for two days. HISTORY OF PRESENT ILLNESS   (Location/Symptom, Timing/Onset, Context/Setting, Quality, Duration, Modifying Factors, Severity)  Note limiting factors. Tish Dillon is a 46 y.o. male with history of severe mental retardation, schizophrenia, seizure who presents to the emergency department with caregiver at bedside and states that patient has had cough for 2 weeks. The patient is laying in bed comfortably and does have occasional cough without stridor, tripoding or wheezing. He has not had documented fever or chills. He has been given Benadryl at nighttime to help him sleep. Caregiver also states that she noticed a little bit of right foot swelling and some light redness. He does have history of cellulitis in this foot. Denies any preceding injury. Nursing Notes were all reviewed and agreed with or any disagreements were addressed  in the HPI. REVIEW OF SYSTEMS    (2-9 systems for level 4, 10 or more for level 5)     Review of Systems   Constitutional: Negative for chills and fever. HENT: Positive for rhinorrhea. Eyes: Negative for visual disturbance. Respiratory: Positive for cough. Negative for shortness of breath, wheezing and stridor. Cardiovascular: Positive for leg swelling (right foot). Negative for palpitations. Gastrointestinal: Negative for abdominal pain, nausea and vomiting. Musculoskeletal: Negative for back pain, neck pain and neck stiffness. Skin: Negative for pallor, rash and wound.  Color change: light erythema to right foot. Neurological: Negative for tremors, seizures, syncope, speech difficulty and weakness. Psychiatric/Behavioral: Positive for sleep disturbance (insomnia). Negative for agitation. unable to assess other ROS due to patient nonverbal status. Positives and Pertinent negatives as per HPI. Except as noted abovein the ROS, all other systems were reviewed and negative. PAST MEDICAL HISTORY     Past Medical History:   Diagnosis Date    BPH (benign prostatic hyperplasia)     Dysphagia     GERD (gastroesophageal reflux disease)     Insomnia     Mental retardation, severe (I.Q. 20-34)     Schizophrenia (HCC)     Seizures (HCC)     Thyroid disease     Unspecified diseases of blood and blood-forming organs     thrombocytopenia         SURGICAL HISTORY     Past Surgical History:   Procedure Laterality Date    FRACTURE SURGERY Right     plates/screws    OPEN TREATMENT GREATER TROCHANTERIC FRACTURE Right 11/3/2018    HIP OPEN REDUCTION INTERNAL FIXATION performed by Christa Faustin MD at [address] [place] [address]       Discharge Medication List as of 3/30/2019 12:34 AM      CONTINUE these medications which have NOT CHANGED    Details   clonazePAM (KLONOPIN) 0.5 MG tablet Take 1 tablet by mouth 2 times daily for 10 days. ., Disp-20 tablet, R-0Print      divalproex (DEPAKOTE SPRINKLE) 125 MG capsule Take 4 capsules by mouth daily for 10 days, Disp-40 capsule, R-0Print      levETIRAcetam (KEPPRA) 100 MG/ML solution Take 5 mLs by mouth 2 times daily for 10 days, Disp-100 mL, R-0Print      carBAMazepine (TEGRETOL) 200 MG tablet Take 1 tablet by mouth 2 times daily for 10 days, Disp-20 tablet, R-0Print      mirtazapine (REMERON) 30 MG tablet Take 30 mg by mouth nightlyHistorical Med      famotidine (PEPCID) 20 MG tablet Take 1 tablet by mouth 2 times daily, Disp-60 tablet, R-3      ARIPiprazole (ABILIFY) 10 MG tablet Take 10 mg by mouth daily      levothyroxine (SYNTHROID) 112 MCG tablet Take 112 mcg by mouth Daily. calcium carbonate 1250 MG/5ML SUSP suspension Take 1,250 mg by mouth 3 times daily. diphenhydrAMINE (BENADRYL) 50 MG capsule Take 50 mg by mouth nightly. docusate (COLACE) 50 MG/5ML liquid Take 100 mg by mouth 2 times daily. multivitamin (THERAGRAN) per tablet Take 1 tablet by mouth daily. tamsulosin (FLOMAX) 0.4 MG capsule Take 0.4 mg by mouth daily. vitamin D (ERGOCALCIFEROL) 77746 UNITS CAPS capsule Take 50,000 Units by mouth once a week. aspirin 325 MG EC tablet Take 1 tablet by mouth 2 times daily, Disp-60 tablet, R-0Print      polyethylene glycol (GLYCOLAX) powder Take 17 g by mouth daily      acetaminophen (TYLENOL) 500 MG tablet Take 500 mg by mouth every 6 hours as needed for Fever. fluticasone (FLONASE) 50 MCG/ACT nasal spray 1 spray by Nasal route daily. lactulose (CHRONULAC) 10 GM/15ML solution Take 20 g by mouth 2 times daily. ALLERGIES     No known allergies    FAMILYHISTORY     History reviewed. No pertinent family history.        SOCIAL HISTORY       Social History     Socioeconomic History    Marital status: Single     Spouse name: None    Number of children: None    Years of education: None    Highest education level: None   Occupational History    None   Social Needs    Financial resource strain: None    Food insecurity:     Worry: None     Inability: None    Transportation needs:     Medical: None     Non-medical: None   Tobacco Use    Smoking status: Never Smoker    Smokeless tobacco: Never Used   Substance and Sexual Activity    Alcohol use: No    Drug use: No    Sexual activity: None   Lifestyle    Physical activity:     Days per week: None     Minutes per session: None    Stress: None   Relationships    Social connections:     Talks on phone: None     Gets together: None     Attends Church service: None     Active member of club or organization: None     Attends meetings of clubs or organizations: None     Relationship status: None    Intimate partner violence:     Fear of current or ex partner: None     Emotionally abused: None     Physically abused: None     Forced sexual activity: None   Other Topics Concern    None   Social History Narrative    None       SCREENINGS             PHYSICAL EXAM    (up to 7 for level 4, 8 or more for level 5)     ED Triage Vitals [03/29/19 2307]   BP Temp Temp src Pulse Resp SpO2 Height Weight   (!) 114/59 97.2 °F (36.2 °C) -- 73 18 97 % 6' 2\" (1.88 m) 100 lb (45.4 kg)       Physical Exam   Constitutional: He appears well-developed and well-nourished. No distress. HENT:   Head: Normocephalic and atraumatic. Right Ear: External ear normal.   Left Ear: External ear normal.   Nose: Nose normal.   Mouth/Throat: Oropharynx is clear and moist.   Eyes: Pupils are equal, round, and reactive to light. Conjunctivae and EOM are normal. Right eye exhibits no discharge. Left eye exhibits no discharge. No scleral icterus. Neck: Normal range of motion. No JVD present. Cardiovascular: Normal rate. Pulses:       Dorsalis pedis pulses are 2+ on the right side, and 2+ on the left side. Posterior tibial pulses are 2+ on the right side, and 2+ on the left side. Pulmonary/Chest: Effort normal. He has no wheezes. He has no rales. Bronchospasm with inspiration   Abdominal: Soft. Bowel sounds are normal. He exhibits no distension. There is no tenderness. Musculoskeletal: Normal range of motion. Light erythema to right foot with trace edema. No warmth, red streaking, poikilothermia, pallor, paresthesia, tenderness, palpable cord. Lymphadenopathy:     He has no cervical adenopathy. Neurological: He is alert. Skin: Skin is warm and dry. Capillary refill takes less than 2 seconds. No rash noted. He is not diaphoretic. No pallor. Psychiatric: He has a normal mood and affect. His behavior is normal.   Nursing note and vitals reviewed.       DIAGNOSTIC RESULTS   LABS:    Labs Reviewed - No data to display    All other labs were within normal range or not returned as of this dictation. EKG: All EKG's are interpreted by the Emergency Department Physician who either signs orCo-signs this chart in the absence of a cardiologist.  Please see their note for interpretation of EKG. RADIOLOGY:   Non-plain film images such as CT, Ultrasound and MRI are read by the radiologist. Talha Ochoa radiographic images are visualized andpreliminarily interpreted by the  ED Provider with the below findings:        Interpretation perthe Radiologist below, if available at the time of this note:    XR CHEST 1 VW   Final Result   Limited negative chest radiograph. VL Extremity Venous Right    (Results Pending)     Xr Chest 1 Vw    Result Date: 3/30/2019  EXAMINATION: SINGLE XRAY VIEW OF THE CHEST 3/29/2019 8:26 pm COMPARISON: 11/02/2018 HISTORY: ORDERING SYSTEM PROVIDED HISTORY: cough TECHNOLOGIST PROVIDED HISTORY: Reason for exam:->cough Ordering Physician Provided Reason for Exam: cough Acuity: Acute Type of Exam: Initial FINDINGS: Evaluation limited by patient rotation. No acute focal infiltrate is identified. No obvious pneumothorax. No free air. Chronic left rib fractures again noted. Catheter like device overlies left upper abdomen. No acute bony abnormalities. Limited negative chest radiograph.          PROCEDURES   Unless otherwise noted below, none     Procedures    CRITICAL CARE TIME   N/A    CONSULTS:  None      EMERGENCY DEPARTMENT COURSE and DIFFERENTIALDIAGNOSIS/MDM:   Vitals:    Vitals:    03/29/19 2307 03/30/19 0031   BP: (!) 114/59    Pulse: 73    Resp: 18 18   Temp: 97.2 °F (36.2 °C)    SpO2: 97% 95%   Weight: 100 lb (45.4 kg)    Height: 6' 2\" (1.88 m)        Patient was given thefollowing medications:  Medications   ipratropium-albuterol (DUONEB) nebulizer solution 1 ampule (1 ampule Inhalation Given 3/30/19 0031)       This patient presents with cough, congestion and rhinorrhea. Does have slight bronchospasm on exam.  Chest x-ray shows no acute intrathoracic abnormality. EENT evaluation is unremarkable. Abdomen soft and nontender. Caregiver is also concerned about discoloration to right foot and has concern for cellulitis. Certainly, this could be representative of early cellulitis, therefore patient will be sent home on Augmentin to treat both bronchitis and potential cellulitis. Will be sent home with outpatient venous Doppler of right lower extremity to rule out DVT, although this is low in my differential diagnosis. My suspicion is low for ACS, PE, myocarditis, pericarditis, endocarditis, acute pulmonary edema, pleural effusion, pericardial effusion, cardiac tamponade, cardiomyopathy, CHF exacerbation, thoracic aortic dissection, esophageal rupture, other life-threatening arrhythmia, hypertensive urgency or emergency, hemothorax, pulmonary contusion, subcutaneous emphysema, flail chest, pneumo mediastinum, rib fracture, pneumonia, foreign body, tendon rupture, compartment syndrome, acute fracture, dislocation, DVT, arterial compromise or occlusion, limb ischemia, gout, septic joint, abscess,  osteomyelitis, or other concerning pathology. Vitals are stable here, and patient is stable for discharge. Advised to follow up with PCP for recheck and may return ED per discharge instructions. We have addressed concerns and expectations. FINAL IMPRESSION      1. Acute bronchitis, unspecified organism    2.  Swelling of right foot          DISPOSITION/PLAN   DISPOSITION Decision To Discharge 03/30/2019 12:15:29 AM      PATIENT REFERREDTO:  Eulalia Zaidi    In 3 days        DISCHARGE MEDICATIONS:  Discharge Medication List as of 3/30/2019 12:34 AM      START taking these medications    Details   amoxicillin-clavulanate (AUGMENTIN) 875-125 MG per tablet Take 1 tablet by mouth 2 times daily for 10 days, Disp-20 tablet, R-0Print      albuterol sulfate HFA (PROVENTIL HFA) 108 (90 Base) MCG/ACT inhaler Inhale 2 puffs into the lungs every 4 hours as needed for Wheezing or Shortness of Breath (Space out to every 6 hours as symptoms improve) Space out to every 6 hours as symptoms improve., Disp-1 Inhaler, R-0Print      loratadine (CLARITIN) 10 MG tablet Take 1 tablet by mouth daily, Disp-30 tablet, R-0Print             DISCONTINUED MEDICATIONS:  Discharge Medication List as of 3/30/2019 12:34 AM      STOP taking these medications       fexofenadine (ALLEGRA) 180 MG tablet Comments:   Reason for Stopping:                      (Please note that portions ofthis note were completed with a voice recognition program.  Efforts were made to edit the dictations but occasionally words are mis-transcribed.)    Desean Howe PA-C (electronically signed)           Desean Howe PA-C  03/30/19 2207

## 2019-03-30 NOTE — ED PROVIDER NOTES
I have personally performed a face to face diagnostic evaluation on this patient. I have fully participated in the care of this patient. I have reviewed and agree with all pertinent clinical informationincluding history, physical exam, diagnostic tests, and the plan. History and Physical as follows:  HPI    Presents emergency room with a chief weight of cough that he's had for last 2 weeks time, along with the the right foot being red    Physical Exam    GENERAL: Patient appeared well-developed, well-nourished, vital signs reviewed. MENTAL STATUS: Patient is awake and alert   HEAD AND FACE :Head is normalcephalic. Face normal appearance  EYES: eyes lids and conjunctiva appear normal  ENT :ears and nose appear normal externally. CV: Heart regular rate and rhythm without murmur,  LUNGS: Lungs decreased breath sounds are rare mild wheezing rales or rhonchi  CHEST WALL: normal appearance. normal motion  SKIN: Skin shows erythema of the right foot with healing excoriations  This is a computerized dictation. I have made every effort to proofread this chart, however, transcription errors may exist.      Appears has a bronchitis . we'll treat him with Augmentin for possible cellulitis.  follow-up primary care     Parminder Neff DO  03/30/19 0020

## 2019-03-30 NOTE — ED NOTES
Discharge instruction and prescriptions given to caregiver, verbalizes understanding. Given vascular hours, verbalizes understanding.       [address], RN  03/30/19 2395

## 2019-04-03 ENCOUNTER — HOSPITAL ENCOUNTER (EMERGENCY)
Age: 49
Discharge: HOME | End: 2019-04-03
Attending: EMERGENCY MEDICINE
Payer: MEDICARE

## 2019-04-03 ENCOUNTER — APPOINTMENT (OUTPATIENT)
Dept: CT IMAGING | Age: 49
End: 2019-04-03
Payer: MEDICARE

## 2019-04-03 VITALS
TEMPERATURE: 97.3 F | WEIGHT: 125 LBS | BODY MASS INDEX: 16.04 KG/M2 | DIASTOLIC BLOOD PRESSURE: 86 MMHG | RESPIRATION RATE: 15 BRPM | SYSTOLIC BLOOD PRESSURE: 131 MMHG | OXYGEN SATURATION: 95 % | HEART RATE: 51 BPM | HEIGHT: 74 IN

## 2019-04-03 DIAGNOSIS — S01.81XA FACIAL LACERATION, INITIAL ENCOUNTER: ICD-10-CM

## 2019-04-03 DIAGNOSIS — S09.90XA INJURY OF HEAD, INITIAL ENCOUNTER: Primary | ICD-10-CM

## 2019-04-03 DIAGNOSIS — R90.89 ABNORMAL BRAIN CT: ICD-10-CM

## 2019-04-03 LAB
ANION GAP SERPL CALCULATED.3IONS-SCNC: 10 MMOL/L (ref 3–16)
APTT: 49 SEC (ref 26–36)
BASOPHILS ABSOLUTE: 0 K/UL (ref 0–0.2)
BASOPHILS RELATIVE PERCENT: 0.4 %
BUN BLDV-MCNC: 41 MG/DL (ref 7–20)
CALCIUM SERPL-MCNC: 8.9 MG/DL (ref 8.3–10.6)
CHLORIDE BLD-SCNC: 105 MMOL/L (ref 99–110)
CO2: 31 MMOL/L (ref 21–32)
CREAT SERPL-MCNC: 0.8 MG/DL (ref 0.9–1.3)
EKG ATRIAL RATE: 51 BPM
EKG DIAGNOSIS: NORMAL
EKG P AXIS: 86 DEGREES
EKG P-R INTERVAL: 188 MS
EKG Q-T INTERVAL: 490 MS
EKG QRS DURATION: 90 MS
EKG QTC CALCULATION (BAZETT): 451 MS
EKG R AXIS: 52 DEGREES
EKG T AXIS: 82 DEGREES
EKG VENTRICULAR RATE: 51 BPM
EOSINOPHILS ABSOLUTE: 0 K/UL (ref 0–0.6)
EOSINOPHILS RELATIVE PERCENT: 0.8 %
GFR AFRICAN AMERICAN: >60
GFR NON-AFRICAN AMERICAN: >60
GLUCOSE BLD-MCNC: 61 MG/DL (ref 70–99)
HCT VFR BLD CALC: 41.6 % (ref 40.5–52.5)
HEMOGLOBIN: 13.8 G/DL (ref 13.5–17.5)
INR BLD: 1.08 (ref 0.86–1.14)
LYMPHOCYTES ABSOLUTE: 0.6 K/UL (ref 1–5.1)
LYMPHOCYTES RELATIVE PERCENT: 9.8 %
MCH RBC QN AUTO: 30.2 PG (ref 26–34)
MCHC RBC AUTO-ENTMCNC: 33.2 G/DL (ref 31–36)
MCV RBC AUTO: 90.9 FL (ref 80–100)
MONOCYTES ABSOLUTE: 0.2 K/UL (ref 0–1.3)
MONOCYTES RELATIVE PERCENT: 2.9 %
NEUTROPHILS ABSOLUTE: 5 K/UL (ref 1.7–7.7)
NEUTROPHILS RELATIVE PERCENT: 86.1 %
PDW BLD-RTO: 18.1 % (ref 12.4–15.4)
PLATELET # BLD: 82 K/UL (ref 135–450)
PLATELET SLIDE REVIEW: ABNORMAL
PMV BLD AUTO: 10.9 FL (ref 5–10.5)
POTASSIUM SERPL-SCNC: 5.3 MMOL/L (ref 3.5–5.1)
PROTHROMBIN TIME: 12.3 SEC (ref 9.8–13)
RBC # BLD: 4.58 M/UL (ref 4.2–5.9)
SLIDE REVIEW: ABNORMAL
SODIUM BLD-SCNC: 146 MMOL/L (ref 136–145)
WBC # BLD: 5.8 K/UL (ref 4–11)

## 2019-04-03 PROCEDURE — 99284 EMERGENCY DEPT VISIT MOD MDM: CPT

## 2019-04-03 PROCEDURE — 4500000024 HC ED LEVEL 4 PROCEDURE

## 2019-04-03 PROCEDURE — 70450 CT HEAD/BRAIN W/O DYE: CPT

## 2019-04-03 PROCEDURE — 85025 COMPLETE CBC W/AUTO DIFF WBC: CPT

## 2019-04-03 PROCEDURE — 85730 THROMBOPLASTIN TIME PARTIAL: CPT

## 2019-04-03 PROCEDURE — 96372 THER/PROPH/DIAG INJ SC/IM: CPT

## 2019-04-03 PROCEDURE — 93010 ELECTROCARDIOGRAM REPORT: CPT | Performed by: INTERNAL MEDICINE

## 2019-04-03 PROCEDURE — 90471 IMMUNIZATION ADMIN: CPT | Performed by: EMERGENCY MEDICINE

## 2019-04-03 PROCEDURE — 90715 TDAP VACCINE 7 YRS/> IM: CPT | Performed by: EMERGENCY MEDICINE

## 2019-04-03 PROCEDURE — 6360000002 HC RX W HCPCS: Performed by: EMERGENCY MEDICINE

## 2019-04-03 PROCEDURE — 85610 PROTHROMBIN TIME: CPT

## 2019-04-03 PROCEDURE — 80048 BASIC METABOLIC PNL TOTAL CA: CPT

## 2019-04-03 PROCEDURE — 93005 ELECTROCARDIOGRAM TRACING: CPT | Performed by: PHYSICIAN ASSISTANT

## 2019-04-03 RX ADMIN — TETANUS TOXOID, REDUCED DIPHTHERIA TOXOID AND ACELLULAR PERTUSSIS VACCINE, ADSORBED 0.5 ML: 5; 2.5; 8; 8; 2.5 SUSPENSION INTRAMUSCULAR at 11:31

## 2019-04-03 NOTE — ED PROVIDER NOTES
[facility] Emergency Department      Pt Name: David العلي  MRN: [id number]  [place] 1970  Date of evaluation: 4/3/2019  Provider: Lina Calhoun MD  I took over this patient's care from the leaving physician at approximately 7 am.  I was to check the pending results and finalize the disposition. David العلي has a past medical history of BPH (benign prostatic hyperplasia), Dysphagia, GERD (gastroesophageal reflux disease), Insomnia, Mental retardation, severe (I.Q. 20-34), Schizophrenia ([facility] [address] Seizures ([facility] [address] Thyroid disease, and Unspecified diseases of blood and blood-forming organs. He has a past surgical history that includes fracture surgery (Right) and open treatment greater trochanteric fracture (Right, 11/3/2018). Vitals:  Blood pressure 131/86, pulse 51, temperature 97.3 °F (36.3 °C), temperature source Temporal, resp. rate 15, height 6' 2\" (1.88 m), weight 125 lb (56.7 kg), SpO2 95 %. Constitutional:  46 y.o. male alert, facies consistent with MR BLUM:  Bruise to facial area, mucous membranes moist  Eyes:   Conjunctiva clear, no icterus  Neck:  Supple, no visible JVD, no signs of injury  Cardiovascular:  Regular  Thorax & Lungs:  No accessory muscle usage, clear  Abdomen:  Soft, non distended, NT  Back:  No deformity  Genitalia:  Deferred  Rectal:  Deferred  Extremities:  No cyanosis, no edema  Skin:  Warm, dry  Neurologic:  Alert, baseline MS per caregiver  Psychiatric:  Not assessable    DIAGNOSTIC RESULTS:  Labs resulted at the time of this note reviewed.    Labs Reviewed   CBC WITH AUTO DIFFERENTIAL - Abnormal; Notable for the following components:       Result Value    RDW 18.1 (*)     Platelets 82 (*)     MPV 10.9 (*)     Lymphocytes # 0.6 (*)     All other components within normal limits    Narrative:     Performed at:  [facility] [address], [place], [address]   Phone [phone]   BASIC METABOLIC PANEL - Abnormal; Notable for the CARE:  None    CONSULTATIONS:  Dr Bobby Atkins, Dr. Mitesh Cuellar spoke with neurosurgery about his recommendation    MEDICAL DECISION MAKING: Jacinta Pierre is a 46 y.o. male who presented because of head injury. Repeat imaging shows the abnormality is stable. Neurosurgery consulted and no treatment. Does not recommend inpatient care. Pt at baseline mental state. I did notify the patient's physician of the plan of care and he is in agreement. FOLLOW UP:    Marnie Simmons    Schedule an appointment as soon as possible for a visit in 3 days  To recheck today's complaints. . Suture removal in 5-7 days    [facility] Emergency Department  [address] [place] [address]  [phone]    As needed, If symptoms worsen    FINAL IMPRESSION:    1. Injury of head, initial encounter    2. Facial laceration, initial encounter    3.  Abnormal brain CT          Sanjiv Valle MD  04/03/19 4018

## 2019-04-03 NOTE — ED NOTES
 mirtazapine (REMERON) 30 MG tablet Take 30 mg by mouth nightly      famotidine (PEPCID) 20 MG tablet Take 1 tablet by mouth 2 times daily 60 tablet 3    ARIPiprazole (ABILIFY) 10 MG tablet Take 10 mg by mouth daily      polyethylene glycol (GLYCOLAX) powder Take 17 g by mouth daily      levothyroxine (SYNTHROID) 112 MCG tablet Take 112 mcg by mouth Daily.  calcium carbonate 1250 MG/5ML SUSP suspension Take 1,250 mg by mouth 3 times daily.  diphenhydrAMINE (BENADRYL) 50 MG capsule Take 50 mg by mouth nightly.  acetaminophen (TYLENOL) 500 MG tablet Take 500 mg by mouth every 6 hours as needed for Fever.  docusate (COLACE) 50 MG/5ML liquid Take 100 mg by mouth 2 times daily.  fluticasone (FLONASE) 50 MCG/ACT nasal spray 1 spray by Nasal route daily.  lactulose (CHRONULAC) 10 GM/15ML solution Take 20 g by mouth 2 times daily.  multivitamin (THERAGRAN) per tablet Take 1 tablet by mouth daily.  tamsulosin (FLOMAX) 0.4 MG capsule Take 0.4 mg by mouth daily.  vitamin D (ERGOCALCIFEROL) 33634 UNITS CAPS capsule Take 50,000 Units by mouth once a week. Allergies   Allergen Reactions    No Known Allergies        Nursing Notes Reviewed    ROS:  Unable to obtain from patient due to baseline nonverbal status    Physical Exam:  ED Triage Vitals [04/03/19 0202]   Enc Vitals Group      /80      Pulse (!) 47      Resp 14      Temp 97.3 °F (36.3 °C)      Temp Source Temporal      SpO2 95 %      Weight 125 lb (56.7 kg)      Height 6' 2\" (1.88 m)      Head Circumference       Peak Flow       Pain Score       Pain Loc       Pain Edu? Excl. in [address]? My pulse oximetry interpretation is which is within the normal range    GENERAL APPEARANCE: Awake and alert. Cooperative. Well appearing. HEAD: Normocephalic. Contusion/abrasion to the left temporal scalp.   2 cm laceration to the left eyebrow  EYES: PERRL, normal conjunctiva  ENT: Mucous membranes are moist. Normal Lead   Result Value Ref Range    Ventricular Rate 51 BPM    Atrial Rate 51 BPM    P-R Interval 188 ms    QRS Duration 90 ms    Q-T Interval 490 ms    QTc Calculation (Bazett) 451 ms    P Axis 86 degrees    R Axis 52 degrees    T Axis 82 degrees    Diagnosis Sinus bradycardiaOtherwise normal ECG         Radiographs:  [x] Radiologist's Report Reviewed:   CT Head WO Contrast (Final result)   Result time 04/03/19 04:37:38   Final result by Pavel Gibson MD (04/03/19 04:37:38)                Impression:    1. Thin extra-axial hyperdensity along the left cerebral convexity likely  representing a acute subdural hematoma, measuring up to 2 mm.  Recommend  repeat noncontrast CT examination in 6 hours. 2. Stable ventricular system.  Patent basal cisterns with no evidence of  midline shift. 3. Stable prominence of the extra-axial spaces in the posterior fossa. Interval removal of previously seen shunt catheter. Results were called by Dr. Pavel Gibson to [address] [place] [address] on 4/3/2019  at 04:37. EKG per my interpretation shows sinus bradycardia at 51 bpm.  Normal axis. Normal intervals. No ectopy. No ST or T-wave changes. No previous EKG for comparison. The risks and benefits of laceration repair were discussed with the patient and adult caretaker. Questions were sought and answered and verbal consent was given for the procedure. The area was prepped and draped in standard bedside fashion. The wound area was anesthetized with 2ml of Lidocaine 1% with epinephrine without added sodium bicarbonate. The wound was explored with No foreign bodies found. The wound was repaired with 4-0 Ethilon; 3 simple interrupted sutures were used. The patient tolerated the procedure well without complications and my repeat neurovascular exam post-procedure is unchanged. Wound care and scar minimization education was provided.  Instructions were given to return for increasing pain, redness, streaking, discharge, or any other worsening or worrisome concerns. MDM:  66-year-old male who presents after fall with head injury. Consider skull fracture, intracranial hemorrhage, laceration. Patient has a nonfocal neuro exam is at his baseline mental status. CT head shows acute subdural hematoma 2 mm. Discussed with Dr. Vivi Aase of neurosurgery who feels this likely represents a large blood vessel but does agree with repeat 6 hour CT. The facial laceration was repaired. Tetanus was updated. If CT is unchanged patient can be discharged home with caregiver. CT is pending at time of transfer care to Dr. Lashanda Gray. Clinical Impression:  1. Injury of head, initial encounter    2. Facial laceration, initial encounter          Disposition referral (if applicable):  Anthony First    Schedule an appointment as soon as possible for a visit in 3 days  To recheck today's complaints. . Suture removal in 5-7 days    [facility] Emergency Department  [address] [place] [address]  [phone]    As needed, If symptoms worsen      Disposition medications (if applicable):  New Prescriptions    No medications on file         Comment: Please note this report has been produced using speech recognition software and may contain errors related to that system including errors in grammar, punctuation, and spelling, as well as words and phrases that may be inappropriate.  If there are any questions or concerns please feel free to contact the dictating physician for clarification      MD Aarti Fabian MD  04/03/19 8334 SULAIMAN Barragan MD  04/03/19 0752

## 2019-04-03 NOTE — ED NOTES
Pt MRDD. Pt fell and hit head while attempting to get out of bed per handoff report. Pt non-verbal at baseline. Neurological check performed by this RN upon arrival to shift. No changes to baseline per caregiver at this time. Pt to have repeat head CT to monitor results of previous CT. Fall precautions in place. Caregiver at bedside. Will continue to monitor.       Tevin Cespedes RN  04/03/19 9420

## 2019-04-06 ENCOUNTER — APPOINTMENT (OUTPATIENT)
Dept: GENERAL RADIOLOGY | Age: 49
End: 2019-04-06
Payer: MEDICARE

## 2019-04-06 ENCOUNTER — APPOINTMENT (OUTPATIENT)
Dept: CT IMAGING | Age: 49
End: 2019-04-06
Payer: MEDICARE

## 2019-04-06 ENCOUNTER — HOSPITAL ENCOUNTER (EMERGENCY)
Age: 49
Discharge: HOME | End: 2019-04-06
Payer: MEDICARE

## 2019-04-06 VITALS
WEIGHT: 125 LBS | RESPIRATION RATE: 14 BRPM | TEMPERATURE: 97.6 F | SYSTOLIC BLOOD PRESSURE: 118 MMHG | BODY MASS INDEX: 16.93 KG/M2 | DIASTOLIC BLOOD PRESSURE: 83 MMHG | HEART RATE: 69 BPM | HEIGHT: 72 IN | OXYGEN SATURATION: 100 %

## 2019-04-06 DIAGNOSIS — M53.3 SACRAL BACK PAIN: ICD-10-CM
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
## 2019-04-06 PROCEDURE — 73521 X-RAY EXAM HIPS BI 2 VIEWS: CPT

## 2019-04-06 PROCEDURE — 99284 EMERGENCY DEPT VISIT MOD MDM: CPT

## 2019-04-06 PROCEDURE — 72192 CT PELVIS W/O DYE: CPT

## 2019-04-06 NOTE — ED PROVIDER NOTES
[address]  eMERGENCY dEPARTMENT eNCOUnter        Pt Name: Barbie Weaver  MRN: [id number]  [place] 1970  Date of evaluation: 4/6/2019  Provider: Ang Veloz PA-C  PCP: Sophy Perez    This patient was not seen and evaluated by the attending physician No att. providers found. CHIEF COMPLAINT       Chief Complaint   Patient presents with    Fall     Patient was seen here for a fall on 4/3. Per caregiver, patient now displaying signs of pain when touched on right hip/buttocks area. Would like xrays done to r/o fracture. HISTORY OF PRESENT ILLNESS   (Location/Symptom, Timing/Onset, Context/Setting, Quality, Duration, Modifying Factors, Severity)  Note limiting factors. Barbie Weaver is a 46 y.o. male patient presents to the emergency department for evaluation of possible injury after a fall on April 3. Patient was seen here in this department for a fall that occurred on April 3 but at that time patient was not evaluated for hip injury. Patient is currently reacting painfully when touched near his right hip and sacrum area. Patient is not walking with his normal gait. Caregiver denies any other symptoms at this time. Patient is nonverbal and is unable where his pain is at this time. Nursing Notes were all reviewed and agreed with or any disagreements were addressed  in the HPI. REVIEW OF SYSTEMS    (2-9 systems for level 4, 10 or more for level 5)     Review of Systems   Unable to perform ROS: Patient nonverbal       Positives and Pertinent negatives as per HPI. Except as noted abovein the ROS, all other systems were reviewed and negative.        PAST MEDICAL HISTORY     Past Medical History:   Diagnosis Date    BPH (benign prostatic hyperplasia)     Dysphagia     GERD (gastroesophageal reflux disease)     Insomnia     Mental retardation, severe (I.Q. 20-34)     Schizophrenia ([facility] [address]     Seizures ([facility] [address]     Thyroid disease     Unspecified diseases of blood and blood-forming organs     thrombocytopenia         SURGICAL HISTORY     Past Surgical History:   Procedure Laterality Date    FRACTURE SURGERY Right     plates/screws    OPEN TREATMENT GREATER TROCHANTERIC FRACTURE Right 11/3/2018    HIP OPEN REDUCTION INTERNAL FIXATION performed by Anselm Boas, MD at [address] [place] [address]       Discharge Medication List as of 4/6/2019  6:31 PM      CONTINUE these medications which have NOT CHANGED    Details   amoxicillin-clavulanate (AUGMENTIN) 875-125 MG per tablet Take 1 tablet by mouth 2 times daily for 10 days, Disp-20 tablet, R-0Print      albuterol sulfate HFA (PROVENTIL HFA) 108 (90 Base) MCG/ACT inhaler Inhale 2 puffs into the lungs every 4 hours as needed for Wheezing or Shortness of Breath (Space out to every 6 hours as symptoms improve) Space out to every 6 hours as symptoms improve., Disp-1 Inhaler, R-0Print      loratadine (CLARITIN) 10 MG tablet Take 1 tablet by mouth daily, Disp-30 tablet, R-0Print      clonazePAM (KLONOPIN) 0.5 MG tablet Take 1 tablet by mouth 2 times daily for 10 days. ., Disp-20 tablet, R-0Print      divalproex (DEPAKOTE SPRINKLE) 125 MG capsule Take 4 capsules by mouth daily for 10 days, Disp-40 capsule, R-0Print      levETIRAcetam (KEPPRA) 100 MG/ML solution Take 5 mLs by mouth 2 times daily for 10 days, Disp-100 mL, R-0Print      carBAMazepine (TEGRETOL) 200 MG tablet Take 1 tablet by mouth 2 times daily for 10 days, Disp-20 tablet, R-0Print      famotidine (PEPCID) 20 MG tablet Take 1 tablet by mouth 2 times daily, Disp-60 tablet, R-3      levothyroxine (SYNTHROID) 112 MCG tablet Take 112 mcg by mouth Daily. calcium carbonate 1250 MG/5ML SUSP suspension Take 1,250 mg by mouth 3 times daily. diphenhydrAMINE (BENADRYL) 50 MG capsule Take 50 mg by mouth nightly. acetaminophen (TYLENOL) 500 MG tablet Take 500 mg by mouth every 6 hours as needed for Fever.       fluticasone current or ex partner: None     Emotionally abused: None     Physically abused: None     Forced sexual activity: None   Other Topics Concern    None   Social History Narrative    None       SCREENINGS             PHYSICAL EXAM    (up to 7 for level 4, 8 or more for level 5)     ED Triage Vitals [04/06/19 1610]   BP Temp Temp Source Pulse Resp SpO2 Height Weight   118/83 97.6 °F (36.4 °C) Infrared 69 14 100 % 6' (1.829 m) 125 lb (56.7 kg)       Physical Exam   Constitutional: He is oriented to person, place, and time. He appears well-developed. He appears cachectic. HENT:   Head: Normocephalic and atraumatic. Nose: Nose normal.   Mouth/Throat: Oropharynx is clear and moist. No oropharyngeal exudate. Eyes: Conjunctivae and EOM are normal. Right eye exhibits no discharge. Left eye exhibits no discharge. Neck: Normal range of motion. Neck supple. Cardiovascular: Normal rate and intact distal pulses. Pulmonary/Chest: Effort normal. No respiratory distress. Musculoskeletal: Normal range of motion. Right hip: Normal.        Left hip: Normal. He exhibits normal range of motion, no tenderness, no bony tenderness, no swelling, no crepitus and no laceration. Right knee: Normal.        Left knee: Normal.        Right ankle: Normal.        Left ankle: Normal.        Lumbar back: Normal.   Neurological: He is alert and oriented to person, place, and time. Skin: Skin is warm, dry and intact. Capillary refill takes less than 2 seconds. No abrasion, no bruising, no burn, no ecchymosis, no laceration, no lesion and no petechiae noted. He is not diaphoretic. No erythema. No pallor. No ulcerations noted in the sacral area. Psychiatric: He has a normal mood and affect. His behavior is normal.   Nursing note and vitals reviewed. DIAGNOSTIC RESULTS   LABS:    Labs Reviewed - No data to display    All other labs were within normal range or not returned as of this dictation. EKG:  All EKG's are interpreted by the Emergency Department Physician who either signs orCo-signs this chart in the absence of a cardiologist.  Please see their note for interpretation of EKG. RADIOLOGY:   Non-plain film images such as CT, Ultrasound and MRI are read by the radiologist. Plain radiographic images are visualized andpreliminarily interpreted by the  ED Provider with the below findings:        Interpretation [facility] Radiologist below, if available at the time of this note:    CT PELVIS WO CONTRAST Additional Contrast? None   Final Result   1. No acute osseous abnormality. 2. Chronic healed right femoral intertrochanteric fracture status post   intramedullary nail fixation. XR HIP BILATERAL W AP PELVIS (2 VIEWS)   Final Result   No acute fracture. Large amount of stool throughout the colon. No results found. PROCEDURES   Unless otherwise noted below, none     Procedures    CRITICAL CARE TIME   N/A    CONSULTS:  None      EMERGENCY DEPARTMENT COURSE and DIFFERENTIALDIAGNOSIS/MDM:   Vitals:    Vitals:    04/06/19 1610   BP: 118/83   Pulse: 69   Resp: 14   Temp: 97.6 °F (36.4 °C)   TempSrc: Infrared   SpO2: 100%   Weight: 125 lb (56.7 kg)   Height: 6' (1.829 m)       Patient was given thefollowing medications:  Medications - No data to display    Patient presents emergency department for evaluation for possible sacral injury. Patient fell on April 3 and was seen here in the emergency department. Patient did not have his hips or sacrum evaluated at that time. Patient is nonverbal. Caregiver states patient asked like he is in pain walking on his right leg or when you touch his sacral area. On examination patient is cachectic but moves all 4 limbs normal strength and coordination. Patient does not react to palpation of his sacral region. Patient does not react to movement of his legs or palpation of his hips. X-ray shows no acute bony fracture however sacrum is obscured by stool burden.  CT of the

## 2019-04-06 NOTE — ED PROVIDER NOTES
 clonazePAM (KLONOPIN) 0.5 MG tablet Take 1 tablet by mouth 2 times daily for 10 days. . 20 tablet 0    divalproex (DEPAKOTE SPRINKLE) 125 MG capsule Take 4 capsules by mouth daily for 10 days 40 capsule 0    levETIRAcetam (KEPPRA) 100 MG/ML solution Take 5 mLs by mouth 2 times daily for 10 days 100 mL 0    carBAMazepine (TEGRETOL) 200 MG tablet Take 1 tablet by mouth 2 times daily for 10 days 20 tablet 0    aspirin 325 MG EC tablet Take 1 tablet by mouth 2 times daily 60 tablet 0    mirtazapine (REMERON) 30 MG tablet Take 30 mg by mouth nightly        famotidine (PEPCID) 20 MG tablet Take 1 tablet by mouth 2 times daily 60 tablet 3    ARIPiprazole (ABILIFY) 10 MG tablet Take 10 mg by mouth daily        polyethylene glycol (GLYCOLAX) powder Take 17 g by mouth daily        levothyroxine (SYNTHROID) 112 MCG tablet Take 112 mcg by mouth Daily.        calcium carbonate 1250 MG/5ML SUSP suspension Take 1,250 mg by mouth 3 times daily.        diphenhydrAMINE (BENADRYL) 50 MG capsule Take 50 mg by mouth nightly.        acetaminophen (TYLENOL) 500 MG tablet Take 500 mg by mouth every 6 hours as needed for Fever.        docusate (COLACE) 50 MG/5ML liquid Take 100 mg by mouth 2 times daily.        fluticasone (FLONASE) 50 MCG/ACT nasal spray 1 spray by Nasal route daily.        lactulose (CHRONULAC) 10 GM/15ML solution Take 20 g by mouth 2 times daily.        multivitamin (THERAGRAN) per tablet Take 1 tablet by mouth daily.        tamsulosin (FLOMAX) 0.4 MG capsule Take 0.4 mg by mouth daily.        vitamin D (ERGOCALCIFEROL) 08674 UNITS CAPS capsule Take 50,000 Units by mouth once a week.                  Allergies   Allergen Reactions    No Known Allergies           Nursing Notes Reviewed     ROS:  Unable to obtain from patient due to baseline nonverbal status     Physical Exam:      ED Triage Vitals [04/03/19 0202]   Enc Vitals Group      /80      Pulse (!) 47      Resp 14      Temp 97.3 °F (36.3 °C)      Temp Source Temporal      SpO2 95 %      Weight 125 lb (56.7 kg)      Height 6' 2\" (1.88 m)      Head Circumference        Peak Flow        Pain Score        Pain Loc        Pain Edu?        Excl. in [address]?        My pulse oximetry interpretation is which is within the normal range     GENERAL APPEARANCE: Awake and alert. Cooperative. Well appearing. HEAD: Normocephalic. Contusion/abrasion to the left temporal scalp. 2 cm laceration to the left eyebrow  EYES: PERRL, normal conjunctiva  ENT: Mucous membranes are moist. Normal oropharynx  NECK: Supple. No midline cervical spinal tenderness to palpation  HEART: bradycardic regular rhythm. LUNGS: Respirations unlabored without retractions. CTAB. ABDOMEN: Soft. Non-tender, nondistended. No guarding or rebound. EXTREMITIES: No acute deformities. No swelling or edema  SKIN: Warm and dry. NEUROLOGICAL: Alert at baseline mental status. Nonverbal.  Moves all extremities equally with good strength.   No facial drooping noted  PSYCHIATRIC: Normal mood.     I have reviewed and interpreted all of the currently available lab results from this visit (if applicable):        Results for orders placed or performed during the hospital encounter of 04/03/19   CBC Auto Differential   Result Value Ref Range     WBC 5.8 4.0 - 11.0 K/uL     RBC 4.58 4.20 - 5.90 M/uL     Hemoglobin 13.8 13.5 - 17.5 g/dL     Hematocrit 41.6 40.5 - 52.5 %     MCV 90.9 80.0 - 100.0 fL     MCH 30.2 26.0 - 34.0 pg     MCHC 33.2 31.0 - 36.0 g/dL     RDW 18.1 (H) 12.4 - 15.4 %     Platelets 82 (L) 205 - 450 K/uL     MPV 10.9 (H) 5.0 - 10.5 fL     PLATELET SLIDE REVIEW Decreased       SLIDE REVIEW see below       Neutrophils % 86.1 %     Lymphocytes % 9.8 %     Monocytes % 2.9 %     Eosinophils % 0.8 %     Basophils % 0.4 %     Neutrophils # 5.0 1.7 - 7.7 K/uL     Lymphocytes # 0.6 (L) 1.0 - 5.1 K/uL     Monocytes # 0.2 0.0 - 1.3 K/uL     Eosinophils # 0.0 0.0 - 0.6 K/uL     Basophils # 0.0 0.0 - 0.2 consent was given for the procedure. The area was prepped and draped in standard bedside fashion. The wound area was anesthetized with 2ml of Lidocaine 1% with epinephrine without added sodium bicarbonate. The wound was explored with No foreign bodies found. The wound was repaired with 4-0 Ethilon; 3 simple interrupted sutures were used. The patient tolerated the procedure well without complications and my repeat neurovascular exam post-procedure is unchanged. Wound care and scar minimization education was provided. Instructions were given to return for increasing pain, redness, streaking, discharge, or any other worsening or worrisome concerns.        MDM:  40-year-old male who presents after fall with head injury. Consider skull fracture, intracranial hemorrhage, laceration. Patient has a nonfocal neuro exam is at his baseline mental status. CT head shows acute subdural hematoma 2 mm. Discussed with Dr. Fredy Corcoran of neurosurgery who feels this likely represents a large blood vessel but does agree with repeat 6 hour CT. The facial laceration was repaired. Tetanus was updated. If CT is unchanged patient can be discharged home with caregiver. CT is pending at time of transfer care to Dr. Magdalena Joel.     Clinical Impression:  1. Injury of head, initial encounter    2. Facial laceration, initial encounter             Disposition referral (if applicable):  Iam Ayoub     Schedule an appointment as soon as possible for a visit in 3 days  To recheck today's complaints. . Suture removal in 5-7 days  [place] [address] Emergency Department  [address] [place] [address]  [phone]     As needed, If symptoms worsen        Disposition medications (if applicable):      New Prescriptions     No medications on file            Comment: Please note this report has been produced using speech recognition software and may contain errors related to that system including errors in grammar, punctuation, and

## 2019-04-06 NOTE — ED NOTES
Pt discharged in stable condition, VSS, no signs of distress. Discharge instructions and meds reviewed. Caregiver verbalizes understanding and states no further questions or concerns unaddressed.        Redmond Essex, RN  04/06/19 1236

## 2019-04-12 ENCOUNTER — APPOINTMENT (OUTPATIENT)
Dept: GENERAL RADIOLOGY | Age: 49
End: 2019-04-12
Payer: MEDICARE

## 2019-04-12 ENCOUNTER — HOSPITAL ENCOUNTER (EMERGENCY)
Age: 49
Discharge: ANOTHER HOSPITAL | End: 2019-04-12
Attending: EMERGENCY MEDICINE
Payer: MEDICARE

## 2019-04-12 ENCOUNTER — APPOINTMENT (OUTPATIENT)
Dept: CT IMAGING | Age: 49
End: 2019-04-12
Payer: MEDICARE

## 2019-04-12 VITALS
HEART RATE: 76 BPM | HEIGHT: 68 IN | RESPIRATION RATE: 18 BRPM | TEMPERATURE: 98 F | BODY MASS INDEX: 17.24 KG/M2 | SYSTOLIC BLOOD PRESSURE: 106 MMHG | OXYGEN SATURATION: 98 % | DIASTOLIC BLOOD PRESSURE: 70 MMHG | WEIGHT: 113.76 LBS

## 2019-04-12 DIAGNOSIS — R45.1 AGITATION: ICD-10-CM

## 2019-04-12 DIAGNOSIS — S06.5XAA SUBDURAL HEMATOMA: ICD-10-CM

## 2019-04-12 DIAGNOSIS — R41.82 ALTERED MENTAL STATUS, UNSPECIFIED ALTERED MENTAL STATUS TYPE: Primary | ICD-10-CM

## 2019-04-12 LAB
A/G RATIO: 0.9 (ref 1.1–2.2)
ALBUMIN SERPL-MCNC: 3.5 G/DL (ref 3.4–5)
ALP BLD-CCNC: 132 U/L (ref 40–129)
ALT SERPL-CCNC: 67 U/L (ref 10–40)
ANION GAP SERPL CALCULATED.3IONS-SCNC: 8 MMOL/L (ref 3–16)
APTT: 43.4 SEC (ref 26–36)
AST SERPL-CCNC: 58 U/L (ref 15–37)
BASOPHILS ABSOLUTE: 0.1 K/UL (ref 0–0.2)
BASOPHILS RELATIVE PERCENT: 1.1 %
BILIRUB SERPL-MCNC: <0.2 MG/DL (ref 0–1)
BILIRUBIN URINE: ABNORMAL
BLOOD, URINE: ABNORMAL
BUN BLDV-MCNC: 48 MG/DL (ref 7–20)
CALCIUM SERPL-MCNC: 9.4 MG/DL (ref 8.3–10.6)
CARBAMAZEPINE DOSE: ABNORMAL
CARBAMAZEPINE LEVEL: 3.8 UG/ML (ref 4–12)
CHLORIDE BLD-SCNC: 99 MMOL/L (ref 99–110)
CLARITY: ABNORMAL
CO2: 32 MMOL/L (ref 21–32)
COLOR: ABNORMAL
COMMENT UA: ABNORMAL
CREAT SERPL-MCNC: 1.4 MG/DL (ref 0.9–1.3)
EOSINOPHILS ABSOLUTE: 0 K/UL (ref 0–0.6)
EOSINOPHILS RELATIVE PERCENT: 0.8 %
EPITHELIAL CELLS, UA: 5 /HPF (ref 0–5)
GFR AFRICAN AMERICAN: >60
GFR NON-AFRICAN AMERICAN: 53
GLOBULIN: 4 G/DL
GLUCOSE BLD-MCNC: 78 MG/DL (ref 70–99)
GLUCOSE URINE: NEGATIVE MG/DL
HCT VFR BLD CALC: 38.2 % (ref 40.5–52.5)
HEMOGLOBIN: 12.9 G/DL (ref 13.5–17.5)
HYALINE CASTS: 7 /LPF (ref 0–8)
INR BLD: 1.05 (ref 0.86–1.14)
KEPPRA DOSE AMT: NORMAL
KEPPRA: 15 UG/ML (ref 6–46)
KETONES, URINE: ABNORMAL MG/DL
LACTIC ACID: 1.2 MMOL/L (ref 0.4–2)
LEUKOCYTE ESTERASE, URINE: NEGATIVE
LYMPHOCYTES ABSOLUTE: 0.6 K/UL (ref 1–5.1)
LYMPHOCYTES RELATIVE PERCENT: 11.6 %
MCH RBC QN AUTO: 30.9 PG (ref 26–34)
MCHC RBC AUTO-ENTMCNC: 33.7 G/DL (ref 31–36)
MCV RBC AUTO: 91.8 FL (ref 80–100)
MICROSCOPIC EXAMINATION: YES
MONOCYTES ABSOLUTE: 0.2 K/UL (ref 0–1.3)
MONOCYTES RELATIVE PERCENT: 3.8 %
NEUTROPHILS ABSOLUTE: 4.3 K/UL (ref 1.7–7.7)
NEUTROPHILS RELATIVE PERCENT: 82.7 %
NITRITE, URINE: NEGATIVE
PDW BLD-RTO: 19 % (ref 12.4–15.4)
PH UA: 5.5 (ref 5–8)
PLATELET # BLD: 95 K/UL (ref 135–450)
PLATELET SLIDE REVIEW: ABNORMAL
PMV BLD AUTO: 11.7 FL (ref 5–10.5)
POTASSIUM REFLEX MAGNESIUM: 4.6 MMOL/L (ref 3.5–5.1)
PROTEIN UA: NEGATIVE MG/DL
PROTHROMBIN TIME: 12 SEC (ref 9.8–13)
RBC # BLD: 4.16 M/UL (ref 4.2–5.9)
RBC UA: 18 /HPF (ref 0–4)
SODIUM BLD-SCNC: 139 MMOL/L (ref 136–145)
SPECIFIC GRAVITY UA: 1.02 (ref 1–1.03)
T4 FREE: 1.1 NG/DL (ref 0.9–1.8)
TOTAL PROTEIN: 7.5 G/DL (ref 6.4–8.2)
TSH REFLEX: 6.54 UIU/ML (ref 0.27–4.2)
URINE REFLEX TO CULTURE: YES
URINE TYPE: ABNORMAL
UROBILINOGEN, URINE: 0.2 E.U./DL
VALPROIC ACID LEVEL: 27.6 UG/ML (ref 50–100)
WBC # BLD: 5.2 K/UL (ref 4–11)
WBC UA: 6 /HPF (ref 0–5)

## 2019-04-12 PROCEDURE — 96375 TX/PRO/DX INJ NEW DRUG ADDON: CPT

## 2019-04-12 PROCEDURE — 99291 CRITICAL CARE FIRST HOUR: CPT

## 2019-04-12 PROCEDURE — 85025 COMPLETE CBC W/AUTO DIFF WBC: CPT

## 2019-04-12 PROCEDURE — 84443 ASSAY THYROID STIM HORMONE: CPT

## 2019-04-12 PROCEDURE — 36415 COLL VENOUS BLD VENIPUNCTURE: CPT

## 2019-04-12 PROCEDURE — 71045 X-RAY EXAM CHEST 1 VIEW: CPT

## 2019-04-12 PROCEDURE — 83605 ASSAY OF LACTIC ACID: CPT

## 2019-04-12 PROCEDURE — 80156 ASSAY CARBAMAZEPINE TOTAL: CPT

## 2019-04-12 PROCEDURE — 80177 DRUG SCRN QUAN LEVETIRACETAM: CPT

## 2019-04-12 PROCEDURE — 6360000002 HC RX W HCPCS: Performed by: PHYSICIAN ASSISTANT

## 2019-04-12 PROCEDURE — 81001 URINALYSIS AUTO W/SCOPE: CPT

## 2019-04-12 PROCEDURE — 80053 COMPREHEN METABOLIC PANEL: CPT

## 2019-04-12 PROCEDURE — 87801 DETECT AGNT MULT DNA AMPLI: CPT

## 2019-04-12 PROCEDURE — 96374 THER/PROPH/DIAG INJ IV PUSH: CPT

## 2019-04-12 PROCEDURE — 87040 BLOOD CULTURE FOR BACTERIA: CPT

## 2019-04-12 PROCEDURE — 84439 ASSAY OF FREE THYROXINE: CPT

## 2019-04-12 PROCEDURE — 70450 CT HEAD/BRAIN W/O DYE: CPT

## 2019-04-12 PROCEDURE — 71046 X-RAY EXAM CHEST 2 VIEWS: CPT

## 2019-04-12 PROCEDURE — 85610 PROTHROMBIN TIME: CPT

## 2019-04-12 PROCEDURE — 85730 THROMBOPLASTIN TIME PARTIAL: CPT

## 2019-04-12 PROCEDURE — 87086 URINE CULTURE/COLONY COUNT: CPT

## 2019-04-12 PROCEDURE — 80164 ASSAY DIPROPYLACETIC ACD TOT: CPT

## 2019-04-12 PROCEDURE — 96376 TX/PRO/DX INJ SAME DRUG ADON: CPT

## 2019-04-12 RX ORDER — HALOPERIDOL 5 MG/ML
2 INJECTION INTRAMUSCULAR ONCE
Status: COMPLETED | OUTPATIENT
Start: 2019-04-12 | End: 2019-04-12

## 2019-04-12 RX ORDER — POLYETHYLENE GLYCOL 3350 17 G/17G
17 POWDER, FOR SOLUTION ORAL DAILY
COMMUNITY

## 2019-04-12 RX ORDER — MIRTAZAPINE 30 MG/1
30 TABLET, FILM COATED ORAL NIGHTLY
COMMUNITY

## 2019-04-12 RX ORDER — CLONAZEPAM 1 MG/1
1 TABLET ORAL NIGHTLY
Status: ON HOLD | COMMUNITY
End: 2019-04-22 | Stop reason: SDUPTHER

## 2019-04-12 RX ORDER — CLONAZEPAM 1 MG/1
0.5 TABLET ORAL 2 TIMES DAILY
Status: ON HOLD | COMMUNITY
End: 2019-04-22 | Stop reason: SDUPTHER

## 2019-04-12 RX ORDER — DIVALPROEX SODIUM 125 MG/1
500 CAPSULE, COATED PELLETS ORAL NIGHTLY
COMMUNITY

## 2019-04-12 RX ORDER — ACETAMINOPHEN 500 MG
500 TABLET ORAL EVERY 6 HOURS PRN
COMMUNITY

## 2019-04-12 RX ORDER — DIVALPROEX SODIUM 125 MG/1
250 CAPSULE, COATED PELLETS ORAL DAILY
COMMUNITY

## 2019-04-12 RX ORDER — LEVETIRACETAM 500 MG/1
500 TABLET ORAL 2 TIMES DAILY
COMMUNITY

## 2019-04-12 RX ORDER — TAMSULOSIN HYDROCHLORIDE 0.4 MG/1
0.4 CAPSULE ORAL DAILY
COMMUNITY

## 2019-04-12 RX ORDER — LORAZEPAM 2 MG/ML
0.5 INJECTION INTRAMUSCULAR ONCE
Status: COMPLETED | OUTPATIENT
Start: 2019-04-12 | End: 2019-04-12

## 2019-04-12 RX ORDER — CHOLECALCIFEROL (VITAMIN D3) 1250 MCG
50000 CAPSULE ORAL WEEKLY
Status: ON HOLD | COMMUNITY
End: 2019-04-22 | Stop reason: HOSPADM

## 2019-04-12 RX ORDER — LACTULOSE 10 G/15ML
20 SOLUTION ORAL 2 TIMES DAILY
COMMUNITY

## 2019-04-12 RX ORDER — ARIPIPRAZOLE 20 MG/1
20 TABLET ORAL NIGHTLY
COMMUNITY

## 2019-04-12 RX ORDER — CARBAMAZEPINE 200 MG/1
200 TABLET ORAL 2 TIMES DAILY
COMMUNITY

## 2019-04-12 RX ORDER — CALCIUM CARBONATE 1250 MG/5ML
1250 SUSPENSION ORAL 3 TIMES DAILY
Status: ON HOLD | COMMUNITY
End: 2019-04-22 | Stop reason: HOSPADM

## 2019-04-12 RX ORDER — LORAZEPAM 2 MG/ML
1 INJECTION INTRAMUSCULAR ONCE
Status: COMPLETED | OUTPATIENT
Start: 2019-04-12 | End: 2019-04-12

## 2019-04-12 RX ORDER — FLUTICASONE PROPIONATE 50 MCG
1 SPRAY, SUSPENSION (ML) NASAL DAILY
COMMUNITY

## 2019-04-12 RX ORDER — M-VIT,TX,IRON,MINS/CALC/FOLIC 27MG-0.4MG
1 TABLET ORAL DAILY
Status: ON HOLD | COMMUNITY
End: 2019-04-22 | Stop reason: HOSPADM

## 2019-04-12 RX ORDER — LEVOTHYROXINE SODIUM 112 UG/1
112 TABLET ORAL DAILY
Status: ON HOLD | COMMUNITY
End: 2019-04-19 | Stop reason: SDUPTHER

## 2019-04-12 RX ORDER — IBUPROFEN 600 MG/1
600 TABLET ORAL EVERY 8 HOURS PRN
Status: ON HOLD | COMMUNITY
End: 2019-04-22 | Stop reason: HOSPADM

## 2019-04-12 RX ADMIN — LORAZEPAM 1 MG: 2 INJECTION INTRAMUSCULAR; INTRAVENOUS at 18:35

## 2019-04-12 RX ADMIN — HALOPERIDOL LACTATE 2 MG: 5 INJECTION INTRAMUSCULAR at 17:25

## 2019-04-12 RX ADMIN — LORAZEPAM 0.5 MG: 2 INJECTION INTRAMUSCULAR; INTRAVENOUS at 14:53

## 2019-04-12 RX ADMIN — LORAZEPAM 0.5 MG: 2 INJECTION INTRAMUSCULAR; INTRAVENOUS at 15:14

## 2019-04-12 ASSESSMENT — PAIN SCALES - GENERAL
PAINLEVEL_OUTOF10: 0

## 2019-04-12 ASSESSMENT — ENCOUNTER SYMPTOMS
NAUSEA: 0
VOMITING: 0
COUGH: 0
TROUBLE SWALLOWING: 1

## 2019-04-12 NOTE — ED PROVIDER NOTES
Diagnosis Date    BPH (benign prostatic hyperplasia)     per caregiver    Developmental delay     Dysphagia     per caregiver    GERD (gastroesophageal reflux disease)     per caregiver    Hypertension     per caregiver    Hypothyroid     per caregiver    Meningitis 2014    Mental retardation     Nonverbal     per caregiver    Schizophrenia [facility])     per caregiver    Seizure disorder [facility])     per caregiver         SURGICAL HISTORY   No past surgical history on file. [address]       Discharge Medication List as of 4/12/2019 11:43 PM      CONTINUE these medications which have NOT CHANGED    Details   ARIPiprazole (ABILIFY) 20 MG tablet Take 20 mg by mouth nightlyHistorical Med      calcium carbonate 1250 MG/5ML SUSP suspension Take 1,250 mg by mouth 3 times dailyHistorical Med      carBAMazepine (TEGRETOL) 200 MG tablet Take 200 mg by mouth 2 times dailyHistorical Med      !! clonazePAM (KLONOPIN) 1 MG tablet Take 0.5 mg by mouth 2 times daily. 0800/1600Historical Med      !! clonazePAM (KLONOPIN) 1 MG tablet Take 1 mg by mouth nightly. Historical Med      !! divalproex (DEPAKOTE SPRINKLES) 125 MG capsule Take 250 mg by mouth dailyHistorical Med      !! divalproex (DEPAKOTE SPRINKLES) 125 MG capsule Take 500 mg by mouth nightlyHistorical Med      fluticasone (FLONASE) 50 MCG/ACT nasal spray 1 spray by Each Nare route dailyHistorical Med      lactulose (CHRONULAC) 10 GM/15ML solution Take 20 g by mouth 2 times dailyHistorical Med      levETIRAcetam (KEPPRA) 500 MG tablet Take 500 mg by mouth 2 times dailyHistorical Med      levothyroxine (SYNTHROID) 112 MCG tablet Take 112 mcg by mouth DailyHistorical Med      mirtazapine (REMERON) 30 MG tablet Take 30 mg by mouth nightlyHistorical Med      Multiple Vitamins-Minerals (THERAPEUTIC MULTIVITAMIN-MINERALS) tablet Take 1 tablet by mouth dailyHistorical Med      polyethylene glycol (GLYCOLAX) packet Take 17 g by mouth dailyHistorical Med tamsulosin (FLOMAX) 0.4 MG capsule Take 0.4 mg by mouth dailyHistorical Med      Cholecalciferol (VITAMIN D3) 67030 units CAPS Take 50,000 Units by mouth once a week SaturdayHistorical Med      acetaminophen (TYLENOL) 500 MG tablet Take 500 mg by mouth every 6 hours as needed for PainHistorical Med      ibuprofen (ADVIL;MOTRIN) 600 MG tablet Take 600 mg by mouth every 8 hours as needed for PainHistorical Med       !! - Potential duplicate medications found. Please discuss with provider. ALLERGIES     Patient has no known allergies. FAMILYHISTORY     No family history on file.        SOCIAL HISTORY       Social History     Socioeconomic History    Marital status: Single     Spouse name: Not on file    Number of children: Not on file    Years of education: Not on file    Highest education level: Not on file   Occupational History    Not on file   Social Needs    Financial resource strain: Not on file    Food insecurity:     Worry: Not on file     Inability: Not on file    Transportation needs:     Medical: Not on file     Non-medical: Not on file   Tobacco Use    Smoking status: Not on file   Substance and Sexual Activity    Alcohol use: Not on file    Drug use: Not on file    Sexual activity: Not on file   Lifestyle    Physical activity:     Days per week: Not on file     Minutes per session: Not on file    Stress: Not on file   Relationships    Social connections:     Talks on phone: Not on file     Gets together: Not on file     Attends Denominational service: Not on file     Active member of club or organization: Not on file     Attends meetings of clubs or organizations: Not on file     Relationship status: Not on file    Intimate partner violence:     Fear of current or ex partner: Not on file     Emotionally abused: Not on file     Physically abused: Not on file     Forced sexual activity: Not on file   Other Topics Concern    Not on file   Social History Narrative    Not on file SCREENINGS             PHYSICAL EXAM    (up to 7 for level 4, 8 or more for level 5)     ED Triage Vitals [04/12/19 1404]   BP Temp Temp Source Pulse Resp SpO2 Height Weight   103/76 97.5 °F (36.4 °C) Axillary 77 16 95 % -- 113 lb 12.1 oz (51.6 kg)       Physical Exam   Constitutional: He appears cachectic. HENT:   Head: Normocephalic. Multiple superficial abrasions noted, sutures in left eyebrow from previous laceration   Eyes: Pupils are equal, round, and reactive to light. Conjunctivae are normal.   Neck: Normal range of motion. Neck supple. Cardiovascular: Normal rate and regular rhythm. Pulmonary/Chest: Effort normal and breath sounds normal. No stridor. No respiratory distress. He has no wheezes. Abdominal: Soft. Musculoskeletal:   Moving all 4 extremities without difficulty   Skin: Skin is warm. Multiple superficial abrasion to shins, face, scalp and arms       DIAGNOSTIC RESULTS   LABS:    Labs Reviewed   CULTURE BLOOD #2 - Abnormal; Notable for the following components:       Result Value    Culture, Blood 2   (*)     Value: Organism identification not detected by PCR (Film Array)  See additional report for complete BCID panel      Organism Staphylococcus coagulase-negative (*)     All other components within normal limits    Narrative:     ORDER#: [id number]                          ORDERED BY: EUGENE LAKHANI  SOURCE: Blood Antecubital-Left             COLLECTED:  04/12/19 16:08  ANTIBIOTICS AT NELDA.:                      RECEIVED :  04/12/19 16:25  CALL  Hannah Akers tel. [phone],  Microbiology results called to and read back by Aixa Fuller, ED charge nurse,  04/14/2019 10:55, by Zaina Michele  If child <=2 yrs old please draw pediatric bottle. ~Blood Culture #2  Performed at:  [place] [address] [place] [address]   Phone [phone]   CBC WITH AUTO DIFFERENTIAL - Abnormal; Notable for the following components:    RBC 4.16 (*) Hemoglobin 12.9 (*)     Hematocrit 38.2 (*)     RDW 19.0 (*)     Platelets 95 (*)     MPV 11.7 (*)     Lymphocytes # 0.6 (*)     All other components within normal limits    Narrative:     Performed at:  [address] [facility]   Phone [phone]   COMPREHENSIVE METABOLIC PANEL W/ REFLEX TO MG FOR LOW K - Abnormal; Notable for the following components:    BUN 48 (*)     CREATININE 1.4 (*)     GFR Non-African American 53 (*)     Albumin/Globulin Ratio 0.9 (*)     Alkaline Phosphatase 132 (*)     ALT 67 (*)     AST 58 (*)     All other components within normal limits    Narrative:     Performed at:  [address] [place] [address]   Phone [phone]   URINE RT REFLEX TO CULTURE - Abnormal; Notable for the following components:    Clarity, UA CLOUDY (*)     Bilirubin Urine SMALL (*)     Ketones, Urine TRACE (*)     Blood, Urine SMALL (*)     All other components within normal limits    Narrative:     Performed at:  [address] [facility] [address]   Phone [phone]   TSH WITH REFLEX - Abnormal; Notable for the following components:    TSH 6.54 (*)     All other components within normal limits    Narrative:     Performed at:  [address] [place]   Phone [phone]   VALPROIC ACID LEVEL, TOTAL - Abnormal; Notable for the following components:    Valproic Acid Lvl 27.6 (*)     All other components within normal limits    Narrative:     Performed at:  [address] [place]   Phone [phone]   CARBAMAZEPINE LEVEL, TOTAL - Abnormal; Notable for the following components:    Carbamazepine Lvl 3.8 (*)     All other components within normal limits    Narrative:     Performed at:  [facility] mg (1 mg Intravenous Given 4/12/19 3151)       He presents ED with HPI noted above. He is hemodynamically stable, afebrile and nontoxic appearing. Physical exam as above. Patient is cachectic. He has multiple abrasions to his face and head. CT head without contrast ordered. Unable to obtain CT at this time given patient movement and slight agitation. Patient given total of 1 mg IV Ativan and had mild calming. Patient taken for CT and still unable to get given movement. Patient given 2mg IV Haldol and still slightly agitated. Given additional 1 mg IV Ativan. Pending medication effect and CT my shift ended. Please see Dr. Lizet Corona note regarding final disposition. Their workup as above. Urine showed no acute infection. Chest x-ray showed no acute process. The CBC showed no leukocytosis, mild anemia with hemoglobin 12.9. CMP showed mild renal impairment with a  GFR of 53 and creatinine 1.4, no significant electrolyte abnormality. Lactic acid within normal limits. Depakote and carbamazepine level subtherapeutic. Again no noted seizures. FINAL IMPRESSION      1. Altered mental status, unspecified altered mental status type    2. Subdural hematoma (HCC)    3. Agitation          DISPOSITION/PLAN   DISPOSITION        PATIENT REFERREDTO:  No follow-up provider specified.     DISCHARGE MEDICATIONS:  Discharge Medication List as of 4/12/2019 11:43 PM          DISCONTINUED MEDICATIONS:  Discharge Medication List as of 4/12/2019 11:43 PM                 (Please note that portions ofthis note were completed with a voice recognition program.  Efforts were made to edit the dictations but occasionally words are mis-transcribed.)    Adilene Ellis PA-C (electronically signed)           Adilene Ellis PA-C  04/12/19 61 Pal Lakhani PA-C  04/17/19 1010

## 2019-04-12 NOTE — ED TRIAGE NOTES
Patient brought in by EMS with c/o altered mental status. Patient was at his day program when the workers there became concerned that he was not as alert as he usually is.

## 2019-04-12 NOTE — ED NOTES
Patient brought in by EMS from day program. Pt is MRDD and care providers at the day programed says \"patient seems lethargic and is not acting like him self\". They c/o that patient has not been eating for the past week and he normally eats well. There is some concern that patient has new injuries to his body when he arrives to day care that occurred since Wednesday when she last saw him. Patient reportedly is known to throw himself out of bed. Vital signs stable.       Elise Ahn RN  04/12/19 4419

## 2019-04-12 NOTE — ED NOTES
Fall risk screening completed. Fall risk bracelet applied to patient. Non-skid socks provided and placed on patient. The call light is within the patient's reach, and instructions for use were provided to bedside caregivers. The bed is in the lowest position with wheels locked. The patient is unable to notify staff, using the call light, if there is a need to get up or use restroom, but caregivers received instructions. The patient is unable to verbalize understanding of safety precautions and how to contact staff for assistance, but caregivers have been made aware. Pts home caregivers verbalized understanding.       Rachele Winston RN  04/12/19 5780

## 2019-04-12 NOTE — ED PROVIDER NOTES
I independently evaluated and obtained a history and physical on Herbie Pelaez. All diagnostic, treatment, and disposition assistants were made to myself in conjunction the advanced practice provider. For further details of this patient's emergency department encounter, please see the advanced practice provider's documentation. History: Pt presents with increasing agitation. He has MRDD, but has had a significant change in mental status with bruises on the extremities. He does throw himself to the ground frequently with his agitation. No new medications or changes. Physician Exam: Patient is slightly agitated and frequently moves back and forth and sits up and down in the bed. He has scattered bruises and abrasions on the extremities. He has a healing laceration above the left eyebrow with 3 sutures in place. He does move all extremities without difficulty. Lungs are clear to auscultation. Regular heart rate and rhythm. Patient may have occult infection versus possible intracranial head injury causing his agitation as he does recall he threw himself to the ground or against objects. Labs, urinalysis, chest x-ray, and CT of the head ordered. Disposition pending workup. Patient was found to have a new traumatic subdural hematoma on the left with some midline shift. He will need to be evaluated by neurosurgery. He has been accepted by  Dr. Dana [facility]ona at [facility]    Lenny Soler 124 time was at least 35 minutes, excluding separately reportable procedures. There was a high probability of clinically significant/life threatening deterioration in the patient's condition which required my urgent intervention. 1. Altered mental status, unspecified altered mental status type    2. Subdural hematoma (HCC)    3.  Agitation            (Please note that portions of this note may have been completed with a voice recognition program. Efforts were made to edit the dictations but occasionally words are mis-transcribed.)        Vijaya Delgado MD  04/13/19 0104

## 2019-04-12 NOTE — ED NOTES
Pharmacy Medication Reconciliation Note     List of medications patient is currently taking is complete. Allergies:  Patient has no allergy information on record. Source of information:   Medication list  2. caregivers    Notes regarding home medications:   1.  Patient received AM medications prior to arrival to his day program.     Denies taking any other OTC or herbal medications    Livingston Gottron, PharmD, BCPS  4/12/2019  5:33 PM

## 2019-04-13 ENCOUNTER — APPOINTMENT (OUTPATIENT)
Dept: CT IMAGING | Age: 49
DRG: 082 | End: 2019-04-13
Attending: FAMILY MEDICINE
Payer: MEDICARE

## 2019-04-13 ENCOUNTER — HOSPITAL ENCOUNTER (INPATIENT)
Age: 49
LOS: 6 days | Discharge: HOSPICE | DRG: 082 | End: 2019-04-19
Attending: FAMILY MEDICINE | Admitting: FAMILY MEDICINE
Payer: MEDICARE

## 2019-04-13 PROBLEM — E43 SEVERE MALNUTRITION (HCC): Chronic | Status: ACTIVE | Noted: 2019-04-13

## 2019-04-13 LAB
ABO/RH: NORMAL
ANION GAP SERPL CALCULATED.3IONS-SCNC: 13 MMOL/L (ref 3–16)
ANTIBODY SCREEN: NORMAL
BASOPHILS ABSOLUTE: 0 K/UL (ref 0–0.2)
BASOPHILS RELATIVE PERCENT: 0.4 %
BLOOD BANK DISPENSE STATUS: NORMAL
BLOOD BANK PRODUCT CODE: NORMAL
BPU ID: NORMAL
BUN BLDV-MCNC: 53 MG/DL (ref 7–20)
CALCIUM SERPL-MCNC: 9.4 MG/DL (ref 8.3–10.6)
CHLORIDE BLD-SCNC: 103 MMOL/L (ref 99–110)
CO2: 29 MMOL/L (ref 21–32)
CREAT SERPL-MCNC: 1.1 MG/DL (ref 0.9–1.3)
DESCRIPTION BLOOD BANK: NORMAL
EKG ATRIAL RATE: 54 BPM
EKG DIAGNOSIS: NORMAL
EKG P AXIS: 72 DEGREES
EKG P-R INTERVAL: 138 MS
EKG Q-T INTERVAL: 442 MS
EKG QRS DURATION: 82 MS
EKG QTC CALCULATION (BAZETT): 419 MS
EKG R AXIS: 62 DEGREES
EKG T AXIS: 80 DEGREES
EKG VENTRICULAR RATE: 54 BPM
EOSINOPHILS ABSOLUTE: 0 K/UL (ref 0–0.6)
EOSINOPHILS RELATIVE PERCENT: 0.2 %
GFR AFRICAN AMERICAN: >60
GFR NON-AFRICAN AMERICAN: >60
GLUCOSE BLD-MCNC: 111 MG/DL (ref 70–99)
GLUCOSE BLD-MCNC: 53 MG/DL (ref 70–99)
GLUCOSE BLD-MCNC: 54 MG/DL (ref 70–99)
GLUCOSE BLD-MCNC: 54 MG/DL (ref 70–99)
GLUCOSE BLD-MCNC: 61 MG/DL (ref 70–99)
GLUCOSE BLD-MCNC: 70 MG/DL (ref 70–99)
GLUCOSE BLD-MCNC: 80 MG/DL (ref 70–99)
HCT VFR BLD CALC: 36.4 % (ref 40.5–52.5)
HEMOGLOBIN: 12.2 G/DL (ref 13.5–17.5)
LYMPHOCYTES ABSOLUTE: 0.4 K/UL (ref 1–5.1)
LYMPHOCYTES RELATIVE PERCENT: 5 %
MCH RBC QN AUTO: 30.5 PG (ref 26–34)
MCHC RBC AUTO-ENTMCNC: 33.6 G/DL (ref 31–36)
MCV RBC AUTO: 90.7 FL (ref 80–100)
MONOCYTES ABSOLUTE: 0.4 K/UL (ref 0–1.3)
MONOCYTES RELATIVE PERCENT: 4 %
NEUTROPHILS ABSOLUTE: 7.9 K/UL (ref 1.7–7.7)
NEUTROPHILS RELATIVE PERCENT: 90.4 %
PDW BLD-RTO: 18.3 % (ref 12.4–15.4)
PERFORMED ON: ABNORMAL
PERFORMED ON: NORMAL
PERFORMED ON: NORMAL
PLATELET # BLD: 56 K/UL (ref 135–450)
PLATELET SLIDE REVIEW: ABNORMAL
PMV BLD AUTO: 11 FL (ref 5–10.5)
POTASSIUM SERPL-SCNC: 4.6 MMOL/L (ref 3.5–5.1)
RBC # BLD: 4.01 M/UL (ref 4.2–5.9)
SODIUM BLD-SCNC: 145 MMOL/L (ref 136–145)
WBC # BLD: 8.7 K/UL (ref 4–11)

## 2019-04-13 PROCEDURE — 86900 BLOOD TYPING SEROLOGIC ABO: CPT

## 2019-04-13 PROCEDURE — 2500000003 HC RX 250 WO HCPCS: Performed by: STUDENT IN AN ORGANIZED HEALTH CARE EDUCATION/TRAINING PROGRAM

## 2019-04-13 PROCEDURE — 51798 US URINE CAPACITY MEASURE: CPT

## 2019-04-13 PROCEDURE — 6360000002 HC RX W HCPCS: Performed by: STUDENT IN AN ORGANIZED HEALTH CARE EDUCATION/TRAINING PROGRAM

## 2019-04-13 PROCEDURE — 2580000003 HC RX 258: Performed by: STUDENT IN AN ORGANIZED HEALTH CARE EDUCATION/TRAINING PROGRAM

## 2019-04-13 PROCEDURE — 6370000000 HC RX 637 (ALT 250 FOR IP): Performed by: STUDENT IN AN ORGANIZED HEALTH CARE EDUCATION/TRAINING PROGRAM

## 2019-04-13 PROCEDURE — 36430 TRANSFUSION BLD/BLD COMPNT: CPT

## 2019-04-13 PROCEDURE — 70450 CT HEAD/BRAIN W/O DYE: CPT

## 2019-04-13 PROCEDURE — 86850 RBC ANTIBODY SCREEN: CPT

## 2019-04-13 PROCEDURE — 2000000000 HC ICU R&B

## 2019-04-13 PROCEDURE — 99233 SBSQ HOSP IP/OBS HIGH 50: CPT | Performed by: INTERNAL MEDICINE

## 2019-04-13 PROCEDURE — 93010 ELECTROCARDIOGRAM REPORT: CPT | Performed by: INTERNAL MEDICINE

## 2019-04-13 PROCEDURE — 93005 ELECTROCARDIOGRAM TRACING: CPT | Performed by: STUDENT IN AN ORGANIZED HEALTH CARE EDUCATION/TRAINING PROGRAM

## 2019-04-13 PROCEDURE — 85025 COMPLETE CBC W/AUTO DIFF WBC: CPT

## 2019-04-13 PROCEDURE — P9035 PLATELET PHERES LEUKOREDUCED: HCPCS

## 2019-04-13 PROCEDURE — 86901 BLOOD TYPING SEROLOGIC RH(D): CPT

## 2019-04-13 PROCEDURE — 36415 COLL VENOUS BLD VENIPUNCTURE: CPT

## 2019-04-13 PROCEDURE — 80048 BASIC METABOLIC PNL TOTAL CA: CPT

## 2019-04-13 RX ORDER — ACETAMINOPHEN 325 MG/1
650 TABLET ORAL EVERY 4 HOURS PRN
Status: DISCONTINUED | OUTPATIENT
Start: 2019-04-13 | End: 2019-04-17

## 2019-04-13 RX ORDER — DEXTROSE MONOHYDRATE 50 MG/ML
100 INJECTION, SOLUTION INTRAVENOUS PRN
Status: DISCONTINUED | OUTPATIENT
Start: 2019-04-13 | End: 2019-04-19 | Stop reason: HOSPADM

## 2019-04-13 RX ORDER — SODIUM CHLORIDE 0.9 % (FLUSH) 0.9 %
10 SYRINGE (ML) INJECTION PRN
Status: DISCONTINUED | OUTPATIENT
Start: 2019-04-13 | End: 2019-04-19 | Stop reason: HOSPADM

## 2019-04-13 RX ORDER — LEVOTHYROXINE SODIUM 112 UG/1
112 TABLET ORAL DAILY
Status: DISCONTINUED | OUTPATIENT
Start: 2019-04-13 | End: 2019-04-16

## 2019-04-13 RX ORDER — SODIUM CHLORIDE 0.9 % (FLUSH) 0.9 %
10 SYRINGE (ML) INJECTION EVERY 12 HOURS SCHEDULED
Status: DISCONTINUED | OUTPATIENT
Start: 2019-04-13 | End: 2019-04-19 | Stop reason: HOSPADM

## 2019-04-13 RX ORDER — ONDANSETRON 2 MG/ML
4 INJECTION INTRAMUSCULAR; INTRAVENOUS EVERY 6 HOURS PRN
Status: DISCONTINUED | OUTPATIENT
Start: 2019-04-13 | End: 2019-04-19 | Stop reason: HOSPADM

## 2019-04-13 RX ORDER — ARIPIPRAZOLE 5 MG/1
20 TABLET ORAL NIGHTLY
Status: DISCONTINUED | OUTPATIENT
Start: 2019-04-13 | End: 2019-04-19 | Stop reason: HOSPADM

## 2019-04-13 RX ORDER — LEVETIRACETAM 500 MG/1
500 TABLET ORAL 2 TIMES DAILY
Status: DISCONTINUED | OUTPATIENT
Start: 2019-04-13 | End: 2019-04-13

## 2019-04-13 RX ORDER — CARBAMAZEPINE 200 MG/1
200 TABLET ORAL 2 TIMES DAILY
Status: DISCONTINUED | OUTPATIENT
Start: 2019-04-13 | End: 2019-04-19 | Stop reason: HOSPADM

## 2019-04-13 RX ORDER — DEXTROSE MONOHYDRATE 25 G/50ML
12.5 INJECTION, SOLUTION INTRAVENOUS PRN
Status: DISCONTINUED | OUTPATIENT
Start: 2019-04-13 | End: 2019-04-13

## 2019-04-13 RX ORDER — 0.9 % SODIUM CHLORIDE 0.9 %
250 INTRAVENOUS SOLUTION INTRAVENOUS ONCE
Status: COMPLETED | OUTPATIENT
Start: 2019-04-13 | End: 2019-04-14

## 2019-04-13 RX ORDER — SODIUM CHLORIDE 9 MG/ML
INJECTION, SOLUTION INTRAVENOUS CONTINUOUS
Status: DISCONTINUED | OUTPATIENT
Start: 2019-04-13 | End: 2019-04-14

## 2019-04-13 RX ORDER — NICOTINE POLACRILEX 4 MG
15 LOZENGE BUCCAL PRN
Status: DISCONTINUED | OUTPATIENT
Start: 2019-04-13 | End: 2019-04-13 | Stop reason: SDUPTHER

## 2019-04-13 RX ORDER — LACTULOSE 10 G/15ML
20 SOLUTION ORAL 2 TIMES DAILY
Status: DISCONTINUED | OUTPATIENT
Start: 2019-04-13 | End: 2019-04-18

## 2019-04-13 RX ORDER — DIVALPROEX SODIUM 125 MG/1
500 CAPSULE, COATED PELLETS ORAL NIGHTLY
Status: DISCONTINUED | OUTPATIENT
Start: 2019-04-13 | End: 2019-04-13

## 2019-04-13 RX ORDER — DEXTROSE MONOHYDRATE 50 MG/ML
100 INJECTION, SOLUTION INTRAVENOUS PRN
Status: DISCONTINUED | OUTPATIENT
Start: 2019-04-13 | End: 2019-04-13

## 2019-04-13 RX ORDER — DIVALPROEX SODIUM 125 MG/1
250 CAPSULE, COATED PELLETS ORAL DAILY
Status: DISCONTINUED | OUTPATIENT
Start: 2019-04-13 | End: 2019-04-13

## 2019-04-13 RX ORDER — CLONAZEPAM 0.5 MG/1
0.5 TABLET ORAL 2 TIMES DAILY
Status: DISCONTINUED | OUTPATIENT
Start: 2019-04-13 | End: 2019-04-13

## 2019-04-13 RX ORDER — CLONAZEPAM 1 MG/1
1 TABLET ORAL NIGHTLY
Status: DISCONTINUED | OUTPATIENT
Start: 2019-04-13 | End: 2019-04-17

## 2019-04-13 RX ORDER — 0.9 % SODIUM CHLORIDE 0.9 %
500 INTRAVENOUS SOLUTION INTRAVENOUS ONCE
Status: COMPLETED | OUTPATIENT
Start: 2019-04-13 | End: 2019-04-13

## 2019-04-13 RX ORDER — DEXTROSE MONOHYDRATE 25 G/50ML
INJECTION, SOLUTION INTRAVENOUS
Status: DISPENSED
Start: 2019-04-13 | End: 2019-04-14

## 2019-04-13 RX ORDER — MIDAZOLAM HYDROCHLORIDE 1 MG/ML
2 INJECTION INTRAMUSCULAR; INTRAVENOUS ONCE
Status: COMPLETED | OUTPATIENT
Start: 2019-04-13 | End: 2019-04-13

## 2019-04-13 RX ORDER — MIDAZOLAM HYDROCHLORIDE 1 MG/ML
3 INJECTION INTRAMUSCULAR; INTRAVENOUS ONCE
Status: COMPLETED | OUTPATIENT
Start: 2019-04-13 | End: 2019-04-13

## 2019-04-13 RX ORDER — NICOTINE POLACRILEX 4 MG
15 LOZENGE BUCCAL PRN
Status: DISCONTINUED | OUTPATIENT
Start: 2019-04-13 | End: 2019-04-19 | Stop reason: HOSPADM

## 2019-04-13 RX ORDER — LABETALOL HYDROCHLORIDE 5 MG/ML
10 INJECTION, SOLUTION INTRAVENOUS EVERY 6 HOURS PRN
Status: DISCONTINUED | OUTPATIENT
Start: 2019-04-13 | End: 2019-04-19 | Stop reason: HOSPADM

## 2019-04-13 RX ORDER — TAMSULOSIN HYDROCHLORIDE 0.4 MG/1
0.4 CAPSULE ORAL DAILY
Status: DISCONTINUED | OUTPATIENT
Start: 2019-04-13 | End: 2019-04-19 | Stop reason: HOSPADM

## 2019-04-13 RX ORDER — MIRTAZAPINE 15 MG/1
30 TABLET, FILM COATED ORAL NIGHTLY
Status: DISCONTINUED | OUTPATIENT
Start: 2019-04-13 | End: 2019-04-19 | Stop reason: HOSPADM

## 2019-04-13 RX ORDER — DEXTROSE MONOHYDRATE 25 G/50ML
12.5 INJECTION, SOLUTION INTRAVENOUS PRN
Status: DISCONTINUED | OUTPATIENT
Start: 2019-04-13 | End: 2019-04-19 | Stop reason: HOSPADM

## 2019-04-13 RX ORDER — CLONAZEPAM 0.5 MG/1
0.5 TABLET ORAL 2 TIMES DAILY
Status: DISCONTINUED | OUTPATIENT
Start: 2019-04-13 | End: 2019-04-17

## 2019-04-13 RX ADMIN — SODIUM CHLORIDE: 9 INJECTION, SOLUTION INTRAVENOUS at 06:57

## 2019-04-13 RX ADMIN — LEVETIRACETAM 500 MG: 100 INJECTION, SOLUTION INTRAVENOUS at 21:04

## 2019-04-13 RX ADMIN — DEXTROSE 50 % IN WATER (D50W) INTRAVENOUS SYRINGE 12.5 G: at 23:03

## 2019-04-13 RX ADMIN — SODIUM CHLORIDE 500 ML: 9 INJECTION, SOLUTION INTRAVENOUS at 02:40

## 2019-04-13 RX ADMIN — SODIUM CHLORIDE: 9 INJECTION, SOLUTION INTRAVENOUS at 12:51

## 2019-04-13 RX ADMIN — VALPROATE SODIUM 190 MG: 100 INJECTION, SOLUTION INTRAVENOUS at 11:08

## 2019-04-13 RX ADMIN — VALPROATE SODIUM 190 MG: 100 INJECTION, SOLUTION INTRAVENOUS at 16:59

## 2019-04-13 RX ADMIN — MIDAZOLAM 2 MG: 1 INJECTION INTRAMUSCULAR; INTRAVENOUS at 02:02

## 2019-04-13 RX ADMIN — LEVETIRACETAM 500 MG: 100 INJECTION, SOLUTION INTRAVENOUS at 10:43

## 2019-04-13 RX ADMIN — SODIUM CHLORIDE 250 ML: 9 INJECTION, SOLUTION INTRAVENOUS at 14:47

## 2019-04-13 RX ADMIN — MIDAZOLAM HYDROCHLORIDE 3 MG: 1 INJECTION, SOLUTION INTRAMUSCULAR; INTRAVENOUS at 02:00

## 2019-04-13 RX ADMIN — Medication 15 G: at 22:44

## 2019-04-13 RX ADMIN — Medication 10 ML: at 21:05

## 2019-04-13 RX ADMIN — Medication 15 G: at 11:34

## 2019-04-13 RX ADMIN — VALPROATE SODIUM 190 MG: 100 INJECTION, SOLUTION INTRAVENOUS at 21:59

## 2019-04-13 RX ADMIN — LEVOTHYROXINE SODIUM 112 MCG: 112 TABLET ORAL at 07:24

## 2019-04-13 ASSESSMENT — PAIN SCALES - PAIN ASSESSMENT IN ADVANCED DEMENTIA (PAINAD)
FACIALEXPRESSION: 1
NEGVOCALIZATION: 1
BODYLANGUAGE: 0
FACIALEXPRESSION: 1
BREATHING: 0
TOTALSCORE: 2
CONSOLABILITY: 1
NEGVOCALIZATION: 1
TOTALSCORE: 2
BREATHING: 0
FACIALEXPRESSION: 1
TOTALSCORE: 4
TOTALSCORE: 5
BODYLANGUAGE: 1
FACIALEXPRESSION: 0
BREATHING: 0
BODYLANGUAGE: 1
NEGVOCALIZATION: 0
FACIALEXPRESSION: 0
CONSOLABILITY: 1
CONSOLABILITY: 1
TOTALSCORE: 4
BODYLANGUAGE: 1
FACIALEXPRESSION: 0
BREATHING: 0
TOTALSCORE: 4
NEGVOCALIZATION: 1
NEGVOCALIZATION: 1
BODYLANGUAGE: 2
BREATHING: 0
CONSOLABILITY: 2
CONSOLABILITY: 1
CONSOLABILITY: 1
NEGVOCALIZATION: 1
BODYLANGUAGE: 1
BREATHING: 0

## 2019-04-13 ASSESSMENT — PAIN SCALES - GENERAL
PAINLEVEL_OUTOF10: 2
PAINLEVEL_OUTOF10: 4

## 2019-04-13 NOTE — ED NOTES
RN bedside pt very impulsive, repeatedly trying to get OOB. RN asking caregiver at bedside for assistance.       Rachele Winston RN  04/12/19 9817

## 2019-04-13 NOTE — PLAN OF CARE
Nutrition Problem: Severe malnutrition  Intervention: Food and/or Nutrient Delivery: Continue NPO, Start oral diet  Nutritional Goals: Pt will tolerate most appropriate form of nutrition therapy when initiated

## 2019-04-13 NOTE — PROGRESS NOTES
Pt cooperates with cares, following directions such as \"hold out your arm so I can scan your ID bracelet\" and \"lift up your hips\" when changing incontinence briefs. Pt will at times reposition self in bed upon direction.

## 2019-04-13 NOTE — ED NOTES
Pt sitting up in bed, looking around room. Caregiver at bedside.       Makayla Quiroz RN  04/12/19 6668

## 2019-04-13 NOTE — ED NOTES
RN at bedside valentine care provided. Pt resting in bed with eyes closed.      Makayla Quiroz RN  04/12/19 2033

## 2019-04-13 NOTE — PROGRESS NOTES
4 Eyes Admission Assessment     I agree as the admission nurse that 2 RN's have performed a thorough Head to Toe Skin Assessment on the patient. ALL assessment sites listed below have been assessed on admission. Areas assessed by both nurses: ***  [x]   Head, Face, and Ears   [x]   Shoulders, Back, and Chest    [x]   Arms, Elbows, and Hands   [x]   Coccyx, Sacrum, and Ischum  [x]   Legs, Feet, and Heels        Does the Patient have Skin Breakdown? Pt has multiple abrasions, bruises and open wounds down the front of his legs. Bruises on his face and near eyes along with stiches to his L eyebrow from previous fall. A red small raised bump on his head beneath hair. Blanchable redness on his backside.     Charlie Prevention initiated:  YES  Wound Care Orders initiated: NO      [facility] nurse consulted for Pressure Injury (Stage 3,4, Unstageable, DTI, NWPT, and Complex wounds):  NO                                      Nurse 1 eSignature: {Esignature:[id number]}    **SHARE this note so that the co-signing nurse is able to place an eSignature**    Nurse 2 eSignature: {Esignature:[id number]}

## 2019-04-13 NOTE — H&P
BLOODU SMALL 04/12/2019    SPECGRAV 1.025 04/12/2019    GLUCOSEU Negative 04/12/2019       Radiology:     CT head without contrast  Impression   There is an acute left-sided subdural hematoma measuring 1 cm causing   effacement of the sulci and mild midline shift toward the right as discussed   above.  No other obvious acute finding within limitations of the exam.         CT head without contrast    (Results Pending)       ASSESSMENT & PLAN:  Esha Marte is a 46 y.o. male w/ PMH MRDD and seizures presenting with increased lethargy and decreased appetite. Subdural hematoma  Pt with lethargy and decreased appetite. History is limited, unclear if he had new fall since 4/3. CT head with acute L sided 1 cm SDH with mild midline shift. Neuroexam limited but grossly moving all 4 extremities.   - NSGY consulted  - repeat CT head  - SBP < 160, labetalol PRN q6H  - keep HOB > 30  - neuro check q1h  - PT/OT  - SLP  - NPO    SAUL  Likely prerenal 2/2 to poor PO intake/diarrhea  - 500cc bolus NS  - [email or web address]    MRDD  Baseline non-verbal, cannot follow commands. Attempts to get out of bed, insomnia. Walks with walker/wheelchair. Usually has great appetite, excited for food although this is different lately. Seizures  H/o seizures. On carbamazepine, keppra, depakote. In ED, found to be subtherapeutic on carbamazepine and valproic acid. - cont home carbamazepine 200 BID, depakote 250 AM, 500 PM, keppra 500 BID    Hypothyroidism  - cont home synthroid 112 daily    BPH  - cont home flomax 0.4 daily    Diarrhea  Pt with diarrhea for past 5 days.    - holding home miralax  - cont home laculose 20g BID      DVT Prophylaxis: holding AC, SCDs  Diet: Diet NPO Effective Now  Code Status: Full Code    PT/OT Eval Status: strict bedrest    Dispo - ICU      Alison Bañuelos MD  Internal Medicine, PGY 1    I will discuss the patient with the senior resident and Carlie Ellison MD

## 2019-04-13 NOTE — PLAN OF CARE
Problem: Falls - Risk of:  Goal: Will remain free from falls  Description  Patient has been free from falls and injuries this shift. Lauren fall risk assessed each shift. Bed locked in lowest position, alarm engaged. Fall bracelet in place, fall sign present outside door, fall socks present on patient. Room free of clutter. Will continue to round and assess needs. Pt receiving one on one pt care due to decreased neurologic state and continuing to make attempts at getting out of bed. Outcome: Ongoing     Problem: Risk for Impaired Skin Integrity  Goal: Tissue integrity - skin and mucous membranes  Description  Patient's skin assessed q4h hours and prn. Patient turns self frequently. Skin is kept clean and dry. Preventative dressings have been applied. Sacral heart placed and assessed to be CDI. Charlie scale is assessed and documented each shift. Patient's skin integrity maintained throughout shift. Outcome: Ongoing     Problem: Pain:  Goal: Pain level will decrease  Description  Patient's pain assessed every shift, post procedure, prn, or any changes in status. Pt's pain is assessed using Advanced Dementia pain scale. . Pt's pain has been controlled this shift.        Outcome: Ongoing

## 2019-04-13 NOTE — PROGRESS NOTES
Nutrition Assessment (Enteral Nutrition)    Type and Reason for Visit: Positive Nutrition Screen    Nutrition Recommendations:   Continue NPO, monitor ability to advance diet as tolerated per Speech Therapy  Monitor weight, labs and clinical progress  If unable to advance diet, suggest nutrition support via TF    Enteral Recommendations:  1. Recommend order \"Diet: Tube feed continuous/ NPO\". Initiate Jevity 1.2 (Standard with Fiber formula) at 20 mL/hr and as tolerated, increase by 20 mL/hr q 4 hours until goal of 60 mL/hr is met via NG  2. Recommend 100mL H20 q 4 hours. Recommend reevaluate IV fluids at this time. Monitor for tolerance (abdominal distention, bowel habits, N/V, cramping). Meets ASPEN criteria for severe malnutrition. Aggressive nutrition intervention should be considered, up to and including alternative means of nutrition/hydration, if nutrition status can not improve. Nutrition Assessment: Positive screen for Weight loss, Poor appetite, Swallowing difficulty w/ food, Malnutrition. Pt is s/p fall. RD unable to interview pt d/t severe MRDD. Pt is severely malnourished on admit AEB severe loss of subcutaneous fat and muscle mass with low BMI. Pt is at risk for further compromise d/t NPO status and diarrhea for the past 5 days. Will monitor ability to advance diet per Speech Therapy, need for aggressive nutrition intervention via nutrition support, provide TF recs and monitor nutrition status. Malnutrition Assessment:  · Malnutrition Status: Meets the criteria for severe malnutrition  · Context: Chronic illness  · Findings of the 6 clinical characteristics of malnutrition (Minimum of 2 out of 6 clinical characteristics is required to make the diagnosis of moderate or severe Protein Calorie Malnutrition based on AND/ASPEN Guidelines):  1. Energy Intake-Unable to assess,      2. Weight Loss-Unable to assess,    3.  Fat Loss-Severe subcutaneous fat loss(per visual), Orbital, Triceps  4. Muscle Loss-Severe muscle mass loss, Temples (temporalis muscle), Clavicles (pectoralis and deltoids), Thigh (quadriceps), Calf (gastrocnemius)(per visual)  5. Fluid Accumulation-No significant fluid accumulation,    6.  Strength-Not measured    Nutrition Risk Level: High    Nutrition Needs:  · Estimated Daily Total Kcal: 7474-1494 (33-35 + 250 kcal/day for wt gain)  · Estimated Daily Protein (g): 65-77 (1.5-1.8)  · Estimated Daily Fluid (ml/day): 9555-1134 (1 ml/kcal)    Nutrition Diagnosis:   · Problem: Severe malnutrition  · Etiology: related to Insufficient energy/nutrient consumption     Signs and symptoms:  as evidenced by Severe loss of subcutaneous fat, BMI, Severe muscle loss    Objective Information:  · Nutrition-Focused Physical Findings: cachectic; last BM x 1 on 4/13; no edema; nonverbal   · Wound Type: None  · Current Nutrition Therapies:  · Oral Diet Orders: NPO(can use applesauce for meds)   · Oral Diet intake: NPO  · Oral Nutrition Supplement (ONS) Orders: None  · Tube Feeding (TF) Orders:   · Goal TF & Flush Orders Provides: RD Goal: Jevity 1.2 (Standard with Fiber) at 60 ml/hr to provide 1440 ml TV, 1728 kcal, 80 grams protein, 1162 ml free water.  Water flush 100 ml every 4 hours  · Anthropometric Measures:  · Ht: 5' 8\" (172.7 cm)   · Current Body Wt: 94 lb (42.6 kg)  · Admission Body Wt: 94 lb (42.6 kg)  · Usual Body Wt: 113 lb (51.3 kg)(only wt per EMR)  · Ideal Body Wt: 154 lb (69.9 kg),    · BMI Classification: BMI <18.5 Underweight    Nutrition Interventions:   Continue NPO, Start oral diet  Continued Inpatient Monitoring    Nutrition Evaluation:   · Evaluation: Goals set   · Goals: Pt will tolerate most appropriate form of nutrition therapy when initiated   · Monitoring: Nutrition Progression, Weight, Pertinent Labs, Chewing/Swallowing, Skin Integrity, Monitor Bowel Function      Electronically signed by Devin Kern RD, LD on 4/13/19 at 12:57 PM    Contact Number: [phone]

## 2019-04-13 NOTE — PROGRESS NOTES
Pt very restless, requiring this RN and a PCA to maintain pt in bed. Pt not following commands. Will continue to monitor.

## 2019-04-13 NOTE — ED NOTES
RN at bedside, valentine care provided. Pt placed in a new hospital gown and pants.       Kory Mcarthur RN  04/12/19 9196

## 2019-04-13 NOTE — ED NOTES
RN reintegrated to caregivers at bedside the importance of constant supervision, due to pt being very restless and impulsive.       Murphy Patrick RN  04/12/19 2852

## 2019-04-13 NOTE — PROGRESS NOTES
Attempted to administer medication crushed in applesauce as patient takes pills this way at home. Pt alert, opened mouth for spoon, then held applesauce in mouth without swallowing. Attempted giving water with straw as patient drinks thins with straw at home. Pt opened mouth for straw, applesauce running out of mouth and did not swallow water. ICU residents notified of above.

## 2019-04-13 NOTE — PROGRESS NOTES
Pt report called @ [phone] from [facility] at [facility]. Pt arrived at [facility] @ [address] by EMS. Admitted to [facility]  by the RN. Caregiver from [facility] at bedside. Resident also at bedside for admission and pt history. Upon arrival pt VSS. Pt alert and sitting up in the bed. Pt nonverbal at baseline per caregiver. Pt unable to follow commands but able to move all extremeties. Making multiple attempts to get out of bed. Seizure precautions in place. This RN at bedside. Will continue to monitor.

## 2019-04-13 NOTE — CONSULTS
[address] [facility]   Neurosurgery Consultation        Patient: Clearence Kanner  Consultant: Lisa Moon MD, PhD        HPI: Clearence Kanner is a 46 y.o. male patient with PMH severe MRDD, seizures, schizophrenia, hypothyroidism, BPH who presents with increased lethargy, decreased appetite and new bruises. Jose Evans on 4/3 an seen in ED with small SDH and sent home. Returns now with above c/o. Patient cannot provide a history secondary to severe MRDD. At baseline he is nonverbal and noncompliant with safety measures surrounding his need to use a walker. No past medical history on file. No past surgical history on file. Patient has no allergy information on record.     Current Facility-Administered Medications:     ARIPiprazole (ABILIFY) tablet 20 mg, 20 mg, Oral, Nightly, Patricia Merlos MD    carBAMazepine (TEGRETOL) tablet 200 mg, 200 mg, Oral, BID, Patricia Merlos MD    clonazePAM [facility]) tablet 1 mg, 1 mg, Oral, Nightly, Patricia Merlos MD, Stopped at 04/13/19 0416    divalproex (DEPAKOTE SPRINKLE) capsule 250 mg, 250 mg, Oral, Daily, Patricia Merlos MD    divalproex (DEPAKOTE SPRINKLE) capsule 500 mg, 500 mg, Oral, Nightly, Patricia Merlos MD    lactulose (CHRONULAC) 10 GM/15ML solution 20 g, 20 g, Oral, BID, Patricia Merlos MD    levETIRAcetam (KEPPRA) tablet 500 mg, 500 mg, Oral, BID, Patricia Merlos MD    levothyroxine (SYNTHROID) tablet 112 mcg, 112 mcg, Oral, Daily, Patricia Merlos MD, 112 mcg at 04/13/19 0724    mirtazapine (REMERON) tablet 30 mg, 30 mg, Oral, Nightly, Patricia Merlos MD    [facility] capsule 0.4 mg, 0.4 mg, Oral, Daily, Patricia Merlos MD    sodium chloride flush 0.9 % injection 10 mL, 10 mL, Intravenous, 2 times per day, Patricia Merlos MD    sodium chloride flush 0.9 % injection 10 mL, 10 mL, Intravenous, PRN, Patricia Merlos MD    acetaminophen (TYLENOL) tablet 650 mg, 650 mg, Oral, Q4H PRN, Patricia Merlos MD    magnesium hydroxide (MILK OF MAGNESIA) 400 MG/5ML suspension 30 mL, 30 mL, Oral, Daily PRN, Colette Klein MD    [facility]ansetron [facility]) injection 4 mg, 4 mg, Intravenous, Q6H PRN, Colette Klein MD    labetalol (NORMODYNE;TRANDATE) injection 10 mg, 10 mg, Intravenous, Q6H PRN, Colette Klein MD    0.9 % sodium chloride infusion, , Intravenous, Continuous, Colette Klein MD, Last Rate: 75 mL/hr at 04/13/19 0657    clonazePAM (KLONOPIN) tablet 0.5 mg, 0.5 mg, Oral, BID, Colette Klein MD    0.9 % sodium chloride bolus, 250 mL, Intravenous, Once, Emeka Ryan MD  Social History     Socioeconomic History    Marital status: Single     Spouse name: Not on file    Number of children: Not on file    Years of education: Not on file    Highest education level: Not on file   Occupational History    Not on file   Social Needs    Financial resource strain: Not on file    Food insecurity:     Worry: Not on file     Inability: Not on file    Transportation needs:     Medical: Not on file     Non-medical: Not on file   Tobacco Use    Smoking status: Not on file   Substance and Sexual Activity    Alcohol use: Not on file    Drug use: Not on file    Sexual activity: Not on file   Lifestyle    Physical activity:     Days per week: Not on file     Minutes per session: Not on file    Stress: Not on file   Relationships    Social connections:     Talks on phone: Not on file     Gets together: Not on file     Attends [employer] service: Not on file     Active member of club or organization: Not on file     Attends meetings of clubs or organizations: Not on file     Relationship status: Not on file    Intimate partner violence:     Fear of current or ex partner: Not on file     Emotionally abused: Not on file     Physically abused: Not on file     Forced sexual activity: Not on file   Other Topics Concern    Not on file   Social History Narrative    Not on file     No family history on file.     ROS: Complete 10 point ROS was obtained with positives in HPI, otherwise negative. Physical Exam:    Temp (24hrs), Av.8 °F (36.6 °C), Min:97.5 °F (36.4 °C), Max:98 °F (36.7 °C)      Neuro Exam    -lying bed and moving all fours spontaneously and purposefully - trying to get out of bed  -PERRL, EOMI with conjugate gaze  -Awake but nonverbal and will not follow commands (moans and grunts)  -bruises everywhere        Labs:  Recent Labs     19  0435   WBC 8.7   HGB 12.2*   HCT 36.4*   PLT 56*       Recent Labs     19  0434      K 4.6      CO2 29   BUN 53*   CREATININE 1.1   GLUCOSE 53*   CALCIUM 9.4       Recent Labs     19  1934   PROTIME 12.0   INR 1.05   APTT 43.4*       Imaging Studies:     Assessment:  -left hemispheric acute SDH stable on repeat head CT without focal neuro deficit or aletered LOC    Plan:  -Remain in ICU today  -Will need sitter 24/ secondary to noncompliance and fall risk. Will need assessment of safety measures when returns to ECF. -acute surgical decompression not needed and given patient's inability to comply with instructions he is a very poor surgical candidate.   -repeat head CT in am (). If stable, then can transfer to floor  -hold all anticoagulants  -maintain normotension  -Keep NPO today. If head CT stable , then can ADAT. Thank you for asking me to see this patient.     Jane Fitzgerald MD, PhD  [place]

## 2019-04-13 NOTE — ED NOTES
Dr. Shelley Alamo called back from [facility] [address]  04/12/19 2047

## 2019-04-13 NOTE — PLAN OF CARE
Problem: Falls - Risk of:  Goal: Will remain free from falls  Description  Patient has been free from falls and injuries this shift. Lauren fall risk assessed each shift. Bed locked in lowest position, alarm engaged. Fall bracelet in place, fall sign present outside door, fall socks present on patient. Room free of clutter. Will continue to round and assess needs. Pt receiving one on one pt care due to decreased neurologic state and continuing to make attempts at getting out of bed.      4/13/2019 1314 by Lita Mcginnis RN  Outcome: Ongoing  Note:   Pt is a high fall risk. Fall risk protocol in place. See Robert Pablo Fall Score. Pt bed is in low position, bed alarm is on, side rails up, fall risk bracelet applied. , non-skid footwear in use. Patient/family educated on fall risk protocol, instructed to call for assistance when needed and belongings are in reach. Sitter 24/7 for safety. Will continue with hourly rounds for po intake, pain needs, toileting and repositioning as needed. Will continue to monitor for needs. 4/13/2019 0703 by Rosas Corrales RN  Outcome: Ongoing  Goal: Absence of physical injury  Description  Absence of physical injury  Outcome: Ongoing     Problem: Risk for Impaired Skin Integrity  Goal: Tissue integrity - skin and mucous membranes  Description  Patient's skin assessed q4h hours and prn. Patient turns self frequently. Skin is kept clean and dry. Preventative dressings have been applied. Sacral heart placed and assessed to be CDI. Charlie scale is assessed and documented each shift. Patient's skin integrity maintained throughout shift. 4/13/2019 1314 by Lita Mcginnis RN  Outcome: Ongoing  Note:   Pt skin is pale with poor turgor. Pt has multiple areas of bruising and abrasions to brows, cheekbones, BUE, BLE. Bilateral shins with abrasions under mepilex. Bilateral elbows are reddened. Buttocks are reddened. Sacral heart removed due to frequent soiling.  Pt has rounded, soft mobile mass under chin, skin of which is reddened. Pt constantly moves in bed. Assisting patient with very frequent repositioning. Jeanne care at time of soiling. Will continue to monitor. 4/13/2019 0703 by Rosas Corrales RN  Outcome: Ongoing     Problem: Pain:  Goal: Pain level will decrease  Description  Patient's pain assessed every shift, post procedure, prn, or any changes in status. Pt's pain is assessed using Advanced Dementia pain scale. . Pt's pain has been controlled this shift. 4/13/2019 1314 by Lita Mcginnis RN  Outcome: Ongoing  4/13/2019 0703 by Rosas Corrales RN  Outcome: Ongoing  Goal: Control of acute pain  Description  Control of acute pain  Outcome: Ongoing  Note:   Advanced dementia scale in use for pain assessment. Pt is NPO, unable to swallow pain medications. Non-pharmacological pain relief methods utilized. Will continue to monitor for pain and advocate for appropriate pain control. Goal: Control of chronic pain  Description  Control of chronic pain  Outcome: Ongoing     Problem: Nutrition  Goal: Optimal nutrition therapy  Description  Nutrition Problem: Severe malnutrition  Intervention: Food and/or Nutrient Delivery: Continue NPO, Start oral diet  Nutritional Goals: Pt will tolerate most appropriate form of nutrition therapy when initiated     4/13/2019 1314 by Lita Mcginnis RN  Outcome: Ongoing  Note:   Pt reportedly takes pureed foods and thin liquids with a straw at care facility. Pt appears eager to eat, opening mouth when applesauce is offered. However pt does not swallow but holds foods and liquids in his mouth. Pt is cachectic with poor skin turgor. Will continue to collaborate with RD for appropriate nutrition.    4/13/2019 1304 by Grecia Wong RD, LD  Outcome: Ongoing  Note:   Nutrition Problem: Severe malnutrition  Intervention: Food and/or Nutrient Delivery: Continue NPO, Start oral diet  Nutritional Goals: Pt will tolerate most appropriate form of nutrition therapy when initiated       Problem: HEMODYNAMIC STATUS  Goal: Patient has stable vital signs and fluid balance  Outcome: Ongoing  Note:   Pt's VSS. Will continue to monitor. Problem: ACTIVITY INTOLERANCE/IMPAIRED MOBILITY  Goal: Mobility/activity is maintained at optimum level for patient  Outcome: Ongoing  Note:   Pt is on bedrest. Continuously attempts to exit bed. Sitter at bedside 24/7 for safety.

## 2019-04-13 NOTE — PROGRESS NOTES
Speech Language Pathology  Dysphagia Evaluation HOLD    Chart reviewed. Pt to be NPO today per neurosx. Will f/u tomorrow with BSE as pt able to participate and schedule permits.     Cameron Plunkett MA CCC-SLP; [phone]  Speech-Language Pathologist  Treatment: 0  Pager # [phone]

## 2019-04-13 NOTE — PROGRESS NOTES
ICU Progress Note    Admit Date: 4/13/2019  CC: Lethargy & Decreased Appetite   ICU Day: Hospital Day: 1  IV Access: PIV  IV Fluids: [email or web address]  Diet: Diet NPO Effective Now  Code Status: Full Code     SUBJECTIVE:   INTERVAL HISTORY: Patient slept s/p Versed 3mg overnight. This morning, he is agitated and attempting to get out of bed. He did not tolerate his daily medications crushed in apple sauce. Pt remains NPO. Neuro checks q1h impossible. Repeat CT head showed the SDH to be stable. Caregiver contacted. Reaffirmed full code. Review of Systems   Unable to perform ROS: Psychiatric disorder     OBJECTIVE:   VITALS:   Patient Vitals for the past 8 hrs:   BP Temp Temp src Pulse Resp SpO2 Height Weight   04/13/19 1107 120/75 -- -- 70 14 -- -- --   04/13/19 1000 126/73 -- -- 65 -- -- -- --   04/13/19 0900 114/83 -- -- 80 -- -- -- --   04/13/19 0800 108/79 -- -- 72 18 99 % -- --   04/13/19 0700 126/73 -- -- 68 -- -- -- --   04/13/19 0600 (!) 125/104 -- -- 60 -- -- -- --   04/13/19 0500 (!) 99/55 -- -- 72 -- -- -- --   04/13/19 0400 102/63 -- -- 54 -- -- -- --   04/13/19 0352 -- -- -- -- -- -- 5' 8\" (1.727 m) 94 lb 12.8 oz (43 kg)   04/13/19 0345 103/72 -- -- (!) 47 -- -- -- --       INTAKE/OUTPUT:    Intake/Output Summary (Last 24 hours) at 4/13/2019 1131  Last data filed at 4/13/2019 0320  Gross per 24 hour   Intake --   Output 0 ml   Net 0 ml       Physical Exam   Constitutional: He appears well-developed. He appears cachectic. He is active and uncooperative. No distress. HENT:   Head: Normocephalic and atraumatic. Eyes: Pupils are equal, round, and reactive to light. Conjunctivae are normal.   Neck: Neck supple. Cardiovascular: Normal rate, regular rhythm and normal heart sounds. Pulmonary/Chest: Effort normal and breath sounds normal. No respiratory distress. Abdominal: Soft. He exhibits no distension. There is no tenderness.    Neurological: He is alert. Grossly non-focal exam, moving all extremities. Neuro Exam limited due to patient's ability to participate. Skin: Abrasion and bruising (over Bilateral lower extremities) noted. Psychiatric: He is agitated. Cognition and memory are impaired. He expresses impulsivity and inappropriate judgment. He is noncommunicative. He is inattentive. Nursing note and vitals reviewed. LABORATORY RESULTS:  Recent Labs     04/12/19  1457 04/13/19  0435   WBC 5.2 8.7   HGB 12.9* 12.2*   HCT 38.2* 36.4*   PLT 95* 56*   MCV 91.8 90.7     Recent Labs     04/12/19  1457 04/13/19  0434    145   K 4.6 4.6   CL 99 103   CO2 32 29   BUN 48* 53*   CREATININE 1.4* 1.1   GLUCOSE 78 53*   CALCIUM 9.4 9.4   ANIONGAP 8 13     Recent Labs     04/12/19  1457   AST 58*   ALT 67*   BILITOT <0.2   PROT 7.5   LABALBU 3.5   ALKPHOS 132*     No results for input(s): TROPONINI in the last 72 hours. No results for input(s): BNP in the last 72 hours. No results for input(s): CHOL, HDL in the last 72 hours. Invalid input(s): LDLCALCU, TRIGLYCERIDE  No results for input(s): PHART, EKJ1IWM, PO2ART, AMS8TJG, BEART, THGBART, H3TGUZBK, MNR8ORE in the last 72 hours. Recent Labs     04/12/19  1934   INR 1.05     No results for input(s): LACTATE in the last 72 hours.   Cultures: -    IMAGING:   CT head without contrast   Final Result     No change              SCHEDULED MEDICATIONS:   [Held by provider] ARIPiprazole  20 mg Oral Nightly    [Held by provider] carBAMazepine  200 mg Oral BID    [Held by provider] clonazePAM  1 mg Oral Nightly    [Held by provider] lactulose  20 g Oral BID    [Held by provider] levothyroxine  112 mcg Oral Daily    [Held by provider] mirtazapine  30 mg Oral Nightly    [Held by provider] tamsulosin  0.4 mg Oral Daily    sodium chloride flush  10 mL Intravenous 2 times per day    [Held by provider] clonazePAM  0.5 mg Oral BID    sodium chloride  250 mL Intravenous Once    levetiracetam  500 mg Intravenous Q12H    valproate sodium (DEPACON) IVPB  190 mg Intravenous Q6H     CONTINUOUS INFUSIONS:   sodium chloride 75 mL/hr at 04/13/19 0657    dextrose       PRN MEDICATIONS:sodium chloride flush, acetaminophen, magnesium hydroxide, ondansetron, labetalol, glucose, dextrose, glucagon (rDNA), dextrose    Assessment/Plan:     NEUROLOGIC:   Acute Toxic-Metabolic Encephalopathy superimposed on MRDD  Etiology is likely related to patient's acute illness: SDH, Hypoglycemia. And superimposed on baseline MRDD. Baseline non-verbal, cannot follow commands. Attempts to get out of bed, insomnia. Walks with walker/wheelchair. Usually has great appetite, excited for food although this is different lately. -supportive care, tx contributing etiologies below  -[address] [facility] [phone]. Acute SDH:  Etiology: Unclear as history is limited, but likely Trauma 2/2 another fall (recent SDH 2/2 fall on 4/3). CT Head 4/12 acute L sided 1 cm SDH with mild midline shift, repeat CT Head 4/13 showed no change. Neuroexam limited but grossly non-focal, moving all 4 extremities.   - NSGY consulted. No further intervention              -- remain in ICU today              -- 24/7 sitter for noncompliance/fall risk              -- repeat CT head 4/14 am - if stable, transfer to floor              -- [facility] & antiplatelets              -- keep SBP <160              -- keep NPO 4/13   - neuro check q1h as tolerated  - Transfuse platelets > 356D  - Maintain Strict Euglycemia: goal glucose 120  - Hypertensive control: Goal SBP < 140-160  - Continue home Keppra 500mg BID  - Neurochecks Q1H    Hx of Seizures  On carbamazepine, keppra, depakote. In ED, found to be subtherapeutic on carbamazepine and valproic acid. . Home regimen: carbamazepine 200 BID, depakote 250 AM, 500 PM, keppra 500 BID Could not tolerate PO meds this AM   - Keppra 500mg IV q12  - valproate 190mg IV q6h    CARDIOVASCULAR:   PULMONARY: RENAL / GENITOURINARY:   SAUL, POA (RESOLVED): Likely prerenal 2/2 to poor PO intake/diarrhea, s/p 500cc bolus NS. Cr 1.4>1.1 today. BPH  - holding PO flomax while NPO   - bladder scan q shift & for signs/symptoms of urinary retention    ENDOCRINE:   Hypoglycemia:  Unclear etiology. On renal panel and Fingerstick. Not consistent w/ whipple's Triad. May need workup.    -Accuchecks q4h  -Hypoglycemia Protocol  -cautious IV dextrose in head bleed    Hypothyroidism  - holding PO Synthroid while NPO    GASTROINTESTINAL:   Diarrhea, POA (RESOLVED) Pt with diarrhea for past 5 days, likely contributing to SAUL. No BM since admission.   - holding home miralax  - holding lactulose     FLUIDS / ELECTROLYTES / NUTRITION:   IVF: [email or web address]    ACTIVITY: Bedrest  PPX: No anticoagulation or antiplatelet  Diet NPO Effective Now - SLP consulted: will f/u tomorrow with BSE  CODE STATUS: Full Code  DISPOSITION: ICU  - PT/OT eval: strict bedrest  - discussed patient with [address] [place] [address] [phone]. This patient will be discussed with Dr. Solomon Habermann. Scribe Larey Hodgkins Der HOLWELL, MS4  Claris Sever, MD  4/13/2019 11:31 AM    Pulmonary/CC    Patient seen and examined. I agree with Dr. Maddi Jung history, physical, lab findings, assessment and plan. Assessment  1. Traumatic SDH - symptomatic worsening of subacute SDH  2. Thrombocytopenia - acute on chronic  3. SAUL - improving  4. MRDD  5. Seizure disorder    Plan  1. Neurosurgery has seen the patient and deemed him best for medical management  2. Will get a platelet transfusion with goal platelets greater than 80,000  3. Head CT tomorrow  4. Goal SBP < 160  5. Change Keppra to IV. Resume oral AEDs when able to take PO  6. No anticoagulants  7. Sitter at bedside  8. NPO.  Swallow eval in AM    Keep in ICU    Mouna Walsh MD

## 2019-04-13 NOTE — ED NOTES
Report called to Alicia LUKE- Jordin Rojo. Handoff of pts pain score and fall risk.       Louis Mock RN  04/12/19 7699

## 2019-04-14 ENCOUNTER — APPOINTMENT (OUTPATIENT)
Dept: CT IMAGING | Age: 49
DRG: 082 | End: 2019-04-14
Attending: FAMILY MEDICINE
Payer: MEDICARE

## 2019-04-14 LAB
ANION GAP SERPL CALCULATED.3IONS-SCNC: 12 MMOL/L (ref 3–16)
BASOPHILS ABSOLUTE: 0 K/UL (ref 0–0.2)
BASOPHILS RELATIVE PERCENT: 0.2 %
BUN BLDV-MCNC: 47 MG/DL (ref 7–20)
CALCIUM SERPL-MCNC: 9.4 MG/DL (ref 8.3–10.6)
CHLORIDE BLD-SCNC: 102 MMOL/L (ref 99–110)
CO2: 29 MMOL/L (ref 21–32)
CREAT SERPL-MCNC: 1 MG/DL (ref 0.9–1.3)
EOSINOPHILS ABSOLUTE: 0 K/UL (ref 0–0.6)
EOSINOPHILS RELATIVE PERCENT: 0.1 %
GFR AFRICAN AMERICAN: >60
GFR NON-AFRICAN AMERICAN: >60
GLUCOSE BLD-MCNC: 130 MG/DL (ref 70–99)
GLUCOSE BLD-MCNC: 135 MG/DL (ref 70–99)
GLUCOSE BLD-MCNC: 56 MG/DL (ref 70–99)
GLUCOSE BLD-MCNC: 57 MG/DL (ref 70–99)
GLUCOSE BLD-MCNC: 62 MG/DL (ref 70–99)
GLUCOSE BLD-MCNC: 63 MG/DL (ref 70–99)
GLUCOSE BLD-MCNC: 66 MG/DL (ref 70–99)
GLUCOSE BLD-MCNC: 76 MG/DL (ref 70–99)
GLUCOSE BLD-MCNC: 81 MG/DL (ref 70–99)
GLUCOSE BLD-MCNC: 83 MG/DL (ref 70–99)
GLUCOSE BLD-MCNC: 94 MG/DL (ref 70–99)
GLUCOSE BLD-MCNC: 95 MG/DL (ref 70–99)
HCT VFR BLD CALC: 36 % (ref 40.5–52.5)
HEMOGLOBIN: 12.1 G/DL (ref 13.5–17.5)
LYMPHOCYTES ABSOLUTE: 0.2 K/UL (ref 1–5.1)
LYMPHOCYTES RELATIVE PERCENT: 2.7 %
MCH RBC QN AUTO: 30.3 PG (ref 26–34)
MCHC RBC AUTO-ENTMCNC: 33.6 G/DL (ref 31–36)
MCV RBC AUTO: 90.3 FL (ref 80–100)
MONOCYTES ABSOLUTE: 0.2 K/UL (ref 0–1.3)
MONOCYTES RELATIVE PERCENT: 2.5 %
NEUTROPHILS ABSOLUTE: 7.7 K/UL (ref 1.7–7.7)
NEUTROPHILS RELATIVE PERCENT: 94.5 %
PDW BLD-RTO: 18.5 % (ref 12.4–15.4)
PERFORMED ON: ABNORMAL
PERFORMED ON: NORMAL
PLATELET # BLD: 131 K/UL (ref 135–450)
PMV BLD AUTO: 9.7 FL (ref 5–10.5)
POTASSIUM SERPL-SCNC: 4.6 MMOL/L (ref 3.5–5.1)
RBC # BLD: 3.99 M/UL (ref 4.2–5.9)
SODIUM BLD-SCNC: 143 MMOL/L (ref 136–145)
URINE CULTURE, ROUTINE: NORMAL
WBC # BLD: 8.1 K/UL (ref 4–11)

## 2019-04-14 PROCEDURE — 6360000002 HC RX W HCPCS

## 2019-04-14 PROCEDURE — 51798 US URINE CAPACITY MEASURE: CPT

## 2019-04-14 PROCEDURE — 2580000003 HC RX 258: Performed by: STUDENT IN AN ORGANIZED HEALTH CARE EDUCATION/TRAINING PROGRAM

## 2019-04-14 PROCEDURE — 80048 BASIC METABOLIC PNL TOTAL CA: CPT

## 2019-04-14 PROCEDURE — 2500000003 HC RX 250 WO HCPCS: Performed by: STUDENT IN AN ORGANIZED HEALTH CARE EDUCATION/TRAINING PROGRAM

## 2019-04-14 PROCEDURE — 85025 COMPLETE CBC W/AUTO DIFF WBC: CPT

## 2019-04-14 PROCEDURE — 1200000000 HC SEMI PRIVATE

## 2019-04-14 PROCEDURE — 2580000003 HC RX 258

## 2019-04-14 PROCEDURE — 92610 EVALUATE SWALLOWING FUNCTION: CPT

## 2019-04-14 PROCEDURE — 6360000002 HC RX W HCPCS: Performed by: STUDENT IN AN ORGANIZED HEALTH CARE EDUCATION/TRAINING PROGRAM

## 2019-04-14 PROCEDURE — 36415 COLL VENOUS BLD VENIPUNCTURE: CPT

## 2019-04-14 PROCEDURE — 99233 SBSQ HOSP IP/OBS HIGH 50: CPT | Performed by: INTERNAL MEDICINE

## 2019-04-14 PROCEDURE — 70450 CT HEAD/BRAIN W/O DYE: CPT

## 2019-04-14 RX ORDER — MIDAZOLAM HYDROCHLORIDE 1 MG/ML
INJECTION INTRAMUSCULAR; INTRAVENOUS
Status: COMPLETED
Start: 2019-04-14 | End: 2019-04-14

## 2019-04-14 RX ORDER — DEXTROSE AND SODIUM CHLORIDE 5; .45 G/100ML; G/100ML
INJECTION, SOLUTION INTRAVENOUS CONTINUOUS
Status: DISCONTINUED | OUTPATIENT
Start: 2019-04-14 | End: 2019-04-17

## 2019-04-14 RX ORDER — DEXTROSE AND SODIUM CHLORIDE 5; .45 G/100ML; G/100ML
INJECTION, SOLUTION INTRAVENOUS CONTINUOUS
Status: DISCONTINUED | OUTPATIENT
Start: 2019-04-14 | End: 2019-04-14

## 2019-04-14 RX ORDER — DEXTROSE AND SODIUM CHLORIDE 5; .2 G/100ML; G/100ML
INJECTION, SOLUTION INTRAVENOUS CONTINUOUS
Status: DISCONTINUED | OUTPATIENT
Start: 2019-04-14 | End: 2019-04-14

## 2019-04-14 RX ORDER — MIDAZOLAM HYDROCHLORIDE 1 MG/ML
5 INJECTION INTRAMUSCULAR; INTRAVENOUS ONCE
Status: COMPLETED | OUTPATIENT
Start: 2019-04-14 | End: 2019-04-14

## 2019-04-14 RX ORDER — DEXTROSE AND SODIUM CHLORIDE 5; .45 G/100ML; G/100ML
INJECTION, SOLUTION INTRAVENOUS
Status: COMPLETED
Start: 2019-04-14 | End: 2019-04-14

## 2019-04-14 RX ADMIN — Medication 10 ML: at 20:46

## 2019-04-14 RX ADMIN — LEVETIRACETAM 500 MG: 100 INJECTION, SOLUTION INTRAVENOUS at 21:33

## 2019-04-14 RX ADMIN — DEXTROSE AND SODIUM CHLORIDE: 5; 200 INJECTION, SOLUTION INTRAVENOUS at 16:04

## 2019-04-14 RX ADMIN — DEXTROSE 50 % IN WATER (D50W) INTRAVENOUS SYRINGE 12.5 G: at 20:42

## 2019-04-14 RX ADMIN — LEVETIRACETAM 500 MG: 100 INJECTION, SOLUTION INTRAVENOUS at 09:13

## 2019-04-14 RX ADMIN — DEXTROSE AND SODIUM CHLORIDE: 5; 450 INJECTION, SOLUTION INTRAVENOUS at 16:57

## 2019-04-14 RX ADMIN — VALPROATE SODIUM 190 MG: 100 INJECTION, SOLUTION INTRAVENOUS at 15:58

## 2019-04-14 RX ADMIN — Medication 10 ML: at 07:36

## 2019-04-14 RX ADMIN — VALPROATE SODIUM 190 MG: 100 INJECTION, SOLUTION INTRAVENOUS at 10:11

## 2019-04-14 RX ADMIN — DEXTROSE 50 % IN WATER (D50W) INTRAVENOUS SYRINGE 12.5 G: at 10:53

## 2019-04-14 RX ADMIN — SODIUM CHLORIDE: 9 INJECTION, SOLUTION INTRAVENOUS at 11:43

## 2019-04-14 RX ADMIN — MIDAZOLAM HYDROCHLORIDE 5 MG: 1 INJECTION INTRAMUSCULAR; INTRAVENOUS at 07:35

## 2019-04-14 RX ADMIN — VALPROATE SODIUM 190 MG: 100 INJECTION, SOLUTION INTRAVENOUS at 04:30

## 2019-04-14 RX ADMIN — VALPROATE SODIUM 190 MG: 100 INJECTION, SOLUTION INTRAVENOUS at 21:55

## 2019-04-14 RX ADMIN — MIDAZOLAM 5 MG: 1 INJECTION INTRAMUSCULAR; INTRAVENOUS at 07:35

## 2019-04-14 RX ADMIN — DEXTROSE 50 % IN WATER (D50W) INTRAVENOUS SYRINGE 12.5 G: at 06:23

## 2019-04-14 RX ADMIN — DEXTROSE 50 % IN WATER (D50W) INTRAVENOUS SYRINGE 12.5 G: at 15:19

## 2019-04-14 ASSESSMENT — PAIN SCALES - PAIN ASSESSMENT IN ADVANCED DEMENTIA (PAINAD)
CONSOLABILITY: 1
FACIALEXPRESSION: 0
NEGVOCALIZATION: 1
NEGVOCALIZATION: 1
BREATHING: 0
NEGVOCALIZATION: 0
FACIALEXPRESSION: 0
FACIALEXPRESSION: 0
BODYLANGUAGE: 0
NEGVOCALIZATION: 1
BREATHING: 0
NEGVOCALIZATION: 0
BODYLANGUAGE: 0
CONSOLABILITY: 0
BODYLANGUAGE: 0
BREATHING: 0
NEGVOCALIZATION: 1
CONSOLABILITY: 1
BREATHING: 0
FACIALEXPRESSION: 0
NEGVOCALIZATION: 0
BODYLANGUAGE: 0
TOTALSCORE: 3
BREATHING: 0
TOTALSCORE: 1
FACIALEXPRESSION: 0
FACIALEXPRESSION: 0
NEGVOCALIZATION: 1
FACIALEXPRESSION: 1
BODYLANGUAGE: 0
NEGVOCALIZATION: 1
FACIALEXPRESSION: 0
FACIALEXPRESSION: 0
BODYLANGUAGE: 0
NEGVOCALIZATION: 1
BREATHING: 0
BODYLANGUAGE: 1
TOTALSCORE: 0
FACIALEXPRESSION: 0
CONSOLABILITY: 1
BODYLANGUAGE: 0
CONSOLABILITY: 0
CONSOLABILITY: 0
BODYLANGUAGE: 0
BREATHING: 0
TOTALSCORE: 0
FACIALEXPRESSION: 0
TOTALSCORE: 1
CONSOLABILITY: 0
TOTALSCORE: 1
BREATHING: 0
CONSOLABILITY: 0
TOTALSCORE: 2
BREATHING: 0
CONSOLABILITY: 1
TOTALSCORE: 2
BREATHING: 0
TOTALSCORE: 2
NEGVOCALIZATION: 0
BODYLANGUAGE: 0
CONSOLABILITY: 0
BODYLANGUAGE: 0
TOTALSCORE: 0
TOTALSCORE: 2
CONSOLABILITY: 1
BREATHING: 0

## 2019-04-14 ASSESSMENT — PAIN SCALES - GENERAL
PAINLEVEL_OUTOF10: 1
PAINLEVEL_OUTOF10: 0
PAINLEVEL_OUTOF10: 2
PAINLEVEL_OUTOF10: 0
PAINLEVEL_OUTOF10: 1

## 2019-04-14 NOTE — PROGRESS NOTES
Pt much calmer this evening. Not making as many attempt to get out of bed. Watching TV comfortably and resting intermittently. [employer] called for wheelchair to be brought in when pt is able to use. Will continue to monitor.

## 2019-04-14 NOTE — PROGRESS NOTES
TRANSFER ACCEPTANCE NOTE:    S: The patient was seen and examined. ICU Course:   Pt is a 47 y/o M with PMHx MRDD, seizure disorder, schizophrenia that presents with increased lethargy and decreased appetite. Unable to obtain history from patient due to non-verbal MRDD. Pt had a fall on 4/3/19 and was evaluated in ED and discharged home. Pt appeared more lethargic since discharge from ED, worsening appetite, and new bruising and caregiver brought him back to ED. He was found to have acute left sided 1cm SDH with mild midline shift. Neurosurgery was consulted but do not recommend intervention. Pt's head CT w/o contrast was stable from prior. He had SLP evaluation but is not participating with exam.  He was hypoglycemic and put on D5 1/2NS. Pt also had SAUL which was likely 2/2 poor PO intake, this has resolved. Pt was also noted to have diarrhea prior to admission, and laxatives were held. Vitals:    04/14/19 1419   BP: (!) 115/90   Pulse: 73   Resp: 18   Temp:    SpO2:      Scheduled Meds:   [Held by provider] ARIPiprazole  20 mg Oral Nightly    [Held by provider] carBAMazepine  200 mg Oral BID    [Held by provider] clonazePAM  1 mg Oral Nightly    [Held by provider] lactulose  20 g Oral BID    [Held by provider] levothyroxine  112 mcg Oral Daily    [Held by provider] mirtazapine  30 mg Oral Nightly    [Held by provider] tamsulosin  0.4 mg Oral Daily    sodium chloride flush  10 mL Intravenous 2 times per day    [Held by provider] clonazePAM  0.5 mg Oral BID    levetiracetam  500 mg Intravenous Q12H    valproate sodium (DEPACON) IVPB  190 mg Intravenous Q6H     Continuous Infusions:   dextrose 5 % and 0.2 % NaCl      dextrose       PRN Meds:sodium chloride flush, acetaminophen, magnesium hydroxide, ondansetron, labetalol, glucose, glucagon (rDNA), dextrose, dextrose    Physical Exam   Constitutional: He appears well-developed. No distress.    malnourished   HENT:   Head: Normocephalic course of treatment have been reviewed with the ICU team. The treatment plan has been reviewed with the ICU team. The patient is being transferred to [place] [address] in stable condition. Cesar Nunes M.D.    Internal Medicine Resident, PGY-3  4/14/2019, 3:18 PM

## 2019-04-14 NOTE — PROGRESS NOTES
Internal Medicine PGY- 1 Resident Progress Note    PCP: No primary care provider on file. Date of Admission: 4/13/2019    Chief Complaint: Subdural hematoma    Subjective: Pt is non-verbal at baseline. Unable to obtain hx. Alert, watching TV. Medications:  Reviewed    Infusion Medications    sodium chloride 75 mL/hr at 04/13/19 1251    dextrose       Scheduled Medications    [Held by provider] ARIPiprazole  20 mg Oral Nightly    [Held by provider] carBAMazepine  200 mg Oral BID    [Held by provider] clonazePAM  1 mg Oral Nightly    [Held by provider] lactulose  20 g Oral BID    [Held by provider] levothyroxine  112 mcg Oral Daily    [Held by provider] mirtazapine  30 mg Oral Nightly    [Held by provider] tamsulosin  0.4 mg Oral Daily    sodium chloride flush  10 mL Intravenous 2 times per day    [Held by provider] clonazePAM  0.5 mg Oral BID    levetiracetam  500 mg Intravenous Q12H    valproate sodium (DEPACON) IVPB  190 mg Intravenous Q6H     PRN Meds: sodium chloride flush, acetaminophen, magnesium hydroxide, ondansetron, labetalol, glucose, glucagon (rDNA), dextrose, dextrose      Intake/Output Summary (Last 24 hours) at 4/14/2019 8128  Last data filed at 4/14/2019 0339  Gross per 24 hour   Intake 2583.78 ml   Output --   Net 2583.78 ml       Physical Exam Performed:    /85   Pulse 81   Temp 98 °F (36.7 °C) (Infrared)   Resp 13   Ht 5' 8\" (1.727 m)   Wt 95 lb 7.4 oz (43.3 kg)   SpO2 95%   BMI 14.51 kg/m²     General appearance: Non-verbal at baseline  HEENT: Pupils equal, round, and reactive to light. Conjunctivae/corneas clear. Respiratory: Normal respiratory effort. Clear to auscultation, bilaterally without Rales/Wheezes/Rhonchi. Cardiovascular: Regular rate and rhythm with normal S1/S2 without murmurs, rubs or gallops. Abdomen: Soft, non-tender, non-distended with normal bowel sounds. Musculoskeletal: No clubbing, cyanosis or edema bilaterally.   Full range of motion - Holding home miralax  - Hold home Lactulose 20g BID PO    Dispo - TF to [facility]     DVT Prophylaxis: C/I d/t SDH  Diet: Diet NPO Effective Now  Code Status: Full Code    I will discuss the patient with the senior resident and MD Maximo Dale MD  Internal Medicine Resident PGY-1  Pager: [phone]    Pulmonary/CC     Patient seen and examined. I agree with Dr. Dhiraj Swann history, physical, lab findings, assessment and plan.     Assessment  1. Traumatic SDH - symptomatic worsening of subacute SDH  2. Thrombocytopenia - acute on chronic  3. SAUL - improving  4. MRDD  5. Seizure disorder     Plan  1. Neurosurgery has seen the patient and deemed him best for medical management  2. Okay to transfer to [address] [place] [address]  4. Goal SBP < 160  5. Continue IV Depakote and Keppra  6. No anticoagulants  7. Sitter at bedside  8.   Maintain him by mouth per speech therapy recommendations     Will sign off    Vitor Luis MD

## 2019-04-14 NOTE — PROGRESS NOTES
Hospitalist Progress Note      PCP: No primary care provider on file. Date of Admission: 4/13/2019    Chief Complaint: Lethargy and decreased appetite    Hospital Course: Admitted for SDH. Repeat head CT with stable left SDH with mass effect and midline shift. Slight extension into left para falcine and left tentorial region. Subjective: Unable to obtain, pt non-verbal at baseline. Medications:  Reviewed    Infusion Medications    sodium chloride 75 mL/hr at 04/13/19 1251    dextrose       Scheduled Medications    [Held by provider] ARIPiprazole  20 mg Oral Nightly    [Held by provider] carBAMazepine  200 mg Oral BID    [Held by provider] clonazePAM  1 mg Oral Nightly    [Held by provider] lactulose  20 g Oral BID    [Held by provider] levothyroxine  112 mcg Oral Daily    [Held by provider] mirtazapine  30 mg Oral Nightly    [Held by provider] tamsulosin  0.4 mg Oral Daily    sodium chloride flush  10 mL Intravenous 2 times per day    [Held by provider] clonazePAM  0.5 mg Oral BID    levetiracetam  500 mg Intravenous Q12H    valproate sodium (DEPACON) IVPB  190 mg Intravenous Q6H     PRN Meds: sodium chloride flush, acetaminophen, magnesium hydroxide, ondansetron, labetalol, glucose, glucagon (rDNA), dextrose, dextrose      Intake/Output Summary (Last 24 hours) at 4/14/2019 0819  Last data filed at 4/14/2019 0339  Gross per 24 hour   Intake 2583.78 ml   Output --   Net 2583.78 ml       Physical Exam Performed:    BP 92/65   Pulse 71   Temp 98 °F (36.7 °C) (Infrared)   Resp 15   Ht 5' 8\" (1.727 m)   Wt 95 lb 7.4 oz (43.3 kg)   SpO2 91%   BMI 14.51 kg/m²     General appearance: No apparent distress, appears stated age and cooperative. HEENT: Pupils equal, round, and reactive to light. Conjunctivae/corneas clear. Neck: Supple, with full range of motion. No jugular venous distention. Trachea midline. Respiratory:  Normal respiratory effort.  Clear to auscultation, bilaterally without Rales/Wheezes/Rhonchi. Cardiovascular: Regular rate and rhythm with normal S1/S2 without murmurs, rubs or gallops. Abdomen: Soft, non-tender, non-distended with normal bowel sounds. Musculoskeletal: No clubbing, cyanosis or edema bilaterally. Full range of motion without deformity. Skin: Abrasions and bruises with dressing  Neurologic:  Neurovascularly intact without any focal sensory/motor deficits. Cranial nerves: II-XII intact, grossly non-focal. Non-verbal  Psychiatric: Alert  Capillary Refill: Brisk,< 3 seconds   Peripheral Pulses: +2 palpable, equal bilaterally       Labs:   Recent Labs     04/12/19  1457 04/13/19  0435 04/14/19  0420   WBC 5.2 8.7 8.1   HGB 12.9* 12.2* 12.1*   HCT 38.2* 36.4* 36.0*   PLT 95* 56* 131*     Recent Labs     04/12/19  1457 04/13/19  0434 04/14/19  0420    145 143   K 4.6 4.6 4.6   CL 99 103 102   CO2 32 29 29   BUN 48* 53* 47*   CREATININE 1.4* 1.1 1.0   CALCIUM 9.4 9.4 9.4     Recent Labs     04/12/19  1457   AST 58*   ALT 67*   BILITOT <0.2   ALKPHOS 132*     Recent Labs     04/12/19  1934   INR 1.05     No results for input(s): Jasmin Ortega in the last 72 hours. Urinalysis:      Lab Results   Component Value Date    NITRU Negative 04/12/2019    WBCUA 6 04/12/2019    RBCUA 18 04/12/2019    BLOODU SMALL 04/12/2019    SPECGRAV 1.025 04/12/2019    GLUCOSEU Negative 04/12/2019       Radiology:  CT head without contrast   Final Result     No change          CT HEAD WO CONTRAST    (Results Pending)           Assessment:  Traumatic SDH  MRDD  Seizure disorder  Acute on chronic throbocytopenia  SAUL  Hypoglycemia  Severe malnutrition    Plan:  Cont keppra.  Cont oral AED's after swallow eval  Monitor plt's, transfuse if <80,000  Monitor creatinine  Cont IVF  Monitor BG   Speech eval    DVT Prophylaxis: On hold d/t SDH  Diet: Diet NPO Effective Now  Code Status: Full Code    PT/OT Eval Status: Ordered    Dispo - Anticipate discharge in 1-2 days    Aura Hodan Mathews MD

## 2019-04-14 NOTE — PROGRESS NOTES
Speech Language Pathology  Facility/Department: [facility] ICU   BEDSIDE SWALLOW EVALUATION    NAME: Josefina Henderson  : 1970  MRN: [id number]    ADMISSION DATE: 2019  ADMITTING DIAGNOSIS: has Subdural hematoma (HCC) and Severe malnutrition ([address] on their problem list.    ONSET DATE: 19  Recent Chest Xray/CT of Chest: (19)  No acute cardiopulmonary process identified. Date of Eval: 2019  Evaluating Therapist: Karine Garcia    Current Diet level:  Current Diet : NPO (Per report was receiving puree at facility.)  Current Liquid Diet : NPO (Per report was receiving thin liquids at facility.)    Primary Complaint  Patient Complaint: unable to state or indicate    Pain:  Pain Assessment  Pain Assessment: Advanced Dementia  Pain Level: 0  Patient's Stated Pain Goal: Other (Comment)(pt is unable to state)  PAINAD (Pain Assessment in Advance Dementia)  Breathing: normal  Negative Vocalization: none  Facial Expression: smiling or inexpressive  Body Language: relaxed  Consolability: no need to console  PAINAD Score: 0    Reason for Referral  Josefina Henderson was referred for a bedside swallow evaluation to assess the efficiency of his swallow function, identify signs and symptoms of aspiration and make recommendations regarding safe dietary consistencies, effective compensatory strategies, and safe eating environment. Per H&P, pt (with history of severe MRDD, hypothyroidism, seizures, insomnia, schizophrenia, GERD, BPH, dysphagia) \"had a fall on 4/3 where he was evaluated in ED and found to have a stable very small SDH and was discharged home. Patient transferred in from [facility] with SDH with midline shift, worsened since 4/3/19. .. Baseline pt is non-verbal, does not follow commands. Always tries to get out of bed and walk around (unaware that he needs walker). Walks with walker for short distances (bathroom), but usually is wheelchair dependent.  Dysphagia, eats pureed foods and can drink thin liquids through straw. Takes medicines with apple sauce. \"     Per Dietary assessment, pt \"meets ASPEN criteria for severe malnutrition. Aggressive nutrition intervention should be considered, up to and including alternative means of nutrition/hydration, if nutrition status can not improve. \"      Impression  Dysphagia Diagnosis: Severe oral stage dysphagia; Severe pharyngeal stage dysphagia  Dysphagia Impression:  Pt is alert but nonverbal and only able to follow few commands. RN reports that pt demonstrates severe oral stage deficits characterized by inadequate labial seal (mod-severe labial loss with thin liquids via teaspoon and able to close lips around straw but unable to achieve suction) and suspected poor AP propulsion. Pt also demonstrates severely delayed swallow initiation (>20 seconds) with thin liquids via teaspoon, which puts pt at risk for aspiration before swallow. He does not benefit from verbal or tactile cues; on 1/2 trials he does initiate swallow following an audible model. No further trials presented at this time due to concerns for pt safety. Pt not deemed able to tolerate PO safely or efficiently. Recommend NPO and reassess. Dysphagia Outcome Severity Scale: Level 1: Severe dysphagia- NPO. Unable to tolerate any PO safely     Treatment Plan  Requires SLP Intervention: Yes  Duration/Frequency of Treatment: 3-5x/week  D/C Recommendations: SNF    Recommended Diet and Intervention  Diet Solids Recommendation: NPO  Liquid Consistency Recommendation: NPO  Recommended Form of Meds: Via alternative means of nutrition  Recommendations: NPO; Dysphagia treatment; aggressive oral care  Therapeutic Interventions: Oral care; Therapeutic PO trials with SLP    Compensatory Swallowing Strategies  Aggressive Oral Care    Treatment/Goals  Dysphagia Goals:   1. The patient will tolerate repeat BSE when able. General  Chart Reviewed: Yes  Behavior/Cognition: Alert; Cooperative  Temperature Spikes Noted: No (per flowsheet)  Respiratory Status: Room air  Breath Sounds: Rhonchi (Pe RN flowsheet)  O2 Device: None (Room air)  Communication Observation: Non-verbal (nonverbal at baseline)  Follows Directions: (not consistently)  Dentition: Edentulous  Patient Positioning: Partially reclined;Upright in bed(poor position due to body posture)  Volitional Swallow: Absent (unable to identify if absent swallow of if pt unable to comrehend directions)  Prior Dysphagia History: No documented dysphagia evaluation or treatment, but Dysphagia I (puree) diet provided at facility. Consistencies Administered: Thin - teaspoon (attempted thin via straw but pt was unable to suck)    Vision/Hearing  Vision  Vision: (unable to assess at this time)  Hearing  Hearing: (unable to assess at this time)    Oral Motor Deficits  Oral/Motor  Oral Motor: (unable to assess at this time)    Oral Phase Dysfunction  Oral Phase  Oral Phase: Exceptions  Oral Phase Dysfunction  Spillage Right: Thin - teaspoon  Suspected Premature Bolus Loss: Thin - teaspoon  Oral Phase  Oral Phase - Comment: Pt demonstrates severe oral stage deficits characterized by inadequate labial seal (mod-severe labial loss with thin liquids via teaspoon and able to close lips around straw but unable to achieve suction). Indicators of Pharyngeal Phase Dysfunction  Pharyngeal Phase  Pharyngeal Phase: Exceptions  Indicators of Pharyngeal Phase Dysfunction  Delayed Swallow: Thin - teaspoon  Pharyngeal Phase   Pharyngeal: Pt also demonstrated severely delayed swallow initiation with thin liquids via teaspoon. No further trials presented at this time due to concerns for pt safety.       Prognosis  Prognosis  Prognosis for safe diet advancement: fair  Individuals consulted  Consulted and agree with results and recommendations: Patient;RN    Education  Patient Education: SLP educated pt and RN re: role of SLP, assessment procedure, and POC recommendations; RN verbalized comprehension but pt demonstrates no evidence of learning (consistent with MRDD diagnosis). Patient Education Response: No evidence of learning  Safety Devices in place: Yes  Type of devices: All fall risk precautions in place     Therapy Time  SLP Individual Minutes  Time In: 1030  Time Out: 1100  Minutes: 30          Plan  Diet Recommendations:  NPO  Discharge Plan:  TBD  Discussed with RNSanna. Needs within reach. Electronically Signed by:  Pamela Moffett., [address] [id number]  Pager #[phone]    This document will serve as a discharge summary if pt discharges before next treatment.    4/14/2019 11:08 AM

## 2019-04-14 NOTE — PLAN OF CARE
incontinent at baseline. Incontinent briefs in use. Jeanne care provided at time of soiling. Pt moves self frequently in bed. Will continue to monitor. 4/13/2019 2041 by Mikal Wiggins RN  Outcome: Ongoing     Problem: Pain:  Goal: Pain level will decrease  Description  Patient's pain assessed every shift, post procedure, prn, or any changes in status. Pt's pain is assessed using Advanced Dementia pain scale. . Pt's pain has been controlled this shift. Outcome: Ongoing  Goal: Control of acute pain  Description  Control of acute pain  4/14/2019 0852 by Dimas Farnsworth RN  Outcome: Ongoing  Note:   Advanced dementia pain scale in use this shift. Pt is currently sedated from CT. Will continue to monitor for pain and provide non pharmacological pain relief per order. 4/13/2019 2041 by Mikal Wiggins RN  Outcome: Ongoing  Goal: Control of chronic pain  Description  Control of chronic pain  Outcome: Ongoing     Problem: Nutrition  Goal: Optimal nutrition therapy  Description  Nutrition Problem: Severe malnutrition  Intervention: Food and/or Nutrient Delivery: Continue NPO, Start oral diet  Nutritional Goals: Pt will tolerate most appropriate form of nutrition therapy when initiated     Outcome: Ongoing  Note:   Pt with history of dysphagia, pureed foods and thins per caregiver. Pt eagerly opens mouth to accept spoonful of pureed texture food and thin liquids, but holds food in mouth without swallowing. SLP ordered. Pt is cachectic with poor skin turgor. RD consult in place, please see RD notes. Will continue to collaborate with SLP and RDs. Problem: HEMODYNAMIC STATUS  Goal: Patient has stable vital signs and fluid balance  Outcome: Ongoing  Intervention: Blood pressure monitoring  Note:   Pt VSS with exception of post- versed administration where SBP 70's x1hr. ICU residents notified via secure messaging. Please see flowsheets.         Problem: ACTIVITY INTOLERANCE/IMPAIRED MOBILITY  Goal: Mobility/activity is maintained at optimum level for patient  Outcome: Ongoing  Note:   Per Barbra Wahl, bandar RN, patient showed interest wheelchair upon caregiver bringing it to the pt room and appeared to enjoy wheeling self around ICU in wheelchair with RN assist. Orders in place for OT. Pt currently asleep, plan to transfer patient to wheelchair when alert. Will continue to monitor.

## 2019-04-14 NOTE — PROGRESS NOTES
Pt transported to CT on monitor, IVF infusing via L arm PIV per order, on 4L NC cannula, pt eyes closed. SBP 70's, secure message sent to ICU residents to notify of hypotension post Versed administration (Please see eMAR). Pt awakened on transfer to CT table, required this RN to stay with him during CT scan to remain calm and still. Pt tolerated well with RN assist.   Pt returned to ICU room 4508, on monitor, IVF infusing per order, NC as above. VSS, /76  Pt repositioned in bed. Warm blanket provided. Pt covered head with blanket and appears to be asleep. This RN remains 1:1 for patient safety.

## 2019-04-14 NOTE — PLAN OF CARE
Problem: Falls - Risk of:  Goal: Will remain free from falls  Description  Patient has been free from falls and injuries this shift. Lauren fall risk assessed each shift. Bed locked in lowest position, alarm engaged. Fall bracelet in place, fall sign present outside door, fall socks present on patient. Room free of clutter. Will continue to round and assess needs. Pt receiving one on one pt care due to decreased neurologic state and continuing to make attempts at getting out of bed.      4/13/2019 2041 by Mikal Wiggins RN  Outcome: Ongoing     Problem: Pain:  Goal: Control of acute pain  Description  Control of acute pain  4/13/2019 2041 by Mikal Wiggins RN  Outcome: Ongoing  4/13/2019 1314 by Dimas Farnsworth RN  Outcome: Ongoing  Note:   Advanced dementia scale in use for pain assessment. Pt is NPO, unable to swallow pain medications. Non-pharmacological pain relief methods utilized. Will continue to monitor for pain and advocate for appropriate pain control. Pt seems to resting comfortably at this time. Problem: Risk for Impaired Skin Integrity  Goal: Tissue integrity - skin and mucous membranes  Description  Patient's skin assessed q4h hours and prn. Patient turns self frequently. Skin is kept clean and dry. Preventative dressings have been applied. Sacral heart  assessed to be CDI. Charlie scale is assessed and documented each shift. Patient's skin integrity maintained throughout shift. Patient has multiple skin integrity issues upon admission, his shins were covered with open abrasions, redness and bruising. Scattered bruising on upper extremities and throughout body. Patient has bruising and stiches to the L eyebrow due to recent fall as reported by caregiver. Patients buttocks looks red but blanchable. Overall patient looks to have a very fragile skin and structure.       4/13/2019 2041 by Mikal Wiggins RN  Outcome: Ongoing

## 2019-04-15 ENCOUNTER — APPOINTMENT (OUTPATIENT)
Dept: GENERAL RADIOLOGY | Age: 49
DRG: 082 | End: 2019-04-15
Attending: FAMILY MEDICINE
Payer: MEDICARE

## 2019-04-15 LAB
ALBUMIN SERPL-MCNC: 3 G/DL (ref 3.4–5)
ANION GAP SERPL CALCULATED.3IONS-SCNC: 12 MMOL/L (ref 3–16)
BETA-HYDROXYBUTYRATE: 0.08 MMOL/L (ref 0–0.27)
BUN BLDV-MCNC: 39 MG/DL (ref 7–20)
CALCIUM SERPL-MCNC: 9.6 MG/DL (ref 8.3–10.6)
CHLORIDE BLD-SCNC: 101 MMOL/L (ref 99–110)
CO2: 28 MMOL/L (ref 21–32)
CORTISOL TOTAL: 31.3 UG/DL
CREAT SERPL-MCNC: 0.9 MG/DL (ref 0.9–1.3)
GFR AFRICAN AMERICAN: >60
GFR NON-AFRICAN AMERICAN: >60
GLUCOSE BLD-MCNC: 47 MG/DL (ref 70–99)
GLUCOSE BLD-MCNC: 51 MG/DL (ref 70–99)
GLUCOSE BLD-MCNC: 51 MG/DL (ref 70–99)
GLUCOSE BLD-MCNC: 55 MG/DL (ref 70–99)
GLUCOSE BLD-MCNC: 82 MG/DL (ref 70–99)
GLUCOSE BLD-MCNC: 83 MG/DL (ref 70–99)
GLUCOSE BLD-MCNC: 83 MG/DL (ref 70–99)
GLUCOSE BLD-MCNC: 84 MG/DL (ref 70–99)
GLUCOSE BLD-MCNC: 88 MG/DL (ref 70–99)
GLUCOSE BLD-MCNC: 90 MG/DL (ref 70–99)
GLUCOSE BLD-MCNC: 91 MG/DL (ref 70–99)
GLUCOSE BLD-MCNC: 93 MG/DL (ref 70–99)
GLUCOSE BLD-MCNC: 95 MG/DL (ref 70–99)
HCT VFR BLD CALC: 39.4 % (ref 40.5–52.5)
HEMOGLOBIN: 12.6 G/DL (ref 13.5–17.5)
MCH RBC QN AUTO: 31.2 PG (ref 26–34)
MCHC RBC AUTO-ENTMCNC: 32 G/DL (ref 31–36)
MCV RBC AUTO: 97.5 FL (ref 80–100)
PDW BLD-RTO: 20.3 % (ref 12.4–15.4)
PERFORMED ON: ABNORMAL
PERFORMED ON: NORMAL
PHOSPHORUS: 3.4 MG/DL (ref 2.5–4.9)
PLATELET # BLD: 106 K/UL (ref 135–450)
PMV BLD AUTO: 9.8 FL (ref 5–10.5)
POTASSIUM SERPL-SCNC: 4.3 MMOL/L (ref 3.5–5.1)
RBC # BLD: 4.05 M/UL (ref 4.2–5.9)
SODIUM BLD-SCNC: 141 MMOL/L (ref 136–145)
T4 FREE: 1 NG/DL (ref 0.9–1.8)
TSH REFLEX: 6.51 UIU/ML (ref 0.27–4.2)
WBC # BLD: 8.1 K/UL (ref 4–11)

## 2019-04-15 PROCEDURE — G8978 MOBILITY CURRENT STATUS: HCPCS

## 2019-04-15 PROCEDURE — 97530 THERAPEUTIC ACTIVITIES: CPT

## 2019-04-15 PROCEDURE — 2580000003 HC RX 258: Performed by: STUDENT IN AN ORGANIZED HEALTH CARE EDUCATION/TRAINING PROGRAM

## 2019-04-15 PROCEDURE — 85027 COMPLETE CBC AUTOMATED: CPT

## 2019-04-15 PROCEDURE — 1200000000 HC SEMI PRIVATE

## 2019-04-15 PROCEDURE — G8980 MOBILITY D/C STATUS: HCPCS

## 2019-04-15 PROCEDURE — 83525 ASSAY OF INSULIN: CPT

## 2019-04-15 PROCEDURE — 74230 X-RAY XM SWLNG FUNCJ C+: CPT

## 2019-04-15 PROCEDURE — 84681 ASSAY OF C-PEPTIDE: CPT

## 2019-04-15 PROCEDURE — 6360000002 HC RX W HCPCS: Performed by: STUDENT IN AN ORGANIZED HEALTH CARE EDUCATION/TRAINING PROGRAM

## 2019-04-15 PROCEDURE — 83036 HEMOGLOBIN GLYCOSYLATED A1C: CPT

## 2019-04-15 PROCEDURE — G0480 DRUG TEST DEF 1-7 CLASSES: HCPCS

## 2019-04-15 PROCEDURE — 99221 1ST HOSP IP/OBS SF/LOW 40: CPT | Performed by: NURSE PRACTITIONER

## 2019-04-15 PROCEDURE — 92526 ORAL FUNCTION THERAPY: CPT

## 2019-04-15 PROCEDURE — 84439 ASSAY OF FREE THYROXINE: CPT

## 2019-04-15 PROCEDURE — 6370000000 HC RX 637 (ALT 250 FOR IP): Performed by: STUDENT IN AN ORGANIZED HEALTH CARE EDUCATION/TRAINING PROGRAM

## 2019-04-15 PROCEDURE — 82533 TOTAL CORTISOL: CPT

## 2019-04-15 PROCEDURE — 2580000003 HC RX 258

## 2019-04-15 PROCEDURE — 97161 PT EVAL LOW COMPLEX 20 MIN: CPT

## 2019-04-15 PROCEDURE — 2500000003 HC RX 250 WO HCPCS: Performed by: STUDENT IN AN ORGANIZED HEALTH CARE EDUCATION/TRAINING PROGRAM

## 2019-04-15 PROCEDURE — 87324 CLOSTRIDIUM AG IA: CPT

## 2019-04-15 PROCEDURE — 92611 MOTION FLUOROSCOPY/SWALLOW: CPT

## 2019-04-15 PROCEDURE — 84443 ASSAY THYROID STIM HORMONE: CPT

## 2019-04-15 PROCEDURE — 87449 NOS EACH ORGANISM AG IA: CPT

## 2019-04-15 PROCEDURE — 82010 KETONE BODYS QUAN: CPT

## 2019-04-15 PROCEDURE — 80069 RENAL FUNCTION PANEL: CPT

## 2019-04-15 PROCEDURE — 97165 OT EVAL LOW COMPLEX 30 MIN: CPT

## 2019-04-15 PROCEDURE — 36415 COLL VENOUS BLD VENIPUNCTURE: CPT

## 2019-04-15 RX ORDER — DIVALPROEX SODIUM 125 MG/1
250 CAPSULE, COATED PELLETS ORAL 2 TIMES DAILY
Status: DISCONTINUED | OUTPATIENT
Start: 2019-04-15 | End: 2019-04-17

## 2019-04-15 RX ORDER — LORAZEPAM 2 MG/ML
1 INJECTION INTRAMUSCULAR
Status: DISCONTINUED | OUTPATIENT
Start: 2019-04-15 | End: 2019-04-19 | Stop reason: HOSPADM

## 2019-04-15 RX ORDER — SODIUM CHLORIDE 9 MG/ML
INJECTION, SOLUTION INTRAVENOUS
Status: COMPLETED
Start: 2019-04-15 | End: 2019-04-15

## 2019-04-15 RX ORDER — LEVETIRACETAM 500 MG/1
500 TABLET ORAL 2 TIMES DAILY
Status: DISCONTINUED | OUTPATIENT
Start: 2019-04-15 | End: 2019-04-17

## 2019-04-15 RX ADMIN — VALPROATE SODIUM 190 MG: 100 INJECTION, SOLUTION INTRAVENOUS at 03:45

## 2019-04-15 RX ADMIN — LEVETIRACETAM 500 MG: 500 TABLET ORAL at 20:56

## 2019-04-15 RX ADMIN — DEXTROSE 50 % IN WATER (D50W) INTRAVENOUS SYRINGE 12.5 G: at 00:01

## 2019-04-15 RX ADMIN — DIVALPROEX SODIUM 250 MG: 125 CAPSULE, COATED PELLETS ORAL at 14:08

## 2019-04-15 RX ADMIN — DEXTROSE 50 % IN WATER (D50W) INTRAVENOUS SYRINGE 12.5 G: at 02:50

## 2019-04-15 RX ADMIN — DEXTROSE 50 % IN WATER (D50W) INTRAVENOUS SYRINGE 12.5 G: at 08:10

## 2019-04-15 RX ADMIN — SODIUM CHLORIDE 500 ML: 9 INJECTION, SOLUTION INTRAVENOUS at 03:44

## 2019-04-15 RX ADMIN — CARBAMAZEPINE 200 MG: 200 TABLET ORAL at 20:56

## 2019-04-15 RX ADMIN — ENOXAPARIN SODIUM 40 MG: 40 INJECTION SUBCUTANEOUS at 14:08

## 2019-04-15 RX ADMIN — CLONAZEPAM 1 MG: 1 TABLET ORAL at 21:30

## 2019-04-15 RX ADMIN — VALPROATE SODIUM 190 MG: 100 INJECTION, SOLUTION INTRAVENOUS at 11:42

## 2019-04-15 RX ADMIN — ARIPIPRAZOLE 20 MG: 5 TABLET ORAL at 20:56

## 2019-04-15 RX ADMIN — Medication 10 ML: at 20:56

## 2019-04-15 RX ADMIN — DIVALPROEX SODIUM 250 MG: 125 CAPSULE, COATED PELLETS ORAL at 20:55

## 2019-04-15 RX ADMIN — Medication 10 ML: at 08:11

## 2019-04-15 RX ADMIN — MIRTAZAPINE 30 MG: 15 TABLET, FILM COATED ORAL at 20:55

## 2019-04-15 RX ADMIN — LEVETIRACETAM 500 MG: 500 TABLET ORAL at 14:08

## 2019-04-15 RX ADMIN — DEXTROSE AND SODIUM CHLORIDE: 5; 450 INJECTION, SOLUTION INTRAVENOUS at 11:44

## 2019-04-15 RX ADMIN — LEVETIRACETAM 500 MG: 100 INJECTION, SOLUTION INTRAVENOUS at 09:32

## 2019-04-15 ASSESSMENT — PAIN SCALES - PAIN ASSESSMENT IN ADVANCED DEMENTIA (PAINAD)
BODYLANGUAGE: 0
FACIALEXPRESSION: 0
FACIALEXPRESSION: 0
NEGVOCALIZATION: 0
BREATHING: 0
FACIALEXPRESSION: 0
FACIALEXPRESSION: 0
BODYLANGUAGE: 0
BREATHING: 0
NEGVOCALIZATION: 0
CONSOLABILITY: 0
TOTALSCORE: 0
NEGVOCALIZATION: 0
CONSOLABILITY: 0
TOTALSCORE: 0
NEGVOCALIZATION: 0
BREATHING: 0
TOTALSCORE: 0
BODYLANGUAGE: 0
BODYLANGUAGE: 0
FACIALEXPRESSION: 0
BODYLANGUAGE: 0
BREATHING: 0
CONSOLABILITY: 0
TOTALSCORE: 0
TOTALSCORE: 0
BREATHING: 0
NEGVOCALIZATION: 0

## 2019-04-15 ASSESSMENT — PAIN SCALES - GENERAL
PAINLEVEL_OUTOF10: 0

## 2019-04-15 NOTE — PROGRESS NOTES
Pt hypoglycemic a third time and again given PRN 12.5g dextrose (and responded with a blood glucose of 83). Dr. Addi Tirado notified of continually dropping sugars. D5 1/2 NS infusion increased to 150 mL/hr and stat Renal Panel collected, as ordered.

## 2019-04-15 NOTE — PROGRESS NOTES
Speech Language Pathology  [facility]  Dysphagia Daily Treatment Note    NAME: Manuelito Negron  : 1970  MRN: [id number]    Patient Diagnosis(es):   Patient Active Problem List    Diagnosis Date Noted    Severe malnutrition ([facility] [address] 2019    Subdural hematoma ([facility] [address] 2019     Allergies: Not on File    Recent Chest Xray/CT of Chest: (19)  No acute cardiopulmonary process identified.       Previous MBS - none located    Chart reviewed. Medical Diagnosis: SDH  Treatment Diagnosis: dysphagia     BSE Impression 19   Pt is alert but nonverbal and only able to follow few commands. RN reports that pt demonstrates severe oral stage deficits characterized by inadequate labial seal (mod-severe labial loss with thin liquids via teaspoon and able to close lips around straw but unable to achieve suction) and suspected poor AP propulsion. Pt also demonstrates severely delayed swallow initiation (>20 seconds) with thin liquids via teaspoon, which puts pt at risk for aspiration before swallow. He does not benefit from verbal or tactile cues; on 1/2 trials he does initiate swallow following an audible model. No further trials presented at this time due to concerns for pt safety. Pt not deemed able to tolerate PO safely or efficiently. Recommend NPO and reassess. MBS results - scheduled this date 4/15    Pain: none indicated through any means    Current Diet : NPO-     Treatment:  Pt seen bedside to address the following goals:  1- The patient will tolerate repeat BSE when able. 4/15:  Pt alert, non verbal at baseline. Pt opens mouth when he sees the spoon or cup. Pt presented with trials of ice chips, water via straw, nectar thick water via tsp/straw and applesauce. Pt with audible swallow, swallow appears to trigger very quickly and intermittent cough across all consistencies.   Extra dry swallows were noted after PO presentations, which may be indicative of pharyngeal residue. Recommend MBS to fully analyze swallow function. Rn in agreement  Goal met    New goal:  2-Pt will participate in MBS    Patient/Family/Caregiver Education:  Pt educated to reason for visit, pt does not demonstrate understanding    Compensatory Strategies:   TBD after MBS      Plan:  Continued daily Dysphagia treatment with goals per  plan of care. MBS today  Diet recommendations:  TBD after MBS  DC recommendation: TBD  Treatment: 13  D/W nursing [facility]  Needs met prior to leaving room, call button in reach. Carmine Nunn M.S./[facility]-SLP #[id number]  Pg.  # [phone]    If patient is discharged prior to next treatment, this note will serve as the discharge summary

## 2019-04-15 NOTE — PROGRESS NOTES
Internal Medicine PGY-1 Resident Progress Note        PCP: No primary care provider on file. Date of Admission: 4/13/2019    Chief Complaint: Altered mental status; lethargy     Hospital Course (per HPI): Jim Madrigal a 46 y. o. male w/ PMH severe MRDD, seizures, schizophrenia, hypothyroidism, BPH who presents with increased lethargy, decreased appetite and new bruises that found was admitted for symptomatic worsening of subacute left hemispheric subdural hematoma (CT Head 4/12 acute L sided 1 cm SDH with mild midline shift). Etiology was unclear as history is limited from patient's severe MRDD, but was likely trauma secondary to another fall (recent SDH 2/2 fall on 4/3). Subjective: Patient was seen and examined this AM. No acute events overnight. Appears to be at baseline mental status. He is alert and moving extremities spontaneously. Patient was seen by speech and language pathology this morning (recommend advancing diet to Dysphagia I Pureed; honey thick). Patient continues to be hypoglycemic; will evaluate further.      Medications:  Reviewed    Infusion Medications    dextrose 5 % and 0.45 % NaCl 150 mL/hr at 04/15/19 1144    dextrose       Scheduled Medications    [Held by provider] ARIPiprazole  20 mg Oral Nightly    [Held by provider] carBAMazepine  200 mg Oral BID    [Held by provider] clonazePAM  1 mg Oral Nightly    [Held by provider] lactulose  20 g Oral BID    [Held by provider] levothyroxine  112 mcg Oral Daily    [Held by provider] mirtazapine  30 mg Oral Nightly    [Held by provider] tamsulosin  0.4 mg Oral Daily    sodium chloride flush  10 mL Intravenous 2 times per day    [Held by provider] clonazePAM  0.5 mg Oral BID    levetiracetam  500 mg Intravenous Q12H    valproate sodium (DEPACON) IVPB  190 mg Intravenous Q6H     PRN Meds: LORazepam, sodium chloride flush, acetaminophen, magnesium hydroxide, ondansetron, labetalol, glucose, glucagon (rDNA), dextrose, dextrose      Intake/Output Summary (Last 24 hours) at 4/15/2019 1301  Last data filed at 4/15/2019 0653  Gross per 24 hour   Intake 2918.93 ml   Output --   Net 2918.93 ml       Physical Exam Performed:    BP (!) 140/87   Pulse 53   Temp 98 °F (36.7 °C)   Resp 14   Ht 5' 8\" (1.727 m)   Wt 95 lb 0.3 oz (43.1 kg)   SpO2 97%   BMI 14.45 kg/m²     General appearance: Non-verbal at baseline  HEENT: Pupils equal, round, and reactive to light. Conjunctivae/corneas clear. Respiratory: Normal respiratory effort. Clear to auscultation, bilaterally without Rales/Wheezes/Rhonchi. Cardiovascular: Regular rate and rhythm with normal S1/S2 without murmurs, rubs or gallops. Abdomen: Soft, non-tender, non-distended with normal bowel sounds. Musculoskeletal: No clubbing, cyanosis or edema bilaterally. Full range of motion without deformity. Skin: Abrasions present  Neurologic:  Limited exam due to mental status. Able to spontaeously move UE and LE. Peripheral Pulses: +2 palpable, equal bilaterally     Labs:   Recent Labs     04/13/19  0435 04/14/19  0420 04/15/19  0425   WBC 8.7 8.1 8.1   HGB 12.2* 12.1* 12.6*   HCT 36.4* 36.0* 39.4*   PLT 56* 131* 106*     Recent Labs     04/13/19  0434 04/14/19  0420 04/15/19  0425    143 141   K 4.6 4.6 4.3    102 101   CO2 29 29 28   BUN 53* 47* 39*   CREATININE 1.1 1.0 0.9   CALCIUM 9.4 9.4 9.6   PHOS  --   --  3.4     Recent Labs     04/12/19  1457   AST 58*   ALT 67*   BILITOT <0.2   ALKPHOS 132*     Recent Labs     04/12/19  1934   INR 1.05     No results for input(s): Harry Specking in the last 72 hours. Urinalysis:      Lab Results   Component Value Date    NITRU Negative 04/12/2019    WBCUA 6 04/12/2019    RBCUA 18 04/12/2019    BLOODU SMALL 04/12/2019    SPECGRAV 1.025 04/12/2019    GLUCOSEU Negative 04/12/2019       Radiology:  FL MODIFIED BARIUM SWALLOW W VIDEO   Final Result      Penetration and aspiration with nectar.  Refer to speech pathologist note

## 2019-04-15 NOTE — PROGRESS NOTES
Pt hypoglycemic second time this shift; blood glucose 51 at 2356. Pt given 12.5g dextrose 50% PRN. Blood glucose 82 on recheck.

## 2019-04-15 NOTE — CONSULTS
seizures, schizophrenia, hypothyroidism, BPH who presented with lethargy, decreased appetite, and new bruises. Pt had a fall on 4/3 where he was evaluated in ED and found to have a stable very small SDH and was discharged home. He was brought back to the hospital 4/12 and found to have acute left sided 1 cm SDH with mild midline shift. Neurosurgery did not recommend intervention. Pt is nonverbal and does not follow commands at baseline. Subjective:                     Past Medical History:        Diagnosis Date    BPH (benign prostatic hyperplasia)     per caregiver    Developmental delay     Dysphagia     per caregiver    GERD (gastroesophageal reflux disease)     per caregiver    Hypertension     per caregiver    Hypothyroid     per caregiver    Meningitis 2014    Mental retardation     Nonverbal     per caregiver    Schizophrenia [facility])     per caregiver    Seizure disorder [facility])     per caregiver       Past Surgical History:    No past surgical history on file.     Current Medications:    Current Facility-Administered Medications: dextrose 5 % and 0.45 % sodium chloride infusion, , Intravenous, Continuous  [Held by provider] ARIPiprazole (ABILIFY) tablet 20 mg, 20 mg, Oral, Nightly  [Held by provider] carBAMazepine (TEGRETOL) tablet 200 mg, 200 mg, Oral, BID  [Held by provider] clonazePAM (KLONOPIN) tablet 1 mg, 1 mg, Oral, Nightly  [Held by provider] lactulose (CHRONULAC) 10 GM/15ML solution 20 g, 20 g, Oral, BID  [Held by provider] levothyroxine (SYNTHROID) tablet 112 mcg, 112 mcg, Oral, Daily  [Held by provider] mirtazapine (REMERON) tablet 30 mg, 30 mg, Oral, Nightly  [Held by provider] tamsulosin (FLOMAX) capsule 0.4 mg, 0.4 mg, Oral, Daily  sodium chloride flush 0.9 % injection 10 mL, 10 mL, Intravenous, 2 times per day  sodium chloride flush 0.9 % injection 10 mL, 10 mL, Intravenous, PRN  acetaminophen (TYLENOL) tablet 650 mg, 650 mg, Oral, Q4H PRN  magnesium hydroxide (MILK OF MAGNESIA) 400 MG/5ML suspension 30 mL, 30 mL, Oral, Daily PRN  ondansetron (ZOFRAN) injection 4 mg, 4 mg, Intravenous, Q6H PRN  labetalol (NORMODYNE;TRANDATE) injection 10 mg, 10 mg, Intravenous, Q6H PRN  [Held by provider] clonazePAM (KLONOPIN) tablet 0.5 mg, 0.5 mg, Oral, BID  levETIRAcetam (KEPPRA) 500 mg in sodium chloride 0.9 % 100 mL IVPB, 500 mg, Intravenous, Q12H  valproate (DEPACON) 190 mg in dextrose 5 % 100 mL IVPB, 190 mg, Intravenous, Q6H  glucose (GLUTOSE) 40 % oral gel 15 g, 15 g, Oral, PRN  glucagon (rDNA) injection 1 mg, 1 mg, Intramuscular, PRN  dextrose 50 % solution 12.5 g, 12.5 g, Intravenous, PRN  dextrose 5 % solution, 100 mL/hr, Intravenous, PRN    Allergies:  Patient has no allergy information on record. Social History:    Patient currently lives in a group home    Review of Systems -   Unable to obtain due to mental status    Objective:        PHYSICAL EXAM:    General appearance: alert, appears older than stated age and cooperative  Lungs: clear to auscultation bilaterally  Heart: regular rate and rhythm  Abdomen: soft, non-tender; bowel sounds normal; no masses,  no organomegaly  Extremities: extremities normal, atraumatic, no cyanosis or edema  Skin: Skin color, texture, turgor normal. No rashes or lesions  Neurologic: Mental status: nonverbal, not following commands, is cooperative with care    Palliative Performance Scale:  60% ? Ambulation reduced; Significant disease; Can't do hobbies/housework; intake normal   or reduced; occasional assist; LOC full/confusion  50% ? Mainly sit/lie; Extensive disease; Can't do any work; Considerable assist; intake normal  Or reduced; LOC full/confusion  40% ? Mainly in bed; Extensive disease; Mainly assist; intake normal or reduced; occasional assist; LOC full/confusion  30% ? Bed Bound; Extensive disease; Total care; intake reduced; LOC full/confusion  20% ? Bed Bound; Extensive disease; Total care; intake minimal; Drowsy/coma  10% ? Bed Bound;  Extensive

## 2019-04-15 NOTE — PROGRESS NOTES
2372 Patient observed continuously attempting to climb over bed rails and exit bed, grasping at lines. Sitter PCA reports that the patient self-removed his PIV. He is restless, frequently repositioning himself. Sitter/ continued. PIV replaced in right foot/ankle as documented. 3646 PCA collected fingerstick blood glucose from left hand that resulted from 55. PCA collected a fingerstick blood glucose from right hand upon my request that resulted 91. No decreased circulation or edema on left side, except mild edema near now-discontinued IV infiltrate site on left forearm. Of note, patient often props himself up with bilateral arms and elbows and holds this position for long periods of time. [facility] Updated resident Dr. Jeffery Brunner via [facility] of my 2869 observation and notified that patient has not had any nutrition in two days, appears severely malnourished. 91 21 06 Patient returned to room from barium swallow evaluation. Spoke with Farnaz Reza, SLP who is recommending pureed diet with honey thickened liquids via straw or teaspoon. Reassessed left hand and observed to be cooler to touch with what appears to be Reynaud's symptoms thaat are worse than right hand. Instructed PCA to only collect fingerstick blood glucose from right hand. Last value of 88 collected from right hand. Lab specimens collected and sent as ordered. Resident physician Dr. Jeffery Brunner updated via [facility] of this information and observations. 2520 Sitter/ discontinued. Telesitter initiated. Bed alarm applied. Patient is quietly watching cartoons in bed, not presently restless.

## 2019-04-15 NOTE — PLAN OF CARE
Problem: Falls - Risk of:  Goal: Will remain free from falls  4/14/2019 2147 by Daniel De La Cruz RN  Outcome: Met This Shift  Note:   Bed alarm utilized this shift for pt safety. Pt reoriented to surroundings and bed kept in lowest position with wheel locked and side rails up 3/4. Call light, belongings and bedside table kept within reach. Room door left open. Problem: Risk for Impaired Skin Integrity  Goal: Tissue integrity - skin and mucous membranes  4/14/2019 2147 by Daniel De La Cruz RN  Outcome: Met This Shift  Note:   Pt is of increased risk of skin breakdown. Preventative measures in place. Head to toe skin assessment completed qshift. Medical equipment kept off of skin as much as possible and moisture controlled. Pt turned q2hr and PRN. Low air loss and alternating pressure relief mattress in use. Sacral heart in place and assessed this shift. Problem: Pain:  Goal: Pain level will decrease  4/14/2019 2147 by Daniel De La Cruz RN  Outcome: Met This Shift  Note:   Pain assessed q4hr and PRN using the Advanced Dementia pain scale. PRN pain medication given when needed. Pain reassessed after administration. Repositioned patient q2hr and as needed. Instructed patient to alert RN for symptoms of pain. Problem: Nutrition  Goal: Optimal nutrition therapy  4/14/2019 2147 by Daniel De La Cruz RN  Outcome: Not Met This Shift  Note:   Pt not cleared for diet following SLP consult and remains NPO. Problem: HEMODYNAMIC STATUS  Goal: Patient has stable vital signs and fluid balance  4/14/2019 2147 by Daniel De La Cruz RN  Outcome: Met This Shift  Note:   Pt VSS so far this shift.

## 2019-04-15 NOTE — PLAN OF CARE
Problem: Falls - Risk of:  Goal: Will remain free from falls  Description  Patient has been free from falls and injuries this shift. Lauren fall risk assessed each shift. Bed locked in lowest position, alarm engaged. Fall bracelet in place, fall sign present outside door, fall socks present on patient. Room free of clutter. Will continue to round and assess needs. Pt receiving one on one pt care due to decreased neurologic state and continuing to make attempts at getting out of bed.      4/15/2019 1131 by Hossein Jim RN  Outcome: Ongoing  4/14/2019 2147 by Tai Woods RN  Outcome: Met This Shift  Note:   Bed alarm utilized this shift for pt safety. Pt reoriented to surroundings and bed kept in lowest position with wheel locked and side rails up 3/4. Call light, belongings and bedside table kept within reach. Room door left open. Goal: Absence of physical injury  Description  Absence of physical injury  Outcome: Ongoing     Problem: Risk for Impaired Skin Integrity  Goal: Tissue integrity - skin and mucous membranes  Description  Patient's skin assessed q4h hours and prn. Patient turns self frequently. Skin is kept clean and dry. Preventative dressings have been applied. Sacral heart placed and assessed to be CDI. Charlie scale is assessed and documented each shift. Patient's skin integrity maintained throughout shift. 4/15/2019 1131 by Hossein Jim RN  Outcome: Ongoing  4/14/2019 2147 by Tai Woods RN  Outcome: Met This Shift  Note:   Pt is of increased risk of skin breakdown. Preventative measures in place. Head to toe skin assessment completed qshift. Medical equipment kept off of skin as much as possible and moisture controlled. Pt turned q2hr and PRN. Low air loss and alternating pressure relief mattress in use. Sacral heart in place and assessed this shift.        Problem: Pain:  Goal: Pain level will decrease  Description  Patient's pain assessed every shift, post procedure, prn, or any changes in status. Pt's pain is assessed using Advanced Dementia pain scale. . Pt's pain has been controlled this shift. 4/15/2019 1131 by Anival Keyes RN  Outcome: Met This Shift  4/14/2019 2147 by Meenu Adame RN  Outcome: Met This Shift  Note:   Pain assessed q4hr and PRN using the Advanced Dementia pain scale. PRN pain medication given when needed. Pain reassessed after administration. Repositioned patient q2hr and as needed. Instructed patient to alert RN for symptoms of pain. Goal: Control of acute pain  Description  Control of acute pain  Outcome: Met This Shift  Goal: Control of chronic pain  Description  Control of chronic pain  Outcome: Met This Shift     Problem: Nutrition  Goal: Optimal nutrition therapy  Description  Nutrition Problem: Severe malnutrition  Intervention: Food and/or Nutrient Delivery: Continue NPO, Start oral diet  Nutritional Goals: Pt will tolerate most appropriate form of nutrition therapy when initiated     4/15/2019 1131 by Anival Keyes RN  Outcome: Not Met This Shift  Note:   Physician notified of absence of nutrition therapy since admission two days ago. 4/14/2019 2147 by Meenu Adame RN  Outcome: Not Met This Shift  Note:   Pt not cleared for diet following SLP consult and remains NPO. Problem: HEMODYNAMIC STATUS  Goal: Patient has stable vital signs and fluid balance  4/15/2019 1131 by Anival Keyes RN  Outcome: Met This Shift  4/14/2019 2147 by Meenu Adame RN  Outcome: Met This Shift  Note:   Pt VSS so far this shift.      Problem: ACTIVITY INTOLERANCE/IMPAIRED MOBILITY  Goal: Mobility/activity is maintained at optimum level for patient  Outcome: Met This Shift

## 2019-04-15 NOTE — PROGRESS NOTES
Occupational Therapy   Occupational Therapy Initial Assessment/Discharge Summary  Date: 4/15/2019   Patient Name: Sergio Ramirez  MRN: [id number]     : 1970    Date of Service: 4/15/2019    Discharge Recommendations:  Sergio Ramirez scored a 6/24 on the [facility] ADL Inpatient form. At this time, no further OT is recommended upon discharge. Recommend patient returns to prior setting with prior services. OT Equipment Recommendations  Equipment Needed: No    Assessment   Assessment: Pt required assist of 1 for transfers to w/c and assist for w/c mobility this date. Pt is non-verbal at baseline and has baseline cognitive deficits. Required total assist for ADL tasks but pt is able to initiate some routine tasks. Pt likely presents at his baseline function and is not able to follow commands to meaninfully participate in therapy. Recommend return to prior level of care. Decision Making: Low Complexity  Patient Education: OT  role, bed mobility- no learning noted  No Skilled OT: At baseline function  REQUIRES OT FOLLOW UP: No  Activity Tolerance  Activity Tolerance: Treatment limited secondary to decreased cognition  Safety Devices  Safety Devices in place: Yes  Type of devices: Nurse notified;Call light within reach; Bed alarm in place; Left in bed(PCA present)           Patient Diagnosis(es): There were no encounter diagnoses. has a past medical history of BPH (benign prostatic hyperplasia), Developmental delay, Dysphagia, GERD (gastroesophageal reflux disease), Hypertension, Hypothyroid, Meningitis, Mental retardation, Nonverbal, Schizophrenia ([address] and Seizure disorder ([address] has no past surgical history on file. Restrictions  Position Activity Restriction  Other position/activity restrictions: up with assist    Subjective   General  Chart Reviewed: Yes  Patient assessed for rehabilitation services?: Yes  Additional Pertinent Hx: Pt admit with AMS.  Hospitalized 4/3 for SDH related to a fall. CT Head 4/12 acute L sided 1 cm SDH with mild midline shift, repeat CT Head 4/13 showed no change. PMHx: MRDD, schizophrenia, HTN, seizure d/o  Family / Caregiver Present: No  Diagnosis: SDH  Subjective  Subjective: Pt in bed, sitter present. Social/Functional History  Social/Functional History  Type of Home: (group home with 24-hr care)       Objective   Vision: (unable to assess)  Hearing: (unable to assess)    Orientation  Overall Orientation Status: (ALEXI- pt non-verbal at baseline)     Balance  Sitting Balance: Contact guard assistance(seated eob with cga for safety 2/2 impulsivity)  Functional Mobility  Functional Mobility Comments: Pt completed w/c mobility in hallway propelling himself with LEs. after 20' pt stopped w/c mobility and required transport back to room     ADL  LE Dressing: Dependent/Total(doff/don brief from bed level; don socks)  Toileting: Dependent/Total(incontinent of urine in brief, PCA assist hygiene from bed level)        Bed mobility  Supine to Sit: Contact guard assistance(HOB elevated)  Sit to Supine: Moderate assistance  Scooting: Dependent/Total(pt able to scoot part way up in bed with SBA, required assist to complete. (dep x 2))  Transfers  Stand Pivot Transfers: Maximum assistance(mod A bed>w/c; max A w/c to bed)  Transfer Comments: Pt impulsive, began initiating pivot transfer to w/c without cuing.  Increased difficulty w/c to higher bed     Cognition  Overall Cognitive Status: Exceptions  Following Commands: Does not follow commands  Cognition Comment: Pt has baseline cogntive deficits and is non-verbal. Unable to follow VC but pt does initiate routine tasts (lifted LE to assist don socks, initiates w/c transfer)                 LUE AROM (degrees)  LUE AROM : WFL(observed functional ROM during transfer)  Left Hand PROM (degrees)  Left Hand PROM: WFL  RUE AROM (degrees)  RUE AROM : WFL(observed functional ROM during transfer)  Right Hand PROM (degrees)  Right Hand PROM: WFL  LUE Strength  Gross LUE Strength: WFL(at least 3-/5 observed, unable to follow commands for testing)  RUE Strength  Gross RUE Strength: WFL(at least 3-/5 observed, except wrist extensors- no active movement noted.  unable to follow commands for testing)                   Plan   Plan  Plan Comment: d/c acute OT      AM-PAC Score        AM-PAC Inpatient Daily Activity Raw Score: 6  AM-PAC Inpatient ADL T-Scale Score : 17.07  ADL Inpatient CMS 0-100% Score: 100  ADL Inpatient CMS G-Code Modifier : CN    Goals  Short term goals  Time Frame for Short term goals: no acute OT goals identified       Therapy Time   Individual Concurrent Group Co-treatment   Time In 1011         Time Out 1034         Minutes 23         Timed Code Treatment Minutes:   8  Total Treatment Minutes:  [address] OTR/L, 300 Esvin Johns, OT

## 2019-04-15 NOTE — PROGRESS NOTES
bowel sounds. Musculoskelatal: No clubbing, no edema bilaterally. Dorsalis pedal pulses +   And capillary refills time is  <  than 3 secs. Skin: Skin color, texture, turgor normal.  Neurologic:   Neurovascularly intact grossly - at his base line       Labs:   Recent Labs     04/13/19 0435 04/14/19  0420 04/15/19  0425   WBC 8.7 8.1 8.1   HGB 12.2* 12.1* 12.6*   HCT 36.4* 36.0* 39.4*   PLT 56* 131* 106*     Recent Labs     04/13/19  0434 04/14/19  0420 04/15/19  0425    143 141   K 4.6 4.6 4.3    102 101   CO2 29 29 28   BUN 53* 47* 39*   CREATININE 1.1 1.0 0.9   CALCIUM 9.4 9.4 9.6   PHOS  --   --  3.4     Recent Labs     04/12/19  1457   AST 58*   ALT 67*   BILITOT <0.2   ALKPHOS 132*     Recent Labs     04/12/19  1934   INR 1.05     No results for input(s): Sathya Arreguin in the last 72 hours. No flowsheet data found. Diagnostic Assesment and Plan :   1. Admitted with acute metabolic encephalopathy from acute on chronic left sided traumatic( possible) SDH with mild mid line shift, NS was called from ed, non operative for now   bp at goal, npo, high risk aspiration  Not a good candidate for rehab with underlying advanced mental retardation  Cont home sz meds- keppra, carbamazepine, depakote     resulted 4.15.19 Ref. Range 4/12/2019 14:57 4/12/2019 16:08   Carbamazepine Lvl Latest Ref Range: 4.0 - 12.0 ug/mL 3.8 (L)    Levetiracetam Latest Ref Range: 6.0 - 46.0 ug/mL  15.0   Valproic Acid Lvl Latest Ref Range: 50.0 - 100.0 ug/mL 27.6 (L)    Carbamazepine Dose Unknown Unknown      2. Hypothyroid on synthroid,   3. Elevated bun, uremia, improving with ivf  4. Persistent hypoglycemia, poor po intake, severe malnourishment, underweight, ? Peg tube   Guardian to be called  Pall on board  Grave prognosis      Watch for break through sz, iv ativan on board   Body mass index is 14.45 kg/m².            The following items were considered in medical decision making:  Independent review of images, Review / order clinical lab tests, Review / order radiology, tests Decision to obtain old records. DVT Prophylaxis: lvx   Diet: DIET DYSPHAGIA I PUREED; Honey Thick  Code Status: Full Code    PT/OT Eval Status: . At his/her baseline . This chart was generated in part by using Dragon Dictation system and may contain errors related to that system including errors in grammar, punctuation, and spelling, as well as words and phrases that may be inappropriate. When dictating, effort is made to correct spelling/grammar errors. If there are any questions or concerns please feel free to contact the dictating provider for clarification. I have discussed the above stated plan with the patient and they verbalized understanding and agreed with the plan. A total time of 15 min were exclusively devoted to discuss plan of care and advanced care planning during this encounter. RN notified about todays plan  bed side. Dispo: will monitor, Discharge in few  days to home . Needs to stay in hospital for sx control . Elmer Du MD  [phone]

## 2019-04-15 NOTE — PROCEDURES
INSTRUMENTAL SWALLOW REPORT  MODIFIED BARIUM SWALLOW    NAME: Sergio Ramirez   : 1970  MRN: [id number]       Date of Eval: 4/15/2019     Ordering Physician: DR. Angelina Bennett  Radiologist: Dr. Natali Rodrigeuz     Referring Diagnosis(es): Referring Diagnosis: SDH    Past Medical History:  has a past medical history of BPH (benign prostatic hyperplasia), Developmental delay, Dysphagia, GERD (gastroesophageal reflux disease), Hypertension, Hypothyroid, Meningitis, Mental retardation, Nonverbal, Schizophrenia ([address] and Seizure disorder ([address] Past Surgical History:  has no past surgical history on file. Current Diet Solid Consistency: NPO  Current Diet Liquid Consistency: NPO    Type of Study: Initial MBS     Recent Chest Xray/CT of Chest: (19)  No acute cardiopulmonary process identified.     Patient Complaints/Reason for Referral:  Sergio Ramirez was referred for a MBS to assess the efficiency of his/her swallow function, assess for aspiration, and to make recommendations regarding safe dietary consistencies, effective compensatory strategies, and safe eating environment. Onset of problem:   Date of Onset: 19    Behavior/Cognition/Vision/Hearing:  Behavior/Cognition: Alert; Doesn't follow directions    Impressions:  Pt with mod oropharyngeal dysphagia. Pt consumes puree with thin liquids via straw prior to admission. Pt presented with puree, honey thick via tsp/straw, nectar via tsp/straw. With all consistencies, swallow is audible and bolus spills quickly over base of tongue into valleculae and then into pyriform sinus cavities. Swallow does not initiate until bolus reaches pyriforms. Pt protected airway with puree and honey thick liquids on all trials. However gross aspiration occurred with nectar via straw, with no cough reflex. Via tsp, no penetration/aspiration occurred. Not able to assess strategies due to pt cognitive impairment and inability to follow commands.    Recommend puree with honey thick liquids via straw. Treatment Dx and ICD 10: oropharyngeal dysphagia   Patient Position: Lateral and Patient Degrees: 90    Consistencies Administered: Nectar straw;Nectar  teaspoon;Honey teaspoon;Honey straw;Puree    Dysphagia Outcome Severity Scale: Level 3: Moderate dysphagia- Total assisstance, supervision or strategies. Two or more diet consistencies restricted  Penetration-Aspiration Scale (PAS): 8 - Material Enters the airway, passes below the vocal folds, and no effort is made to eject    Recommended Diet:  Solid consistency: Dysphagia I Pureed  Liquid consistency: Honey  Liquid administration via: Straw;Spoon    Medication administration: Meds in puree    Safe Swallow Protocol:  Supervision: 1:1  Compensatory Swallowing Strategies:   Small bites/sips  Eat/Feed slowly  Assist feed  Upright as possible for all oral intake  Use straw    Recommendations/Treatment  Requires SLP Intervention: Yes  D/C Recommendations: pt will benefit from follow up upon dc due to modification of diet/recommendations    Recommended Exercises: n/a   Therapeutic Interventions: Diet tolerance monitoring;Patient/Family education;Oral care    Education: Images and recommendations were reviewed with pt following this exam.   Patient Education: Pt educated to purpose of visit  Patient Education Response: No evidence of learning    Prognosis  Prognosis for safe diet advancement: fair  Barriers to reach goals: cognitive deficits  Duration/Frequency of Treatment  Duration/Frequency of Treatment: 2-4 x      Goals:    1- The patient will tolerate repeat BSE when able.   4/15:  Pt alert, non verbal at baseline. Pt opens mouth when he sees the spoon or cup. Pt presented with trials of ice chips, water via straw, nectar thick water via tsp/straw and applesauce. Pt with audible swallow, swallow appears to trigger very quickly and intermittent cough across all consistencies.   Extra dry swallows were noted after PO presentations, which may be indicative of pharyngeal residue. Recommend MBS to fully analyze swallow function. Rn in agreement  Goal met     New goal:  2-Pt will participate in [facility]  4/15: goal met        Oral Preparation / Oral Phase  Pt able to suck from straw and take bolus from spoon and contain in oral cavity with no anterior loss. No oral residue noted. Pharyngeal Phase  Pt presented with puree, honey thick via tsp/straw, nectar via tsp/straw. With all consistencies, swallow is audible and bolus spills quickly over base of tongue into valleculae and then into pyriform sinus cavities. Swallow does not initiate until bolus reaches pyriforms. Pt protected airway with puree and honey thick liquids on all trials. However gross aspiration occurred with nectar via straw, with no cough reflex. Via tsp, no penetration/aspiration occurred. Not able to assess strategies due to pt cognitive impairment and inability to follow directions    Esophageal Phase  Not assessed    Pain   Patient Currently in Pain: No  Pain Level: 0      Therapy Time:   Individual Concurrent Group Co-treatment   Time In 1040         Time Out 1100         Minutes 20            Plan:  Recommended diet: puree with honey thick liquids  Dc recommendations: pt will benefit from follow up upon dc due to modification of diet/recommendations    Wei Munguia M.S./[facility]-SLP #[id number]  Pg.  # [phone]    Needs met prior to leaving room, call light within reach, d/w TI [place]  This document will serve as a dc summary if pt dc prior to next visit   4/15/2019, 11:15 AM

## 2019-04-15 NOTE — PROGRESS NOTES
NEUROSURGERY PROGRESS NOTE    4/15/2019 10:29 AM                               Faye Truong                      LOS: 2 days               Subjective:  No acute events overnight. Patient has no s/s of pain this am.         Physical Exam:  Patient seen and examined    Vitals:    04/15/19 0815   BP: (!) 123/53   Pulse: 59   Resp: 15   Temp: 97.9 °F (36.6 °C)   SpO2: 98%       GCS:  4 - Opens eyes on own  2 - Moans, makes unintelligible sounds (Baseline)  5 - Pushes away noxious stimulus (Baseline)  General: Well developed. Alert and cooperative in no acute distress. HENT: atraumatic, neck supple  Eyes: Optic discs: Not tested  Pulmonary: unlabored respiratory effort  Cardiovascular:  Warm well perfused. No peripheral edema  Gastrointestinal: abdomen soft, NT, ND    Neurological:  -lying bed and moving all fours spontaneously and purposefully - trying to get out of bed  -PERRL, EOMI with conjugate gaze  -Awake but nonverbal and will not follow commands (moans and grunts)  -bruises everywhere    Radiological Findings:  Ct Head Wo Contrast  Result Date: 4/12/2019  There is an acute left-sided subdural hematoma measuring 1 cm causing effacement of the sulci and mild midline shift toward the right as discussed above. No other obvious acute finding within limitations of the exam.     Ct Head Without Contrast  Result Date: 4/13/2019  No change      Ct Head Wo Contrast  Result Date: 4/14/2019  No significant interval change. Stable left cerebral convexity subdural hematoma measuring up to 1.5 cm in thickness with mass effect and 6 mm midline shift to right. Slight extension into left parafalcine region and along left tentorial leaflet. Stable small right anterior temporal convexity subdural hematoma measuring 3 mm in thickness.     Labs:  Recent Labs     04/15/19  0425   WBC 8.1   HGB 12.6*   HCT 39.4*   *       Recent Labs     04/15/19  0425      K 4.3      CO2 28   BUN 39*   CREATININE 0.9   GLUCOSE 90   CALCIUM 9.6   PHOS 3.4       Recent Labs     04/12/19 1934   PROTIME 12.0   INR 1.05   APTT 43.4*       Patient Active Problem List    Diagnosis Date Noted    Severe malnutrition ([facility] 04/13/2019    Subdural hematoma ([facility] 04/12/2019       Assessment:  Patient is a 46 y.o. male w/left hemispheric acute SDH stable on repeat head CT without focal neuro deficit or aletered LOC    Plan:  1. Neurologically stable  2. Neurologic exams frequency:  - Floor: Q4H  3. For change in exam MUST contact neurosurgery team along with critical care or primary team  4. SDH:  - Follow up head CT stable. No further imaging unless there is a decline in neurologic  - Maintain SBP <160; If PRN med insufficient, then may start Nicardipine infusion  - Keep Plt >100k & INR <1.4  - Hold all anticoagulation & antiplatelet for 2 weeks  5. Seizure prophylaxis: Continue Home dosage  6. DVT Prophylaxis: SCD's & Can start SQ Heparin  7. Mobility: PT/OT as tolerates  8. Diet: Advance as tolerates  9. F/U with Dr. Ginny Arthur in clinic in 2 weeks w/repeat CT Head  10. Will follow peripherally. Please call with any questions or decline in neurological status    DISPO: OK to transfer to floor from neurosurgery standpoint. Dispo timing to be determined by primary team once patient is medically stable for discharge. Patient was discussed with Dr. Kayla Triana who agrees with above assessment and plan.      Electronically signed by: RUPERT Sweet, 4/15/2019 10:29 AM  [phone]

## 2019-04-15 NOTE — PROGRESS NOTES
Currently in Pain: (unable to assess; pt does not appear to have pain.)  Vital Signs  Patient Currently in Pain: (unable to assess; pt does not appear to have pain.)       Orientation  Orientation  Overall Orientation Status: (unable to assess)  Social/Functional History  Social/Functional History  Type of Home: (group home with 24-hr care)  Cognition        Objective          PROM RLE (degrees)  RLE PROM: WNL  PROM LLE (degrees)  LLE PROM: WNL  Strength RLE  Strength RLE: (unable to assess)  Strength LLE  Strength LLE: (unable to assess)        Bed mobility  Supine to Sit: Contact guard assistance(HOB elevated)  Sit to Supine: Moderate assistance  Scooting: Dependent/Total(pt able to scoot part way up in bed with SBA, required assist to complete. (dep x 2))  Transfers  Squat Pivot Transfers: Moderate Assistance;Maximum Assistance(bed->w/c mod A, w/c->bed max A)  Ambulation  Ambulation?: No(pt is non-ambulatory)  Wheelchair Activities  Propulsion: Yes  Propulsion 1  Propulsion: Manual  Level: Level Tile  Method: RLE;LLE  Level of Assistance: Stand by assistance  Description/ Details: distance limited d/t pt not willing to continue  Distance: 25 ft     Balance  Sitting - Static: Good;-(SBA to CGA at EOB)        Plan   Safety Devices  Type of devices: Left in bed, Sitter present, Nurse notified    G-Code  PT G-Codes  Functional Limitation: Mobility: Walking and moving around  Mobility: Walking and Moving Around Current Status (): At least 60 percent but less than 80 percent impaired, limited or restricted  Mobility: Walking and Moving Around Discharge Status ():  At least 60 percent but less than 80 percent impaired, limited or restricted                                                    AM-PAC Score  AM-PAC Inpatient Mobility Raw Score : 11  AM-PAC Inpatient T-Scale Score : 33.86  Mobility Inpatient CMS 0-100% Score: 72.57  Mobility Inpatient CMS G-Code Modifier : CL                  Therapy Time Individual Concurrent Group Co-treatment   Time In 1011         Time Out 1034         Minutes 23                 Heath Rocha, PT

## 2019-04-16 LAB
ANION GAP SERPL CALCULATED.3IONS-SCNC: 8 MMOL/L (ref 3–16)
BUN BLDV-MCNC: 32 MG/DL (ref 7–20)
C DIFFICILE TOXIN, EIA: NORMAL
CALCIUM SERPL-MCNC: 8.2 MG/DL (ref 8.3–10.6)
CHLORIDE BLD-SCNC: 105 MMOL/L (ref 99–110)
CO2: 28 MMOL/L (ref 21–32)
CREAT SERPL-MCNC: 0.8 MG/DL (ref 0.9–1.3)
CULTURE, BLOOD 2: ABNORMAL
CULTURE, BLOOD 2: ABNORMAL
ESTIMATED AVERAGE GLUCOSE: 116.9 MG/DL
GFR AFRICAN AMERICAN: >60
GFR NON-AFRICAN AMERICAN: >60
GLUCOSE BLD-MCNC: 109 MG/DL (ref 70–99)
GLUCOSE BLD-MCNC: 114 MG/DL (ref 70–99)
GLUCOSE BLD-MCNC: 122 MG/DL (ref 70–99)
GLUCOSE BLD-MCNC: 64 MG/DL (ref 70–99)
GLUCOSE BLD-MCNC: 77 MG/DL (ref 70–99)
GLUCOSE BLD-MCNC: 85 MG/DL (ref 70–99)
GLUCOSE BLD-MCNC: 91 MG/DL (ref 70–99)
GLUCOSE BLD-MCNC: 94 MG/DL (ref 70–99)
HBA1C MFR BLD: 5.7 %
HCT VFR BLD CALC: 28.6 % (ref 40.5–52.5)
HEMOGLOBIN: 9.6 G/DL (ref 13.5–17.5)
MCH RBC QN AUTO: 30.8 PG (ref 26–34)
MCHC RBC AUTO-ENTMCNC: 33.6 G/DL (ref 31–36)
MCV RBC AUTO: 91.5 FL (ref 80–100)
ORGANISM: ABNORMAL
PDW BLD-RTO: 19 % (ref 12.4–15.4)
PERFORMED ON: ABNORMAL
PERFORMED ON: NORMAL
PLATELET # BLD: 72 K/UL (ref 135–450)
PMV BLD AUTO: 9.2 FL (ref 5–10.5)
POTASSIUM SERPL-SCNC: 3.6 MMOL/L (ref 3.5–5.1)
RBC # BLD: 3.12 M/UL (ref 4.2–5.9)
SODIUM BLD-SCNC: 141 MMOL/L (ref 136–145)
WBC # BLD: 3.6 K/UL (ref 4–11)

## 2019-04-16 PROCEDURE — 6360000002 HC RX W HCPCS: Performed by: STUDENT IN AN ORGANIZED HEALTH CARE EDUCATION/TRAINING PROGRAM

## 2019-04-16 PROCEDURE — 2580000003 HC RX 258: Performed by: STUDENT IN AN ORGANIZED HEALTH CARE EDUCATION/TRAINING PROGRAM

## 2019-04-16 PROCEDURE — 94761 N-INVAS EAR/PLS OXIMETRY MLT: CPT

## 2019-04-16 PROCEDURE — 6360000002 HC RX W HCPCS: Performed by: INTERNAL MEDICINE

## 2019-04-16 PROCEDURE — 85027 COMPLETE CBC AUTOMATED: CPT

## 2019-04-16 PROCEDURE — 6370000000 HC RX 637 (ALT 250 FOR IP): Performed by: STUDENT IN AN ORGANIZED HEALTH CARE EDUCATION/TRAINING PROGRAM

## 2019-04-16 PROCEDURE — 1200000000 HC SEMI PRIVATE

## 2019-04-16 PROCEDURE — 80048 BASIC METABOLIC PNL TOTAL CA: CPT

## 2019-04-16 PROCEDURE — 51798 US URINE CAPACITY MEASURE: CPT

## 2019-04-16 RX ORDER — LEVOTHYROXINE SODIUM 0.12 MG/1
125 TABLET ORAL DAILY
Status: DISCONTINUED | OUTPATIENT
Start: 2019-04-17 | End: 2019-04-17

## 2019-04-16 RX ADMIN — CLONAZEPAM 0.5 MG: 0.5 TABLET ORAL at 11:15

## 2019-04-16 RX ADMIN — ARIPIPRAZOLE 20 MG: 5 TABLET ORAL at 19:39

## 2019-04-16 RX ADMIN — LORAZEPAM 1 MG: 2 INJECTION INTRAMUSCULAR; INTRAVENOUS at 20:50

## 2019-04-16 RX ADMIN — DIVALPROEX SODIUM 250 MG: 125 CAPSULE, COATED PELLETS ORAL at 19:38

## 2019-04-16 RX ADMIN — DEXTROSE 50 % IN WATER (D50W) INTRAVENOUS SYRINGE 12.5 G: at 10:07

## 2019-04-16 RX ADMIN — CARBAMAZEPINE 200 MG: 200 TABLET ORAL at 19:38

## 2019-04-16 RX ADMIN — CARBAMAZEPINE 200 MG: 200 TABLET ORAL at 11:15

## 2019-04-16 RX ADMIN — LEVETIRACETAM 500 MG: 500 TABLET ORAL at 11:15

## 2019-04-16 RX ADMIN — DIVALPROEX SODIUM 250 MG: 125 CAPSULE, COATED PELLETS ORAL at 11:16

## 2019-04-16 RX ADMIN — TAMSULOSIN HYDROCHLORIDE 0.4 MG: 0.4 CAPSULE ORAL at 11:15

## 2019-04-16 RX ADMIN — LEVETIRACETAM 500 MG: 500 TABLET ORAL at 19:39

## 2019-04-16 RX ADMIN — CLONAZEPAM 1 MG: 1 TABLET ORAL at 19:38

## 2019-04-16 RX ADMIN — LEVOTHYROXINE SODIUM 112 MCG: 112 TABLET ORAL at 11:15

## 2019-04-16 RX ADMIN — ENOXAPARIN SODIUM 40 MG: 40 INJECTION SUBCUTANEOUS at 10:31

## 2019-04-16 RX ADMIN — MIRTAZAPINE 30 MG: 15 TABLET, FILM COATED ORAL at 19:39

## 2019-04-16 RX ADMIN — DEXTROSE AND SODIUM CHLORIDE: 5; 450 INJECTION, SOLUTION INTRAVENOUS at 00:51

## 2019-04-16 RX ADMIN — DEXTROSE AND SODIUM CHLORIDE: 5; 450 INJECTION, SOLUTION INTRAVENOUS at 20:55

## 2019-04-16 RX ADMIN — CLONAZEPAM 0.5 MG: 0.5 TABLET ORAL at 19:39

## 2019-04-16 RX ADMIN — Medication 10 ML: at 19:45

## 2019-04-16 ASSESSMENT — PAIN SCALES - PAIN ASSESSMENT IN ADVANCED DEMENTIA (PAINAD)
CONSOLABILITY: 0
TOTALSCORE: 0
BODYLANGUAGE: 0
CONSOLABILITY: 0
BREATHING: 0
TOTALSCORE: 0
FACIALEXPRESSION: 0
TOTALSCORE: 0
CONSOLABILITY: 0
NEGVOCALIZATION: 0
BREATHING: 0
BREATHING: 0
BODYLANGUAGE: 0
CONSOLABILITY: 0
NEGVOCALIZATION: 0
BREATHING: 0
TOTALSCORE: 0
NEGVOCALIZATION: 0
TOTALSCORE: 0
NEGVOCALIZATION: 0
BODYLANGUAGE: 0
TOTALSCORE: 0
BODYLANGUAGE: 0
NEGVOCALIZATION: 0
CONSOLABILITY: 0
CONSOLABILITY: 0
TOTALSCORE: 0
TOTALSCORE: 0
CONSOLABILITY: 0
FACIALEXPRESSION: 0
BODYLANGUAGE: 0
BREATHING: 0
BODYLANGUAGE: 0
FACIALEXPRESSION: 0
BODYLANGUAGE: 0
CONSOLABILITY: 0
FACIALEXPRESSION: 0
NEGVOCALIZATION: 0
FACIALEXPRESSION: 0
BREATHING: 0
BODYLANGUAGE: 0
TOTALSCORE: 0
BREATHING: 0
BODYLANGUAGE: 0
NEGVOCALIZATION: 0
FACIALEXPRESSION: 0
TOTALSCORE: 0
CONSOLABILITY: 0
TOTALSCORE: 0
NEGVOCALIZATION: 0
FACIALEXPRESSION: 0
FACIALEXPRESSION: 0
BREATHING: 0
FACIALEXPRESSION: 0
BODYLANGUAGE: 0
BREATHING: 0
BREATHING: 0
NEGVOCALIZATION: 0
NEGVOCALIZATION: 0
BREATHING: 0
NEGVOCALIZATION: 0
BODYLANGUAGE: 0

## 2019-04-16 ASSESSMENT — PAIN SCALES - GENERAL
PAINLEVEL_OUTOF10: 0
PAINLEVEL_OUTOF10: 0

## 2019-04-16 NOTE — PROGRESS NOTES
Internal Medicine PGY-1 Resident Progress Note        PCP: No primary care provider on file. Date of Admission: 4/13/2019    Chief Complaint: Altered mental status; lethargy     Hospital Course (per HPI): Joellen madrid 46 y. o. male w/ PMH severe MRDD, seizures, schizophrenia, hypothyroidism, BPH who presents with increased lethargy, decreased appetite and new bruises that found was admitted for symptomatic worsening of subacute left hemispheric subdural hematoma (CT Head 4/12 acute L sided 1 cm SDH with mild midline shift). Etiology was unclear as history is limited from patient's severe MRDD, but was likely trauma secondary to another fall (recent SDH 2/2 fall on 4/3). Subjective: Patient was seen and examined this AM. No acute events overnight. Appears to be at baseline mental status. He is alert and moving extremities spontaneously. Spoke with care-taker this afternoon; [facility] to be [facility] pending approval by three physicians.  Anticipate discharge in the AM.      Medications:  Reviewed    Infusion Medications    dextrose 5 % and 0.45 % NaCl 150 mL/hr at 04/16/19 0051    dextrose       Scheduled Medications    [START ON 4/17/2019] levothyroxine  125 mcg Oral Daily    enoxaparin  40 mg Subcutaneous Daily    levETIRAcetam  500 mg Oral BID    divalproex  250 mg Oral BID    ARIPiprazole  20 mg Oral Nightly    carBAMazepine  200 mg Oral BID    clonazePAM  1 mg Oral Nightly    [Held by provider] lactulose  20 g Oral BID    mirtazapine  30 mg Oral Nightly    tamsulosin  0.4 mg Oral Daily    sodium chloride flush  10 mL Intravenous 2 times per day    clonazePAM  0.5 mg Oral BID     PRN Meds: LORazepam, sodium chloride flush, acetaminophen, magnesium hydroxide, ondansetron, labetalol, glucose, glucagon (rDNA), dextrose, dextrose      Intake/Output Summary (Last 24 hours) at 4/16/2019 1515  Last data filed at 4/16/2019 0600  Gross per 24 hour   Intake 4250 ml   Output --   Net 4250 ml Physical Exam Performed:    BP 84/62   Pulse 70   Temp 97.9 °F (36.6 °C) (Rectal)   Resp 16   Ht 5' 8\" (1.727 m)   Wt 95 lb 0.3 oz (43.1 kg)   SpO2 98%   BMI 14.45 kg/m²     General appearance: Non-verbal at baseline  HEENT: Pupils equal, round, and reactive to light. Conjunctivae/corneas clear. Respiratory: Normal respiratory effort. Clear to auscultation, bilaterally without Rales/Wheezes/Rhonchi. Cardiovascular: Regular rate and rhythm with normal S1/S2 without murmurs, rubs or gallops. Abdomen: Soft, non-tender, non-distended with normal bowel sounds. Musculoskeletal: No clubbing, cyanosis or edema bilaterally. Full range of motion without deformity. Skin: Abrasions present  Neurologic:  Limited exam due to mental status. Able to spontaeously move UE and LE. Peripheral Pulses: +2 palpable, equal bilaterally     Labs:   Recent Labs     04/14/19  0420 04/15/19  0425 04/16/19  0538   WBC 8.1 8.1 3.6*   HGB 12.1* 12.6* 9.6*   HCT 36.0* 39.4* 28.6*   * 106* 72*     Recent Labs     04/14/19  0420 04/15/19  0425 04/16/19  0538    141 141   K 4.6 4.3 3.6    101 105   CO2 29 28 28   BUN 47* 39* 32*   CREATININE 1.0 0.9 0.8*   CALCIUM 9.4 9.6 8.2*   PHOS  --  3.4  --      No results for input(s): AST, ALT, BILIDIR, BILITOT, ALKPHOS in the last 72 hours. No results for input(s): INR in the last 72 hours. No results for input(s): [place] in the last 72 hours. Urinalysis:      Lab Results   Component Value Date    NITRU Negative 04/12/2019    WBCUA 6 04/12/2019    RBCUA 18 04/12/2019    BLOODU SMALL 04/12/2019    SPECGRAV 1.025 04/12/2019    GLUCOSEU Negative 04/12/2019       Radiology:  FL MODIFIED BARIUM SWALLOW W VIDEO   Final Result      Penetration and aspiration with nectar. Refer to speech pathologist note for dietary recommendations. CT HEAD WO CONTRAST   Final Result      No significant interval change.       Stable left cerebral convexity subdural hematoma measuring up to 1.5 cm in thickness with mass effect and 6 mm midline shift to right. Slight extension into left parafalcine region and along left tentorial leaflet. Stable small right anterior temporal convexity subdural hematoma measuring 3 mm in thickness. CT head without contrast   Final Result     No change              Assessment/Plan:  Patient is a 75-year-old male with an extensive past medical history [facility] after being found to be increasingly lethargic by his caregiver. Admitted for:     Subdural hematoma  - Repeat Head CT Stable  - NSGY following; patient stable for discharge    - SBP < 160, labetalol PRN q6H  - keep HOB > 30  - Neuro check q4h  - PT/OT  - SLP following; recommended Dysphagia I diet with honey thick liquids     Hypoglycemia -Patient has been persistently hypoglycemic for unclear reasons.   -Pro-insulin pending   -Insulin pending   -C-peptide pending   -Sulfonylurea screen pending   -TSH elevated at 6.51  -Free T4 within normal limits   -Beta hydroxybutyrate within normal limits at 0.08   -Continue Accuchecks      SAUL, resoled  -Most recent creatinine 0.9      MRDD  -Baseline non-verbal      Seizures  H/o seizures. On carbamazepine, keppra, depakote. In ED, found to be subtherapeutic on carbamazepine and valproic acid. - Continue home PO carbamazepine 200 BID, depakote 250 AM, 500 PM, keppra 500 BID. Hypothyroidism  -Increase levothyroxine to 125 mcg daily      BPH  - Continue home Flomax      DVT Prophylaxis: Lovenox (okay to start 4/15/19 per neurosurgery)   Diet: DIET DYSPHAGIA I PUREED;  Honey Thick  Dietary Nutrition Supplements: Frozen Oral Supplement  Code Status: Full Code    PT/OT Eval Status: Consulted     Dispo - General medicine/surgery floors     I will discuss the patient with the senior resident and MD Brant Rosario MD  Internal Medicine Resident PGY-1  Julio

## 2019-04-16 NOTE — PROGRESS NOTES
Patient will not leave any equipment on. He is very restless in bed. Perfect serve sent to Dr. Christina Bashir.

## 2019-04-16 NOTE — PLAN OF CARE
Problem: Nutrition  Goal: Optimal nutrition therapy  Description        Outcome: Ongoing   Nutrition Problem: Severe malnutrition  Intervention: Food and/or Nutrient Delivery: Continue current diet, Continue current ONS  Nutritional Goals: Pt will consume >75% of meals and ONS offered; no further weight loss

## 2019-04-16 NOTE — PROGRESS NOTES
Speech Language Pathology  Facility/Department: [facility]  Dysphagia Daily Treatment Note    Chart reviewed, d/w RN. Pt lethargic, attempted to present tsp of magic cup. Pt did open mouth to spoon, however not able to contain bolus in oral cavity. Pt too lethargic for PO at this time. RN present and agrees. Will follow up as pt appropriate and schedule allows. Christian Singh M.S./[facility]-SLP #[id number]  Pg.  # [phone]

## 2019-04-16 NOTE — PROGRESS NOTES
Nutrition Assessment    Type and Reason for Visit: Reassess    Nutrition Recommendations:   · Monitor and encourage PO intake on diet per SLP recommendations: dysphagia 1, pureed diet w/ honey thick liquids  · If oral intake is not able to improve, suggest NGT placement for nutrition. Consult RD for TF recommendations   · Magic Cup TID    Meets ASPEN criteria for severe malnutrition. Aggressive nutrition intervention should be considered, up to and including alternative means of nutrition/hydration, if nutrition status can not improve. Despite reports of good PO intake PTA, pt continues to lose weight. Long term nutrition via PEG vs quality of life should be considered. Consult RD if TF recommendations are desired     Nutrition Assessment: Pt continues to be at high nutritional risk r/t severe PCM. S/p MBS 4/15 and diet was advanced to dysphagia 1, pureed w/ honey thick liquids per SLP recommendations. Noted pt consumed % x1 meal yesterday. Lethargic this am and unable to participate w/ SLP. Noted per record from group home pt had PEG at one time but it was removed r/t good PO intake. Per EMR review other admissions pt had good PO intake previous admissions. Per record pt had weight loss from ~200lb in 2013 to 125 lb in 2014 and 103 lb Nov 2018. Continues to lose weight despite good PO intake. Pt is a total assist w/ meals. If aggressive care is desired, nutrition support should be discussed r/t continued weight loss. Continue to fed pt at meals on diet per SLP recommendations w/ ONS TID     Malnutrition Assessment:  · Malnutrition Status: Meets the criteria for severe malnutrition  · Context: Chronic illness  · Findings of the 6 clinical characteristics of malnutrition (Minimum of 2 out of 6 clinical characteristics is required to make the diagnosis of moderate or severe Protein Calorie Malnutrition based on AND/ASPEN Guidelines):  1. Energy Intake-Unable to assess,      2.  Weight Loss-7.5% loss or greater, (in 5 months)  3. Fat Loss-Severe subcutaneous fat loss, Orbital, Triceps, Fat overlying the ribs  4. Muscle Loss-Severe muscle mass loss, Temples (temporalis muscle), Clavicles (pectoralis and deltoids), Thigh (quadriceps), Calf (gastrocnemius), Scapula (trapezius)  5. Fluid Accumulation-No significant fluid accumulation,    6.   Strength-Not measured    Nutrition Risk Level: High    Nutrient Needs:  · Estimated Daily Total Kcal: 6843-0318 kcal (33-35 kcal/ kg +250 kcal for wt gain)  · Estimated Daily Protein (g): 65-77 grams (1.5-1.8)  · Estimated Daily Total Fluid (ml/day): 1750 mL    Nutrition Diagnosis:   · Problem: Severe malnutrition  · Etiology: related to Insufficient energy/nutrient consumption     Signs and symptoms:  as evidenced by Severe loss of subcutaneous fat, BMI, Severe muscle loss    Objective Information:  · Nutrition-Focused Physical Findings: cachectic; LBM 4/16; no edema; nonverbal at baseline  · Wound Type: None  · Current Nutrition Therapies:  · Oral Diet Orders: Dysphagia 1 (Pureed), Honey Thick   · Oral Diet intake: %  · Oral Nutrition Supplement (ONS) Orders: Frozen Oral Supplement  · Anthropometric Measures:  · Ht: 5' 8\" (172.7 cm)   · Current Body Wt: 95 lb 0.3 oz (43.1 kg)  · Admission Body Wt: 94 lb (42.6 kg)  · Usual Body Wt: (ALEXI)  · % Weight Change:    8.7% loss in 5 months per EMR review  · Ideal Body Wt: 154 lb (69.9 kg)   · BMI Classification: BMI <18.5 Underweight    Nutrition Interventions:   Continue current diet, Continue current ONS  Continued Inpatient Monitoring    Nutrition Evaluation:   · Evaluation: Goals set   · Goals: Pt will consume >75% of meals and ONS offered; no further weight loss    · Monitoring: Meal Intake, Supplement Intake, Weight      Electronically signed by Jennifer Patel RD, LD on 4/16/19 at 11:50 AM    Contact Number:   Pager: [phone]  Office: [phone]

## 2019-04-16 NOTE — PROGRESS NOTES
Hospitalist Progress Note      PCP: No primary care provider on file. Date of Admission: 4/13/2019  Hospital day - 3        Medications:  Reviewed    Infusion Medications    dextrose 5 % and 0.45 % NaCl 150 mL/hr at 04/16/19 0051    dextrose       Scheduled Medications    [START ON 4/17/2019] levothyroxine  125 mcg Oral Daily    enoxaparin  40 mg Subcutaneous Daily    levETIRAcetam  500 mg Oral BID    divalproex  250 mg Oral BID    ARIPiprazole  20 mg Oral Nightly    carBAMazepine  200 mg Oral BID    clonazePAM  1 mg Oral Nightly    [Held by provider] lactulose  20 g Oral BID    mirtazapine  30 mg Oral Nightly    tamsulosin  0.4 mg Oral Daily    sodium chloride flush  10 mL Intravenous 2 times per day    clonazePAM  0.5 mg Oral BID     PRN Meds: LORazepam, sodium chloride flush, acetaminophen, magnesium hydroxide, ondansetron, labetalol, glucose, glucagon (rDNA), dextrose, dextrose      Intake/Output Summary (Last 24 hours) at 4/16/2019 1321  Last data filed at 4/16/2019 0600  Gross per 24 hour   Intake 4610 ml   Output --   Net 4610 ml         Chief Complaint: confusion, more than his baseline, found new sdh         Subjective: 4.15- eating with little help    4.16- min po intake, @ 40%     ROS:  He is non verbal, he appears in no distress     Exam performed by me:    BP 84/62   Pulse 70   Temp 97.9 °F (36.6 °C) (Rectal)   Resp 16   Ht 5' 8\" (1.727 m)   Wt 95 lb 0.3 oz (43.1 kg)   SpO2 98%   BMI 14.45 kg/m²       General appearance: comfortable, distress- none   HEENT: Atraumatic, Pupils equal, round, and reactive to light. Extra ocular muscles intact. Neck: Supple, with full range of motion. No jugular venous distention. Respiratory:  Clear to  auscultation , accessory muscle use no, Rales/Ronchi no  Cardiovascular: Regular rate and rhythm with normal S1/S2 ,  Abdomen: Soft, non-tender, non-distended with normal bowel sounds. Musculoskelatal: No clubbing, no edema bilaterally. Dorsalis pedal pulses +   And capillary refills time is  <  than 3 secs. Skin: Skin color, texture, turgor normal.  Neurologic:   Neurovascularly intact grossly - at his base line       Labs:   Recent Labs     04/14/19  0420 04/15/19  0425 04/16/19  0538   WBC 8.1 8.1 3.6*   HGB 12.1* 12.6* 9.6*   HCT 36.0* 39.4* 28.6*   * 106* 72*     Recent Labs     04/14/19  0420 04/15/19  0425 04/16/19  0538    141 141   K 4.6 4.3 3.6    101 105   CO2 29 28 28   BUN 47* 39* 32*   CREATININE 1.0 0.9 0.8*   CALCIUM 9.4 9.6 8.2*   PHOS  --  3.4  --      No results for input(s): AST, ALT, BILIDIR, BILITOT, ALKPHOS in the last 72 hours. No results for input(s): INR in the last 72 hours. No results for input(s): [place] in the last 72 hours. No flowsheet data found. Diagnostic Assesment and Plan :   1. Admitted with acute metabolic encephalopathy from acute on chronic left sided traumatic( possible) SDH with mild mid line shift, NS was called from ed, non operative for now   bp at goal, npo, high risk aspiration  Not a good candidate for rehab with underlying advanced mental retardation  Cont home pradeep meds- keppra, carbamazepine, depakote     resulted 4.15.19 Ref. Range 4/12/2019 14:57 4/12/2019 16:08   Carbamazepine Lvl Latest Ref Range: 4.0 - 12.0 ug/mL 3.8 (L)    Levetiracetam Latest Ref Range: 6.0 - 46.0 ug/mL  15.0   Valproic Acid Lvl Latest Ref Range: 50.0 - 100.0 ug/mL 27.6 (L)    Carbamazepine Dose Unknown Unknown      2. Hypothyroid on synthroid,   3. Elevated bun, uremia, improving with ivf  4. Persistent hypoglycemia, poor po intake, severe malnourishment, underweight, ? Peg tube   Guardian to be called  Pall on board  Grave prognosis      Watch for break through sz, iv ativan on board   Body mass index is 14.45 kg/m².            The following items were considered in medical decision making:  Independent review of images, Review / order clinical lab tests, Review / order radiology, tests Decision to obtain old records. DVT Prophylaxis: lvx   Diet: DIET DYSPHAGIA I PUREED; Honey Thick  Dietary Nutrition Supplements: Frozen Oral Supplement  Code Status: Full Code    PT/OT Eval Status: . At his/her baseline . This chart was generated in part by using Dragon Dictation system and may contain errors related to that system including errors in grammar, punctuation, and spelling, as well as words and phrases that may be inappropriate. When dictating, effort is made to correct spelling/grammar errors. If there are any questions or concerns please feel free to contact the dictating provider for clarification. I have discussed the above stated plan with the patient and they verbalized understanding and agreed with the plan. A total time of 15 min were exclusively devoted to discuss plan of care and advanced care planning during this encounter. RN notified about todays plan  bed side. Dispo: will monitor, Discharge in few  days to home . Needs to stay in hospital for sx control . Elmer Celaya MD  [phone]

## 2019-04-16 NOTE — DISCHARGE INSTR - COC
Continuity of Care Form    Patient Name: Joslyn Casper   :  1970  MRN:  [id number]    Admit date:  2019  Discharge date:  ***    Code Status Order: Full Code   Advance Directives:   Advance Care Flowsheet Documentation     Date/Time Healthcare Directive Type of Healthcare Directive Copy in [address] Agent's Name Healthcare Agent's Phone Number    19  Unknown, patient unable to respond due to medical condition -- -- -- -- --          Admitting Physician:  Arnoldo Tolentino MD  PCP: No primary care provider on file. Discharging Nurse: [facility] Unit/Room#: 8595/8974-42  Discharging Unit Phone Number: ***    Emergency Contact:   Extended Emergency Contact Information  Primary Emergency Contact: Preston Dooley  Home Phone: [phone]  Relation: Other  Secondary Emergency Contact: S, healthcare  Work Phone: [phone]  Relation: Lay Caregiver  Other needs: Other   needed? No    Past Surgical History:  No past surgical history on file. Immunization History: There is no immunization history on file for this patient.     Active Problems:  Patient Active Problem List   Diagnosis Code    Subdural hematoma ([address] S06.5X9A    Severe malnutrition (HCC) E43       Isolation/Infection:   Isolation          No Isolation            Nurse Assessment:  Last Vital Signs: BP 84/62   Pulse 70   Temp 97.9 °F (36.6 °C) (Rectal)   Resp 16   Ht 5' 8\" (1.727 m)   Wt 95 lb 0.3 oz (43.1 kg)   SpO2 98%   BMI 14.45 kg/m²     Last documented pain score (0-10 scale): Pain Level: 0  Last Weight:   Wt Readings from Last 1 Encounters:   04/15/19 95 lb 0.3 oz (43.1 kg)     Mental Status:  disoriented    IV Access:  - None    Nursing Mobility/ADLs:  Walking   Dependent  Transfer  Dependent  Bathing  Dependent  Dressing  Dependent  Toileting  Dependent  Feeding  Dependent  Med Admin  Dependent  Med Delivery   crushed    Wound Care Documentation and Therapy:        Elimination:  Continence:   · Bowel:NO  · Bladder: NO  Urinary Catheter: None   Colostomy/Ileostomy/Ileal Conduit: No       Date of Last BM: ***    Intake/Output Summary (Last 24 hours) at 4/16/2019 1444  Last data filed at 4/16/2019 0600  Gross per 24 hour   Intake 4250 ml   Output --   Net 4250 ml     I/O last 3 completed shifts: In: 36 [P.O.:360; I.V.:4250]  Out: -     Safety Concerns:     History of Falls (last 30 days), At Risk for Falls, History of Seizures and Aspiration Risk    Impairments/Disabilities:      MRDD, Nonverbal,     Nutrition Therapy:  Current Nutrition Therapy:   - Oral Diet:  Dysphagia 2 mechanically altered    Routes of Feeding: Oral  Liquids: Honey Thick Liquids  Daily Fluid Restriction: no  Last Modified Barium Swallow with Video (Video Swallowing Test): done on 4/15/19    Treatments at the Time of Hospital Discharge:   Respiratory Treatments:   Oxygen Therapy:  is not on home oxygen therapy.   Ventilator:    - No ventilator support    Rehab Therapies: Physical Therapy, Occupational Therapy and Speech/Language Therapy  Weight Bearing Status/Restrictions: Non-weight bearing on {Right/Left:16} leg  Other Medical Equipment (for information only, NOT a DME order):  wheelchair  Other Treatments:     Patient's personal belongings (please select all that are sent with patient):      RN SIGNATURE:  Electronically signed by Day Leo RN on 4/19/19 at 2:18 PM    CASE MANAGEMENT/SOCIAL WORK SECTION    Inpatient Status Date: ***    Readmission Risk Assessment Score:  Readmission Risk              Risk of Unplanned Readmission:        15           Discharging to Facility/ Agency   · Name:   · Address:  · Phone:  · Fax:    Dialysis Facility (if applicable)   · Name:  · Address:  · Dialysis Schedule:  · Phone:  · Fax:    / signature: {Esignature:[id number]}    PHYSICIAN SECTION    Prognosis: Poor    Condition at Discharge: Stable    Rehab Potential (if transferring to Rehab): Guarded    Recommended Labs or Other Treatments After Discharge: [facility] with eating     Physician Certification: I certify the above information and transfer of Alejandra Villaseñor  is necessary for the continuing treatment of the diagnosis listed and that he requires [facility] for greater 30 days. Update Admission H&P: No change in H&P    PHYSICIAN SIGNATURE:  Electronically signed by Janessa Sorto.  Mary Thomas MD on 4/19/19 at 2:05 PM

## 2019-04-16 NOTE — PLAN OF CARE
Problem: Falls - Risk of:  Goal: Will remain free from falls  Description  Patient has been free from falls and injuries this shift. Xin fall risk assessed each shift. Bed locked in lowest position, alarm engaged. Fall bracelet in place, fall sign present outside door, fall socks present on patient. Room free of clutter. Will continue to round and assess needs.

## 2019-04-17 ENCOUNTER — APPOINTMENT (OUTPATIENT)
Dept: GENERAL RADIOLOGY | Age: 49
DRG: 082 | End: 2019-04-17
Attending: FAMILY MEDICINE
Payer: MEDICARE

## 2019-04-17 LAB
ANION GAP SERPL CALCULATED.3IONS-SCNC: 8 MMOL/L (ref 3–16)
BLOOD CULTURE, ROUTINE: NORMAL
BUN BLDV-MCNC: 23 MG/DL (ref 7–20)
C-PEPTIDE: 1 NG/ML (ref 1.1–4.4)
CALCIUM SERPL-MCNC: 8.3 MG/DL (ref 8.3–10.6)
CHLORIDE BLD-SCNC: 104 MMOL/L (ref 99–110)
CO2: 29 MMOL/L (ref 21–32)
CREAT SERPL-MCNC: 0.7 MG/DL (ref 0.9–1.3)
GFR AFRICAN AMERICAN: >60
GFR NON-AFRICAN AMERICAN: >60
GLUCOSE BLD-MCNC: 105 MG/DL (ref 70–99)
GLUCOSE BLD-MCNC: 139 MG/DL (ref 70–99)
GLUCOSE BLD-MCNC: 73 MG/DL (ref 70–99)
GLUCOSE BLD-MCNC: 73 MG/DL (ref 70–99)
GLUCOSE BLD-MCNC: 79 MG/DL (ref 70–99)
GLUCOSE BLD-MCNC: 90 MG/DL (ref 70–99)
GLUCOSE BLD-MCNC: 97 MG/DL (ref 70–99)
HCT VFR BLD CALC: 29.9 % (ref 40.5–52.5)
HEMOGLOBIN: 10.1 G/DL (ref 13.5–17.5)
INSULIN: 1 UIU/ML
MCH RBC QN AUTO: 30.9 PG (ref 26–34)
MCHC RBC AUTO-ENTMCNC: 33.9 G/DL (ref 31–36)
MCV RBC AUTO: 91.3 FL (ref 80–100)
PDW BLD-RTO: 19.1 % (ref 12.4–15.4)
PERFORMED ON: ABNORMAL
PERFORMED ON: ABNORMAL
PERFORMED ON: NORMAL
PLATELET # BLD: 69 K/UL (ref 135–450)
PMV BLD AUTO: 10.5 FL (ref 5–10.5)
POTASSIUM SERPL-SCNC: 3.7 MMOL/L (ref 3.5–5.1)
RBC # BLD: 3.27 M/UL (ref 4.2–5.9)
SODIUM BLD-SCNC: 141 MMOL/L (ref 136–145)
WBC # BLD: 3 K/UL (ref 4–11)

## 2019-04-17 PROCEDURE — 6370000000 HC RX 637 (ALT 250 FOR IP): Performed by: STUDENT IN AN ORGANIZED HEALTH CARE EDUCATION/TRAINING PROGRAM

## 2019-04-17 PROCEDURE — 2580000003 HC RX 258: Performed by: STUDENT IN AN ORGANIZED HEALTH CARE EDUCATION/TRAINING PROGRAM

## 2019-04-17 PROCEDURE — 85027 COMPLETE CBC AUTOMATED: CPT

## 2019-04-17 PROCEDURE — 6360000002 HC RX W HCPCS: Performed by: INTERNAL MEDICINE

## 2019-04-17 PROCEDURE — 2500000003 HC RX 250 WO HCPCS: Performed by: STUDENT IN AN ORGANIZED HEALTH CARE EDUCATION/TRAINING PROGRAM

## 2019-04-17 PROCEDURE — 80048 BASIC METABOLIC PNL TOTAL CA: CPT

## 2019-04-17 PROCEDURE — 36415 COLL VENOUS BLD VENIPUNCTURE: CPT

## 2019-04-17 PROCEDURE — 71045 X-RAY EXAM CHEST 1 VIEW: CPT

## 2019-04-17 PROCEDURE — 6360000002 HC RX W HCPCS: Performed by: STUDENT IN AN ORGANIZED HEALTH CARE EDUCATION/TRAINING PROGRAM

## 2019-04-17 PROCEDURE — 1200000000 HC SEMI PRIVATE

## 2019-04-17 RX ORDER — LORAZEPAM 2 MG/ML
1 INJECTION INTRAMUSCULAR 2 TIMES DAILY
Status: DISCONTINUED | OUTPATIENT
Start: 2019-04-17 | End: 2019-04-19 | Stop reason: HOSPADM

## 2019-04-17 RX ORDER — 0.9 % SODIUM CHLORIDE 0.9 %
500 INTRAVENOUS SOLUTION INTRAVENOUS ONCE
Status: COMPLETED | OUTPATIENT
Start: 2019-04-17 | End: 2019-04-17

## 2019-04-17 RX ORDER — LEVOTHYROXINE SODIUM ANHYDROUS 100 UG/5ML
62.5 INJECTION, POWDER, LYOPHILIZED, FOR SOLUTION INTRAVENOUS DAILY
Status: DISCONTINUED | OUTPATIENT
Start: 2019-04-17 | End: 2019-04-19 | Stop reason: HOSPADM

## 2019-04-17 RX ORDER — LEVOTHYROXINE SODIUM ANHYDROUS 100 UG/5ML
125 INJECTION, POWDER, LYOPHILIZED, FOR SOLUTION INTRAVENOUS DAILY
Status: DISCONTINUED | OUTPATIENT
Start: 2019-04-17 | End: 2019-04-17

## 2019-04-17 RX ORDER — 0.9 % SODIUM CHLORIDE 0.9 %
5 VIAL (ML) INJECTION DAILY
Status: DISCONTINUED | OUTPATIENT
Start: 2019-04-17 | End: 2019-04-17

## 2019-04-17 RX ORDER — 0.9 % SODIUM CHLORIDE 0.9 %
5 VIAL (ML) INJECTION DAILY
Status: DISCONTINUED | OUTPATIENT
Start: 2019-04-17 | End: 2019-04-19 | Stop reason: HOSPADM

## 2019-04-17 RX ORDER — DEXTROSE AND SODIUM CHLORIDE 5; .45 G/100ML; G/100ML
INJECTION, SOLUTION INTRAVENOUS CONTINUOUS
Status: DISCONTINUED | OUTPATIENT
Start: 2019-04-17 | End: 2019-04-18

## 2019-04-17 RX ADMIN — Medication 5 ML: at 16:36

## 2019-04-17 RX ADMIN — LORAZEPAM 1 MG: 2 INJECTION INTRAMUSCULAR; INTRAVENOUS at 07:52

## 2019-04-17 RX ADMIN — AMPICILLIN AND SULBACTAM 1.5 G: 1; .5 INJECTION, POWDER, FOR SOLUTION INTRAMUSCULAR; INTRAVENOUS at 13:52

## 2019-04-17 RX ADMIN — LEVOTHYROXINE SODIUM ANHYDROUS 62.5 MCG: 100 INJECTION, POWDER, LYOPHILIZED, FOR SOLUTION INTRAVENOUS at 16:35

## 2019-04-17 RX ADMIN — Medication 10 ML: at 07:52

## 2019-04-17 RX ADMIN — DEXTROSE AND SODIUM CHLORIDE: 5; 450 INJECTION, SOLUTION INTRAVENOUS at 05:02

## 2019-04-17 RX ADMIN — CLONAZEPAM 0.5 MG: 0.5 TABLET ORAL at 07:51

## 2019-04-17 RX ADMIN — LORAZEPAM 1 MG: 2 INJECTION INTRAMUSCULAR; INTRAVENOUS at 01:40

## 2019-04-17 RX ADMIN — LEVETIRACETAM 500 MG: 500 TABLET ORAL at 07:51

## 2019-04-17 RX ADMIN — LEVETIRACETAM 500 MG: 100 INJECTION, SOLUTION INTRAVENOUS at 20:18

## 2019-04-17 RX ADMIN — DEXTROSE AND SODIUM CHLORIDE: 5; 450 INJECTION, SOLUTION INTRAVENOUS at 20:18

## 2019-04-17 RX ADMIN — LORAZEPAM 1 MG: 2 INJECTION INTRAMUSCULAR; INTRAVENOUS at 16:32

## 2019-04-17 RX ADMIN — CARBAMAZEPINE 200 MG: 200 TABLET ORAL at 07:51

## 2019-04-17 RX ADMIN — TAMSULOSIN HYDROCHLORIDE 0.4 MG: 0.4 CAPSULE ORAL at 07:52

## 2019-04-17 RX ADMIN — DIVALPROEX SODIUM 250 MG: 125 CAPSULE, COATED PELLETS ORAL at 08:21

## 2019-04-17 RX ADMIN — VALPROATE SODIUM 125 MG: 100 INJECTION, SOLUTION INTRAVENOUS at 22:41

## 2019-04-17 RX ADMIN — LEVOTHYROXINE SODIUM 125 MCG: 0.12 TABLET ORAL at 06:09

## 2019-04-17 RX ADMIN — SODIUM CHLORIDE 500 ML: 9 INJECTION, SOLUTION INTRAVENOUS at 12:40

## 2019-04-17 RX ADMIN — DEXTROSE AND SODIUM CHLORIDE: 5; 450 INJECTION, SOLUTION INTRAVENOUS at 04:10

## 2019-04-17 RX ADMIN — AMPICILLIN AND SULBACTAM 1.5 G: 1; .5 INJECTION, POWDER, FOR SOLUTION INTRAMUSCULAR; INTRAVENOUS at 21:00

## 2019-04-17 ASSESSMENT — PAIN SCALES - PAIN ASSESSMENT IN ADVANCED DEMENTIA (PAINAD)
BODYLANGUAGE: 0
FACIALEXPRESSION: 0
FACIALEXPRESSION: 0
BREATHING: 0
NEGVOCALIZATION: 0
FACIALEXPRESSION: 0
NEGVOCALIZATION: 0
FACIALEXPRESSION: 0
CONSOLABILITY: 0
NEGVOCALIZATION: 0
BODYLANGUAGE: 0
NEGVOCALIZATION: 0
BODYLANGUAGE: 0
NEGVOCALIZATION: 0
TOTALSCORE: 0
BODYLANGUAGE: 0
CONSOLABILITY: 0
TOTALSCORE: 0
BREATHING: 0
BREATHING: 0
CONSOLABILITY: 0
NEGVOCALIZATION: 0
CONSOLABILITY: 0
CONSOLABILITY: 0
FACIALEXPRESSION: 0
NEGVOCALIZATION: 0
BODYLANGUAGE: 0
BODYLANGUAGE: 0
FACIALEXPRESSION: 0
CONSOLABILITY: 0
BREATHING: 0
BODYLANGUAGE: 0
TOTALSCORE: 0
BREATHING: 0
NEGVOCALIZATION: 0
BODYLANGUAGE: 0
TOTALSCORE: 0
FACIALEXPRESSION: 0
CONSOLABILITY: 0
BREATHING: 0
TOTALSCORE: 0
BREATHING: 0
BREATHING: 0
TOTALSCORE: 0
CONSOLABILITY: 0
FACIALEXPRESSION: 0

## 2019-04-17 NOTE — PROGRESS NOTES
Speech Language Pathology  Dysphagia - contact note    Rn reports problems with pt taking medications this morning. Attempted to re-assess pt bedside, pt very lethargic/decreased alertness. Provided tactile and verbal time, does not open eyes. Placed spoon to mouth, pt did open mouth, small amount of puree placed in oral cavity. Pt did not manipulate or propel bolus, removed from mouth with yankauer. Pt too lethargic to safely take PO at this time. Recommend NPO due to decreased alertness/lethargy at this time. If pt becomes more alert, resume puree/honey thick via tsp/straw. D/w Nickolas Heller M.S./[facility], [place]-SLP #[id number]  Pg.  # [phone]

## 2019-04-17 NOTE — PROGRESS NOTES
Palliative Care Chart Review  and Check in Note:     NAME:  Margie Oliva  Admit Date: 4/13/2019  Hospital Day:  Hospital Day: 5   Current Code status: Full Code    Palliative care is continuing to following Mr. Armaan Nayak for symptom management,  and goals of care discussion as needed. Patient's chart reviewed today 4/17/19. Discussed case with Dr. Kamron Jeronimo. Physicians are completing guardianship agency's paperwork for code status change today. Called and left a message for guardian Saul Massey to discuss initiating a hospice referral in light of pt's worsening status today. Waiting for a call back. Addendum: Received a call back from [place] who will check with her supervisor regarding hospice referral policy. Addendum: Faxed DNR request form to pt's guardian agency. Spoke with [place] again regarding a hospice referral and she states a hospice consult can be initiated by the hospital physician and then hospice will reach out to the guardian. D/w RN MILKA Ho who will make a hospice referral.    The following are the currently established goals/code status, and Symptom management. Goals of care: Will discuss comfort care with guardian. Code status: Full Code, physicians to sign guardianship paperwork for code status change today. Discharge plan: Likely will return to his group home when medically ready, possibly with hospice.     Jaycee Elizondo NP  [address] [employer]

## 2019-04-17 NOTE — PROGRESS NOTES
Pt with difficulty swallowing meds with ordered diet. Concern for aspiration. Sats high 80s-low 90s on RA. Pt repositioned, sats now sustained in low 90s. Resident contacted, and came to bedside. CXR ordered. Will continue to monitor. Speech therapy contacted to assess.

## 2019-04-17 NOTE — PROGRESS NOTES
Pt with several very large incont. occurences overnight that soaked through brief/bed pain/linens. Pt also with increased cough and secretions with AM. Concerned for overhydration. Notified Internal medicine resident on call - Dr. Aspen Rodgers. IV fluids lowered to 50 mL/hr. Will continue to monitor.

## 2019-04-17 NOTE — PROGRESS NOTES
Internal Medicine PGY-1 Resident Progress Note        PCP: No primary care provider on file. Date of Admission: 4/13/2019    Chief Complaint: Altered mental status; lethargy     Hospital Course (per HPI): Lucy madrid 46 y. o. male w/ PMH severe MRDD, seizures, schizophrenia, hypothyroidism, BPH who presents with increased lethargy, decreased appetite and new bruises that found was admitted for symptomatic worsening of subacute left hemispheric subdural hematoma (CT Head 4/12 acute L sided 1 cm SDH with mild midline shift). Etiology was unclear as history is limited from patient's severe MRDD, but was likely trauma secondary to another fall (recent SDH 2/2 fall on 4/3). Subjective: Patient was seen and examined this AM. He became hypothermic this morning (Tmax 94) requiring bear-hugger placement. He appears more somnolent today and has new bibasilar crackles on ausculation. Repeat chest x-ray was ordered to assess for possibility of new infiltrates. Spoke with care-giver yesterday; plan to initiate process of making patient DNR-CC and transition to comfort care.      Medications:  Reviewed    Infusion Medications    dextrose 5 % and 0.45 % NaCl 50 mL/hr at 04/17/19 0502    dextrose       Scheduled Medications    levothyroxine  125 mcg Oral Daily    enoxaparin  40 mg Subcutaneous Daily    levETIRAcetam  500 mg Oral BID    divalproex  250 mg Oral BID    ARIPiprazole  20 mg Oral Nightly    carBAMazepine  200 mg Oral BID    clonazePAM  1 mg Oral Nightly    [Held by provider] lactulose  20 g Oral BID    mirtazapine  30 mg Oral Nightly    tamsulosin  0.4 mg Oral Daily    sodium chloride flush  10 mL Intravenous 2 times per day    clonazePAM  0.5 mg Oral BID     PRN Meds: LORazepam, sodium chloride flush, acetaminophen, magnesium hydroxide, ondansetron, labetalol, glucose, glucagon (rDNA), dextrose, dextrose      Intake/Output Summary (Last 24 hours) at 4/17/2019 0932  Last data filed at 4/17/2019 8230  Gross per 24 hour   Intake 6523 ml   Output 0 ml   Net 6523 ml       Physical Exam Performed:    /83   Pulse 58   Temp 94 °F (34.4 °C) (Temporal) Comment: olga lidia ramirez applied  Resp 15   Ht 5' 8\" (1.727 m)   Wt 95 lb 0.3 oz (43.1 kg)   SpO2 92%   BMI 14.45 kg/m²     General appearance: Non-verbal at baseline  HEENT: Pupils equal, round, and reactive to light. Conjunctivae/corneas clear. Respiratory: Bibasilar crackles appreciated. Cardiovascular: Regular rate and rhythm with normal S1/S2 without murmurs, rubs or gallops. Abdomen: Soft, non-tender, non-distended with normal bowel sounds. Musculoskeletal: No clubbing, cyanosis or edema bilaterally. Full range of motion without deformity. Skin: Abrasions present  Neurologic:  Limited exam due to mental status. Able to spontaeously move UE and LE. Peripheral Pulses: +2 palpable, equal bilaterally     Labs:   Recent Labs     04/15/19  0425 04/16/19  0538 04/17/19  0402   WBC 8.1 3.6* 3.0*   HGB 12.6* 9.6* 10.1*   HCT 39.4* 28.6* 29.9*   * 72* 69*     Recent Labs     04/15/19  0425 04/16/19  0538 04/17/19  0402    141 141   K 4.3 3.6 3.7    105 104   CO2 28 28 29   BUN 39* 32* 23*   CREATININE 0.9 0.8* 0.7*   CALCIUM 9.6 8.2* 8.3   PHOS 3.4  --   --      No results for input(s): AST, ALT, BILIDIR, BILITOT, ALKPHOS in the last 72 hours. No results for input(s): INR in the last 72 hours. No results for input(s): [place] Daily in the last 72 hours. Urinalysis:      Lab Results   Component Value Date    NITRU Negative 04/12/2019    WBCUA 6 04/12/2019    RBCUA 18 04/12/2019    BLOODU SMALL 04/12/2019    SPECGRAV 1.025 04/12/2019    GLUCOSEU Negative 04/12/2019       Radiology:  FL MODIFIED BARIUM SWALLOW W VIDEO   Final Result      Penetration and aspiration with nectar. Refer to speech pathologist note for dietary recommendations. CT HEAD WO CONTRAST   Final Result      No significant interval change.       Stable left cerebral convexity subdural hematoma measuring up to 1.5 cm in thickness with mass effect and 6 mm midline shift to right. Slight extension into left parafalcine region and along left tentorial leaflet. Stable small right anterior temporal convexity subdural hematoma measuring 3 mm in thickness. CT head without contrast   Final Result     No change          XR CHEST PORTABLE    (Results Pending)       Assessment/Plan:  Patient is a 78-year-old male with an extensive past medical history who [facility] after being found to be increasingly lethargic by his caregiver. Admitted for:     Subdural hematoma  - Repeat Head CT Stable  - NSGY following; patient stable for discharge    - SBP < 160, labetalol PRN q6H  - keep HOB > 30  - Neuro check q4h  - PT/OT  - SLP following; recommended Dysphagia I diet with honey thick liquids     Hypoglycemia -Patient has been persistently hypoglycemic for unclear reasons.   -Pro-insulin pending   -Insulin slightly below normal at 1.0   -C-peptide pending   -Sulfonylurea screen pending   -TSH elevated at 6.51  -Free T4 within normal limits   -Beta hydroxybutyrate within normal limits at 0.08   -Continue Accuchecks      SAUL, resoled  -Most recent creatinine 0.7     MRDD  -Baseline non-verbal      Seizures  H/o seizures. On carbamazepine, keppra, depakote. In ED, found to be subtherapeutic on carbamazepine and valproic acid. - Continue home PO carbamazepine 200 BID, depakote 250 AM, 500 PM, keppra 500 BID. Hypothyroidism  -Continue levothyroxine to 125 mcg daily      BPH  - Continue home Flomax      DVT Prophylaxis: Lovenox (okay to start 4/15/19 per neurosurgery)   Diet: DIET DYSPHAGIA I PUREED;  Honey Thick  Dietary Nutrition Supplements: Frozen Oral Supplement  Code Status: Full Code    PT/OT Eval Status: Consulted     Dispo - General medicine/surgery floors     I will discuss the patient with the senior resident and

## 2019-04-18 LAB
ANION GAP SERPL CALCULATED.3IONS-SCNC: 8 MMOL/L (ref 3–16)
BUN BLDV-MCNC: 19 MG/DL (ref 7–20)
CALCIUM SERPL-MCNC: 8.4 MG/DL (ref 8.3–10.6)
CHLORIDE BLD-SCNC: 102 MMOL/L (ref 99–110)
CO2: 30 MMOL/L (ref 21–32)
CREAT SERPL-MCNC: 0.6 MG/DL (ref 0.9–1.3)
GFR AFRICAN AMERICAN: >60
GFR NON-AFRICAN AMERICAN: >60
GLUCOSE BLD-MCNC: 71 MG/DL (ref 70–99)
GLUCOSE BLD-MCNC: 72 MG/DL (ref 70–99)
GLUCOSE BLD-MCNC: 82 MG/DL (ref 70–99)
GLUCOSE BLD-MCNC: 84 MG/DL (ref 70–99)
GLUCOSE BLD-MCNC: 86 MG/DL (ref 70–99)
GLUCOSE BLD-MCNC: 87 MG/DL (ref 70–99)
HCT VFR BLD CALC: 29.2 % (ref 40.5–52.5)
HEMOGLOBIN: 9.8 G/DL (ref 13.5–17.5)
MCH RBC QN AUTO: 30.5 PG (ref 26–34)
MCHC RBC AUTO-ENTMCNC: 33.6 G/DL (ref 31–36)
MCV RBC AUTO: 90.7 FL (ref 80–100)
PDW BLD-RTO: 18.8 % (ref 12.4–15.4)
PERFORMED ON: NORMAL
PLATELET # BLD: 73 K/UL (ref 135–450)
PMV BLD AUTO: 10.2 FL (ref 5–10.5)
POTASSIUM SERPL-SCNC: 3.5 MMOL/L (ref 3.5–5.1)
RBC # BLD: 3.22 M/UL (ref 4.2–5.9)
SODIUM BLD-SCNC: 140 MMOL/L (ref 136–145)
WBC # BLD: 2.1 K/UL (ref 4–11)

## 2019-04-18 PROCEDURE — 2500000003 HC RX 250 WO HCPCS: Performed by: STUDENT IN AN ORGANIZED HEALTH CARE EDUCATION/TRAINING PROGRAM

## 2019-04-18 PROCEDURE — 6360000002 HC RX W HCPCS: Performed by: STUDENT IN AN ORGANIZED HEALTH CARE EDUCATION/TRAINING PROGRAM

## 2019-04-18 PROCEDURE — 2580000003 HC RX 258: Performed by: STUDENT IN AN ORGANIZED HEALTH CARE EDUCATION/TRAINING PROGRAM

## 2019-04-18 PROCEDURE — 85027 COMPLETE CBC AUTOMATED: CPT

## 2019-04-18 PROCEDURE — 6360000002 HC RX W HCPCS: Performed by: INTERNAL MEDICINE

## 2019-04-18 PROCEDURE — 1200000000 HC SEMI PRIVATE

## 2019-04-18 PROCEDURE — 80048 BASIC METABOLIC PNL TOTAL CA: CPT

## 2019-04-18 PROCEDURE — 36415 COLL VENOUS BLD VENIPUNCTURE: CPT

## 2019-04-18 RX ORDER — LORAZEPAM 2 MG/ML
1 INJECTION INTRAMUSCULAR ONCE
Status: COMPLETED | OUTPATIENT
Start: 2019-04-18 | End: 2019-04-18

## 2019-04-18 RX ADMIN — LORAZEPAM 1 MG: 2 INJECTION INTRAMUSCULAR; INTRAVENOUS at 01:30

## 2019-04-18 RX ADMIN — LORAZEPAM 1 MG: 2 INJECTION INTRAMUSCULAR; INTRAVENOUS at 08:27

## 2019-04-18 RX ADMIN — LEVOTHYROXINE SODIUM ANHYDROUS 62.5 MCG: 100 INJECTION, POWDER, LYOPHILIZED, FOR SOLUTION INTRAVENOUS at 08:27

## 2019-04-18 RX ADMIN — ENOXAPARIN SODIUM 40 MG: 40 INJECTION SUBCUTANEOUS at 08:27

## 2019-04-18 RX ADMIN — LORAZEPAM 1 MG: 2 INJECTION INTRAMUSCULAR; INTRAVENOUS at 16:19

## 2019-04-18 RX ADMIN — LEVETIRACETAM 500 MG: 100 INJECTION, SOLUTION INTRAVENOUS at 08:07

## 2019-04-18 RX ADMIN — AMPICILLIN AND SULBACTAM 1.5 G: 1; .5 INJECTION, POWDER, FOR SOLUTION INTRAMUSCULAR; INTRAVENOUS at 13:06

## 2019-04-18 RX ADMIN — LORAZEPAM 1 MG: 2 INJECTION INTRAMUSCULAR; INTRAVENOUS at 10:11

## 2019-04-18 RX ADMIN — LORAZEPAM 1 MG: 2 INJECTION INTRAMUSCULAR; INTRAVENOUS at 00:27

## 2019-04-18 RX ADMIN — VALPROATE SODIUM 125 MG: 100 INJECTION, SOLUTION INTRAVENOUS at 21:17

## 2019-04-18 RX ADMIN — VALPROATE SODIUM 125 MG: 100 INJECTION, SOLUTION INTRAVENOUS at 02:33

## 2019-04-18 RX ADMIN — VALPROATE SODIUM 125 MG: 100 INJECTION, SOLUTION INTRAVENOUS at 16:14

## 2019-04-18 RX ADMIN — AMPICILLIN AND SULBACTAM 1.5 G: 1; .5 INJECTION, POWDER, FOR SOLUTION INTRAMUSCULAR; INTRAVENOUS at 06:35

## 2019-04-18 RX ADMIN — LORAZEPAM 1 MG: 2 INJECTION INTRAMUSCULAR; INTRAVENOUS at 06:33

## 2019-04-18 RX ADMIN — LEVETIRACETAM 500 MG: 100 INJECTION, SOLUTION INTRAVENOUS at 21:17

## 2019-04-18 RX ADMIN — Medication 10 ML: at 21:00

## 2019-04-18 RX ADMIN — AMPICILLIN AND SULBACTAM 1.5 G: 1; .5 INJECTION, POWDER, FOR SOLUTION INTRAMUSCULAR; INTRAVENOUS at 01:19

## 2019-04-18 RX ADMIN — Medication 10 ML: at 08:29

## 2019-04-18 RX ADMIN — VALPROATE SODIUM 125 MG: 100 INJECTION, SOLUTION INTRAVENOUS at 08:29

## 2019-04-18 RX ADMIN — LORAZEPAM 1 MG: 2 INJECTION INTRAMUSCULAR; INTRAVENOUS at 02:29

## 2019-04-18 RX ADMIN — Medication 5 ML: at 08:27

## 2019-04-18 RX ADMIN — AMPICILLIN AND SULBACTAM 1.5 G: 1; .5 INJECTION, POWDER, FOR SOLUTION INTRAMUSCULAR; INTRAVENOUS at 18:55

## 2019-04-18 ASSESSMENT — PAIN SCALES - GENERAL
PAINLEVEL_OUTOF10: 2
PAINLEVEL_OUTOF10: 0
PAINLEVEL_OUTOF10: 1

## 2019-04-18 ASSESSMENT — PAIN SCALES - PAIN ASSESSMENT IN ADVANCED DEMENTIA (PAINAD)
NEGVOCALIZATION: 1
BODYLANGUAGE: 1
TOTALSCORE: 2
FACIALEXPRESSION: 0
CONSOLABILITY: 0
BREATHING: 0

## 2019-04-18 ASSESSMENT — PAIN SCALES - WONG BAKER: WONGBAKER_NUMERICALRESPONSE: 0

## 2019-04-18 NOTE — PROGRESS NOTES
Palliative Care Chart Review  and Check in Note:     NAME:  Herbie Pelaez  Admit Date: 4/13/2019  Hospital Day:  Hospital Day: 6   Current Code status: Full Code    Palliative care is continuing to following Mr. Christiano Harkins for symptom management,  and goals of care discussion as needed. Patient's chart reviewed today 4/18/19. The following are the currently established goals/code status, and Symptom management. Goals of care: PC still awaiting a call from guardian regarding code status. No decision made yet from the physician at the [facility]. [facility] hospice representative present and in the room and working on a plan. Later entry: 1426, PC contacted [facility] guardian again to see what the status was of patient's code status and hospice plan. She reported that she has yet to here from the [facility] and she hasn't gotten approval for the DNR, PC asked her to please check and contact PC or Batool Villegas RN/CM regarding the plan. Code status: Full code currently. Discharge plan: back to [facility] with [facility] hospice services.     Pinky Baldwin, DMITRY/CNP  Palliative Care  [phone]

## 2019-04-18 NOTE — PROGRESS NOTES
Pt agitated and trying to get out of bed. PRN ativan given. Continuously swinging legs over side rail. Medical resident notified. New order for one time dose of ativan.

## 2019-04-18 NOTE — PROGRESS NOTES
Internal Medicine PGY-1 Resident Progress Note        PCP: No primary care provider on file. Date of Admission: 4/13/2019    Chief Complaint: Altered mental status; lethargy     Hospital Course (per HPI): Hany Patel a 46 y. o. male w/ PMH severe MRDD, seizures, schizophrenia, hypothyroidism, BPH who presents with increased lethargy, decreased appetite and new bruises that found was admitted for symptomatic worsening of subacute left hemispheric subdural hematoma (CT Head 4/12 acute L sided 1 cm SDH with mild midline shift). Etiology was unclear as history is limited from patient's severe MRDD, but was likely trauma secondary to another fall (recent SDH 2/2 fall on 4/3). Subjective: Patient was seen and examined this AM. Overnight, he became agitated and required an extra dose of lorazepam. Spoke with Hospice representative this morning; plan to discharge patient back to his group home with Hospice pending a decision from the group home's physician. Plan to continue current management.      Medications:  Reviewed    Infusion Medications    dextrose       Scheduled Medications    levetiracetam  500 mg Intravenous Q12H    ampicillin-sulbactam  1.5 g Intravenous Q6H    levothyroxine  62.5 mcg Intravenous Daily    And    sodium chloride (PF)  5 mL Intravenous Daily    valproate sodium (DEPACON) IVPB  125 mg Intravenous Q6H    LORazepam  1 mg Intravenous BID    enoxaparin  40 mg Subcutaneous Daily    ARIPiprazole  20 mg Oral Nightly    carBAMazepine  200 mg Oral BID    mirtazapine  30 mg Oral Nightly    tamsulosin  0.4 mg Oral Daily    sodium chloride flush  10 mL Intravenous 2 times per day     PRN Meds: LORazepam, sodium chloride flush, magnesium hydroxide, ondansetron, labetalol, glucose, glucagon (rDNA), dextrose, dextrose      Intake/Output Summary (Last 24 hours) at 4/18/2019 1255  Last data filed at 4/18/2019 1123  Gross per 24 hour   Intake 1557 ml   Output --   Net 1557 ml       Physical Exam Performed:    /84   Pulse 60   Temp 94 °F (34.4 °C) (Temporal)   Resp 18   Ht 5' 8\" (1.727 m)   Wt 101 lb 6.6 oz (46 kg)   SpO2 100%   BMI 15.42 kg/m²     General appearance: Non-verbal at baseline  HEENT: Pupils equal, round, and reactive to light. Conjunctivae/corneas clear. Respiratory: Bibasilar crackles appreciated. Cardiovascular: Regular rate and rhythm with normal S1/S2 without murmurs, rubs or gallops. Abdomen: Soft, non-tender, non-distended with normal bowel sounds. Musculoskeletal: No clubbing, cyanosis or edema bilaterally. Full range of motion without deformity. Skin: Abrasions present  Neurologic:  Limited exam due to mental status. Able to spontaeously move UE and LE. Peripheral Pulses: +2 palpable, equal bilaterally     Labs:   Recent Labs     04/16/19  0538 04/17/19  0402 04/18/19  0447   WBC 3.6* 3.0* 2.1*   HGB 9.6* 10.1* 9.8*   HCT 28.6* 29.9* 29.2*   PLT 72* 69* 73*     Recent Labs     04/16/19  0538 04/17/19  0402 04/18/19  0447    141 140   K 3.6 3.7 3.5    104 102   CO2 28 29 30   BUN 32* 23* 19   CREATININE 0.8* 0.7* 0.6*   CALCIUM 8.2* 8.3 8.4     No results for input(s): AST, ALT, BILIDIR, BILITOT, ALKPHOS in the last 72 hours. No results for input(s): INR in the last 72 hours. No results for input(s): [place] in the last 72 hours. Urinalysis:      Lab Results   Component Value Date    NITRU Negative 04/12/2019    WBCUA 6 04/12/2019    RBCUA 18 04/12/2019    BLOODU SMALL 04/12/2019    SPECGRAV 1.025 04/12/2019    GLUCOSEU Negative 04/12/2019       Radiology:  XR CHEST PORTABLE   Final Result   Impression:      Development of right lower lobe consolidation/pneumonia      FL MODIFIED BARIUM SWALLOW W VIDEO   Final Result      Penetration and aspiration with nectar. Refer to speech pathologist note for dietary recommendations. CT HEAD WO CONTRAST   Final Result      No significant interval change.       Stable left cerebral convexity bed side. Elmer Jeronimo M.D

## 2019-04-18 NOTE — PROGRESS NOTES
Speech Language Pathology  Dysphagia - contact note    Chart reviewed, d/w RN who states pt is most likely being made hospice. Pt n/a at this time for re-assessment. Will monitor status and follow up if status changes. Yeni Angelo M.S./CCC-SLP #[id number]  Pg.  # [phone]

## 2019-04-18 NOTE — PROGRESS NOTES
Pt very drowsy and has been sleeping comfortably. Not following commands. Moves all extremities. Naheed hugger in place due to hypothermia. RN will continue to monitor for changes.

## 2019-04-19 ENCOUNTER — HOSPITAL ENCOUNTER (INPATIENT)
Age: 49
LOS: 3 days | Discharge: HOSPICE | DRG: 082 | End: 2019-04-22
Attending: INTERNAL MEDICINE | Admitting: RADIOLOGY
Payer: COMMERCIAL

## 2019-04-19 VITALS
HEART RATE: 59 BPM | TEMPERATURE: 96.5 F | RESPIRATION RATE: 15 BRPM | SYSTOLIC BLOOD PRESSURE: 131 MMHG | HEIGHT: 68 IN | DIASTOLIC BLOOD PRESSURE: 90 MMHG | BODY MASS INDEX: 15.1 KG/M2 | WEIGHT: 99.65 LBS | OXYGEN SATURATION: 97 %

## 2019-04-19 DIAGNOSIS — S06.5XAA SUBDURAL HEMATOMA: ICD-10-CM

## 2019-04-19 DIAGNOSIS — Z51.5 HOSPICE CARE: Primary | ICD-10-CM

## 2019-04-19 LAB
ACETOHEXAMIDE: NOT DETECTED NG/ML
ANION GAP SERPL CALCULATED.3IONS-SCNC: 9 MMOL/L (ref 3–16)
BUN BLDV-MCNC: 21 MG/DL (ref 7–20)
CALCIUM SERPL-MCNC: 8.9 MG/DL (ref 8.3–10.6)
CHLORIDE BLD-SCNC: 101 MMOL/L (ref 99–110)
CHLORPROPAMIDE: NOT DETECTED NG/ML
CO2: 28 MMOL/L (ref 21–32)
CREAT SERPL-MCNC: 0.6 MG/DL (ref 0.9–1.3)
GFR AFRICAN AMERICAN: >60
GFR NON-AFRICAN AMERICAN: >60
GLIMEPIRIDE: NOT DETECTED NG/ML
GLIPIZIDE: NOT DETECTED NG/ML
GLUCOSE BLD-MCNC: 138 MG/DL (ref 70–99)
GLUCOSE BLD-MCNC: 52 MG/DL (ref 70–99)
GLUCOSE BLD-MCNC: 61 MG/DL (ref 70–99)
GLUCOSE BLD-MCNC: 64 MG/DL (ref 70–99)
GLUCOSE BLD-MCNC: 71 MG/DL (ref 70–99)
GLUCOSE BLD-MCNC: 75 MG/DL (ref 70–99)
GLUCOSE BLD-MCNC: 94 MG/DL (ref 70–99)
GLYBURIDE: NOT DETECTED NG/ML
HCT VFR BLD CALC: 36.1 % (ref 40.5–52.5)
HEMOGLOBIN: 12.1 G/DL (ref 13.5–17.5)
MCH RBC QN AUTO: 30.6 PG (ref 26–34)
MCHC RBC AUTO-ENTMCNC: 33.5 G/DL (ref 31–36)
MCV RBC AUTO: 91.4 FL (ref 80–100)
NATEGLINIDE: NOT DETECTED NG/ML
PDW BLD-RTO: 19 % (ref 12.4–15.4)
PERFORMED ON: ABNORMAL
PERFORMED ON: NORMAL
PLATELET # BLD: 86 K/UL (ref 135–450)
PMV BLD AUTO: 10 FL (ref 5–10.5)
POTASSIUM SERPL-SCNC: 4.2 MMOL/L (ref 3.5–5.1)
RBC # BLD: 3.94 M/UL (ref 4.2–5.9)
REPAGLINIDE: NOT DETECTED NG/ML
SODIUM BLD-SCNC: 138 MMOL/L (ref 136–145)
TOLAZAMIDE: NOT DETECTED NG/ML
TOLBUTAMIDE: NOT DETECTED NG/ML
WBC # BLD: 2.9 K/UL (ref 4–11)

## 2019-04-19 PROCEDURE — 2580000003 HC RX 258: Performed by: STUDENT IN AN ORGANIZED HEALTH CARE EDUCATION/TRAINING PROGRAM

## 2019-04-19 PROCEDURE — 92526 ORAL FUNCTION THERAPY: CPT

## 2019-04-19 PROCEDURE — 2500000003 HC RX 250 WO HCPCS: Performed by: STUDENT IN AN ORGANIZED HEALTH CARE EDUCATION/TRAINING PROGRAM

## 2019-04-19 PROCEDURE — 6360000002 HC RX W HCPCS: Performed by: STUDENT IN AN ORGANIZED HEALTH CARE EDUCATION/TRAINING PROGRAM

## 2019-04-19 PROCEDURE — 1250000000 HC SEMI PRIVATE HOSPICE R&B

## 2019-04-19 PROCEDURE — 36415 COLL VENOUS BLD VENIPUNCTURE: CPT

## 2019-04-19 PROCEDURE — 80048 BASIC METABOLIC PNL TOTAL CA: CPT

## 2019-04-19 PROCEDURE — 85027 COMPLETE CBC AUTOMATED: CPT

## 2019-04-19 RX ORDER — LORAZEPAM 2 MG/ML
1 INJECTION INTRAMUSCULAR EVERY 6 HOURS
Status: DISCONTINUED | OUTPATIENT
Start: 2019-04-19 | End: 2019-04-22 | Stop reason: HOSPADM

## 2019-04-19 RX ORDER — ONDANSETRON 2 MG/ML
4 INJECTION INTRAMUSCULAR; INTRAVENOUS EVERY 6 HOURS PRN
Status: DISCONTINUED | OUTPATIENT
Start: 2019-04-19 | End: 2019-04-22 | Stop reason: HOSPADM

## 2019-04-19 RX ORDER — DEXTROSE, SODIUM CHLORIDE, AND POTASSIUM CHLORIDE 5; .45; .15 G/100ML; G/100ML; G/100ML
INJECTION INTRAVENOUS CONTINUOUS
Status: DISCONTINUED | OUTPATIENT
Start: 2019-04-19 | End: 2019-04-19 | Stop reason: HOSPADM

## 2019-04-19 RX ORDER — LEVOTHYROXINE SODIUM 0.12 MG/1
125 TABLET ORAL DAILY
Qty: 30 TABLET | Refills: 0 | Status: SHIPPED | OUTPATIENT
Start: 2019-04-19

## 2019-04-19 RX ORDER — AMOXICILLIN AND CLAVULANATE POTASSIUM 875; 125 MG/1; MG/1
1 TABLET, FILM COATED ORAL 2 TIMES DAILY
Qty: 14 TABLET | Refills: 0 | Status: ON HOLD | OUTPATIENT
Start: 2019-04-19 | End: 2019-04-22 | Stop reason: HOSPADM

## 2019-04-19 RX ORDER — MORPHINE SULFATE 2 MG/ML
1 INJECTION, SOLUTION INTRAMUSCULAR; INTRAVENOUS EVERY 4 HOURS PRN
Status: DISCONTINUED | OUTPATIENT
Start: 2019-04-19 | End: 2019-04-22 | Stop reason: HOSPADM

## 2019-04-19 RX ADMIN — AMPICILLIN AND SULBACTAM 1.5 G: 1; .5 INJECTION, POWDER, FOR SOLUTION INTRAMUSCULAR; INTRAVENOUS at 07:37

## 2019-04-19 RX ADMIN — LORAZEPAM 1 MG: 2 INJECTION INTRAMUSCULAR; INTRAVENOUS at 08:10

## 2019-04-19 RX ADMIN — VALPROATE SODIUM 125 MG: 100 INJECTION, SOLUTION INTRAVENOUS at 08:40

## 2019-04-19 RX ADMIN — Medication 10 ML: at 08:10

## 2019-04-19 RX ADMIN — VALPROATE SODIUM 125 MG: 100 INJECTION, SOLUTION INTRAVENOUS at 03:33

## 2019-04-19 RX ADMIN — VALPROATE SODIUM 125 MG: 100 INJECTION, SOLUTION INTRAVENOUS at 15:12

## 2019-04-19 RX ADMIN — LEVETIRACETAM 500 MG: 100 INJECTION, SOLUTION INTRAVENOUS at 08:10

## 2019-04-19 RX ADMIN — POTASSIUM CHLORIDE, DEXTROSE MONOHYDRATE AND SODIUM CHLORIDE: 150; 5; 450 INJECTION, SOLUTION INTRAVENOUS at 14:28

## 2019-04-19 RX ADMIN — SODIUM CHLORIDE 500 MG: 900 INJECTION, SOLUTION INTRAVENOUS at 22:57

## 2019-04-19 RX ADMIN — AMPICILLIN AND SULBACTAM 1.5 G: 1; .5 INJECTION, POWDER, FOR SOLUTION INTRAMUSCULAR; INTRAVENOUS at 00:49

## 2019-04-19 RX ADMIN — LORAZEPAM 1 MG: 2 INJECTION INTRAMUSCULAR; INTRAVENOUS at 00:47

## 2019-04-19 RX ADMIN — LEVOTHYROXINE SODIUM ANHYDROUS 62.5 MCG: 100 INJECTION, POWDER, LYOPHILIZED, FOR SOLUTION INTRAVENOUS at 08:10

## 2019-04-19 RX ADMIN — DEXTROSE 50 % IN WATER (D50W) INTRAVENOUS SYRINGE 12.5 G: at 10:20

## 2019-04-19 RX ADMIN — LORAZEPAM 1 MG: 2 INJECTION INTRAMUSCULAR; INTRAVENOUS at 18:05

## 2019-04-19 RX ADMIN — AMPICILLIN AND SULBACTAM 1.5 G: 1; .5 INJECTION, POWDER, FOR SOLUTION INTRAMUSCULAR; INTRAVENOUS at 13:57

## 2019-04-19 RX ADMIN — DEXTROSE 50 % IN WATER (D50W) INTRAVENOUS SYRINGE 12.5 G: at 04:58

## 2019-04-19 RX ADMIN — Medication 5 ML: at 08:23

## 2019-04-19 RX ADMIN — LORAZEPAM 1 MG: 2 INJECTION INTRAMUSCULAR; INTRAVENOUS at 23:10

## 2019-04-19 RX ADMIN — ENOXAPARIN SODIUM 40 MG: 40 INJECTION SUBCUTANEOUS at 08:09

## 2019-04-19 RX ADMIN — VALPROATE SODIUM 125 MG: 100 INJECTION, SOLUTION INTRAVENOUS at 22:58

## 2019-04-19 RX ADMIN — DEXTROSE 50 % IN WATER (D50W) INTRAVENOUS SYRINGE 12.5 G: at 08:22

## 2019-04-19 ASSESSMENT — PAIN SCALES - PAIN ASSESSMENT IN ADVANCED DEMENTIA (PAINAD)
BREATHING: 0
BODYLANGUAGE: 0
TOTALSCORE: 0
FACIALEXPRESSION: 0
NEGVOCALIZATION: 0
CONSOLABILITY: 0
BODYLANGUAGE: 0
TOTALSCORE: 0
FACIALEXPRESSION: 0
NEGVOCALIZATION: 0
TOTALSCORE: 0
CONSOLABILITY: 0
CONSOLABILITY: 0
NEGVOCALIZATION: 0
CONSOLABILITY: 0
TOTALSCORE: 0
NEGVOCALIZATION: 0
CONSOLABILITY: 0
FACIALEXPRESSION: 0
TOTALSCORE: 0
BODYLANGUAGE: 0
BREATHING: 0
BREATHING: 0
TOTALSCORE: 0
BREATHING: 0
FACIALEXPRESSION: 0
CONSOLABILITY: 0
FACIALEXPRESSION: 0
BREATHING: 0
CONSOLABILITY: 0
TOTALSCORE: 0
BODYLANGUAGE: 0
NEGVOCALIZATION: 0
BODYLANGUAGE: 0
NEGVOCALIZATION: 0
NEGVOCALIZATION: 0
BODYLANGUAGE: 0
NEGVOCALIZATION: 0
NEGVOCALIZATION: 0
CONSOLABILITY: 0
BODYLANGUAGE: 0
CONSOLABILITY: 0
BREATHING: 0
TOTALSCORE: 0
BODYLANGUAGE: 0
NEGVOCALIZATION: 0
FACIALEXPRESSION: 0
BODYLANGUAGE: 0
BODYLANGUAGE: 0
BREATHING: 0
FACIALEXPRESSION: 0
NEGVOCALIZATION: 0
FACIALEXPRESSION: 0
FACIALEXPRESSION: 0
TOTALSCORE: 0
FACIALEXPRESSION: 0
TOTALSCORE: 0
CONSOLABILITY: 0
BREATHING: 0
FACIALEXPRESSION: 0
BREATHING: 0
BODYLANGUAGE: 0
CONSOLABILITY: 0
TOTALSCORE: 0

## 2019-04-19 ASSESSMENT — PAIN SCALES - GENERAL
PAINLEVEL_OUTOF10: 0

## 2019-04-19 ASSESSMENT — PAIN SCALES - WONG BAKER: WONGBAKER_NUMERICALRESPONSE: 0

## 2019-04-19 NOTE — PROGRESS NOTES
Palliative Care Chart Review  and Check in Note:     NAME:  Mimi Daniel  Admit Date: 4/13/2019  Hospital Day:  Hospital Day: 7   Current Code status: Full Code    Palliative care is continuing to following Mr. Audi James for symptom management,  and goals of care discussion as needed. Patient's chart reviewed today 4/19/19. Called and spoke with guardiangeli Massey about status of DNR request and hospice. We discussed that if pt goes to a SNF with an NG tube, he will likely be readmitted to the hospital in a short time if he pulled out the tube and wasn't able to eat/drink adequate amounts. We discussed going to a long term care facility with hospice. She will talk with the medical specialist at her agency and call palliative care back. Addendum: Re-submitted dnr request form to the guardianship agency. The following are the currently established goals/code status, and Symptom management. Goals of care: Discussing comfort care with guardianship agency. Code status: Full Code, discussing with guardianship agency    Discharge plan:  Hopefully can d/c to LTCF with hospice today    Mindy Velasco NP  [address]

## 2019-04-19 NOTE — CARE COORDINATION
Case Management Daily Note:    Current Plan of Care: Hospice referral        Discharge Plan:  Back to group home    Tentative Discharge Date: 4/18/19    Current Barriers to Discharge:  Need hospice on board    Resources/Information given:  N/A      Case Management Notes:     Received Hospice order. Guardian has no preference of agency. Referral sent to [facility] - fax:  [phone]. Called referral to yarelis Michaels - [phone]. Convenient for guardian to do consult tomorrow morning. Myrtle Martinez will let CM know what time.
Grace Olivia from [facility] came to unit - she has not heard anything from guardian. Touched base with Palliative Care and they are following up with guardian. Referral sent to [facility] as a back up plan. They can accept with NG if needed as long as it is bridled. Plan is for discharge today.
Guardian has agreed to Hospice. [facility] will complete admission this afternoon. Patient will dc from hospital and readmit to a hospice IP bed. Hospice will get him to the SNF when bed available.
Left message at group home to find out if they can manage a puree diet with honey thick liquids. PT/OT evals pending.
Message left for guardian, Aaliyah Han [phone] regarding discharge planning.
Met with guardian at bedside. She is considering SNF at this point [facility]), especially if a Pollyann Demark is going to be pursued. Waiting to talk with doctor & Palliative Care to further discuss issues.
No call back from group home. I called them again and spoke to [facility] who said they can handle a puree, honey thick diet with no problem. Also indicated he has people to assist him with mobility and ADL's. He can have PT/OT treatment coordinated if we send orders. Gave me contact info for coordinator:  Melissa Hayes -[phone]. I attempted to contact her but had to leave message.
Spoke with [facility], [address] [place] [address] ([phone]). States DNR was denied d/t no terminal diagnosis. All paperwork needs to be resubmitted. She is in agreement with the DNR and plan for patient to return to apartment/group home with hospice. Discharge order on chart. However, patient cannot discharge without a plan for nutrition. Currently NPO.
Protective Services, Home Care Staff, Other (Comment)(appointed guardian)  Potential Assistance Needed: Home Care  Type of Home Care Services: [address]  Patient expects to be discharged to[de-identified] [facility]  Expected Discharge Date: 04/16/19    Service Assessment            Advance Directives (For Healthcare)  Pre-existing DNR Comfort Care/DNR Arrest/DNI Order: Other (Comment)(waiting for code status from agency)  Healthcare Directive: Unknown, patient unable to respond due to medical condition              Durable Medical Equipment    per [facility]   Home care at home? No  - 24 hour assistance at group home    LOC: [address] [place] [address]  Name of Facility:  [address] [place] [address]    Therapy Consults  PT evaluation needed?: No(already ordered)  OT Evalulation Needed?: No(already ordered)  SLP evaluation needed?: No(already ordered)      Pharmacy: per group home  Potential Assistance Purchasing Medications:  No  Does patient want to participate in local refill/meds to beds program?: Not Assessed    Discharge Plan   Patient expects to be discharged to[facility]de-identified[facility]       Barriers to discharge: monitoring SDH    Role of Case Management discussed with patient/family/designee.      Favian Powell, RN, BSN  [facility] [employer]  Case Management Department  Ph:  [phone]

## 2019-04-19 NOTE — PLAN OF CARE
Problem: Nutrition  Goal: Optimal nutrition therapy  Description        Outcome: Not Met This Shift  Nutrition Problem: Severe malnutrition  Intervention: Food and/or Nutrient Delivery: Continue NPO(monitor goals of care; TF vs oral diet for pleasure)  Nutritional Goals: Pt will consume >75% of meals and ONS offered; no further weight loss

## 2019-04-19 NOTE — PLAN OF CARE
PT Will remain free of falls throughout LOS. Bed lowest position. Bed alarm on. Bed lowest position. 3/4  Side rails up. Pt oriented to room. Call light within reach. Non skid footwear applied. Fall flag and fall risk sign placed. Pt at risk for impaired skin integrity. Turns self. Sacral Heart in place. Will continue to monitor and assess   Pt notified of when to call RN. Will continue to monitor and assess.

## 2019-04-19 NOTE — DISCHARGE SUMMARY
Hospital Medicine Discharge Summary    Patient: Melchor Boogie     Gender: male  : 1970   Age: 46 y.o. MRN: [id number]    Code Status: Full Code     Primary Care Provider: No primary care provider on file. Admit Date: 2019   Discharge Date:   2019     Admitting Physician: Nahomy Sweeney MD  Discharge Physician: Don Severino MD     Discharge Diagnoses: Active Hospital Problems    Diagnosis Date Noted    Severe malnutrition ([facility] [address] [E43] 2019    Subdural hematoma ([facility] [address] [S06.5X9A] 2019       Hospital Course:   Ilia Torres a 46 y. o. male w/ PMH severe MRDD, seizures, schizophrenia, hypothyroidism, BPH who presents with increased lethargy, decreased appetite and new bruises that found was admitted for symptomatic worsening of subacute left hemispheric subdural hematoma (CT Head  acute L sided 1 cm SDH with mild midline shift). Etiology was unclear as history is limited from patient's severe MRDD, but was likely trauma secondary to another fall (recent SDH 2/2 fall on 4/3).  Please see H & P for further details.      Neuroexam was limited but grossly non-focal, with patient moving all 4 extremities. Acute surgical decompression was not recommended and repeat CT Head  showed no change. Anticoagulation was held, patient was kept normotensive. Patient appeared to have chronically low platelets, and was given a platelet transfusion. His SAUL present on admission was likely prerenal secondary to poor PO intake and diarrhea, treated with IVF with improvement in Cr from1.4>1.1. Patient had hypoglycemia of unknown etiology that was monitored carefully with cautious use of IV dextrose in the setting of brain bleed along with severe malnutrition. The patient was also profoundly hypothermic during admission and required the use of a bear-hugger on an almost daily basis.      Patient required a 24/7 sitter for noncompliance and high fall risk.  He will need assessment and safety measures placed when returning to care facility. I am recommending that the patient be discharged with Hospice. He is discharged today (4/19/2019) in stable condition. He is not a candidate for ngt/peg tube  Over all his is slowely and steadily declining and this is a normal course given his advanced mental retardation   Disposition:  Skilled Facility    Exam:     /83   Pulse 74   Temp 96.6 °F (35.9 °C) (Axillary)   Resp 18   Ht 5' 8\" (1.727 m)   Wt 99 lb 10.4 oz (45.2 kg)   SpO2 96%   BMI 15.15 kg/m²     General appearance: Non-verbal at baseline. Patient is cachectic. HEENT: Pupils equal, round, and reactive to light. Conjunctivae/corneas clear. Respiratory: Normal respiratory effort. Clear to auscultation, bilaterally without Rales/Wheezes/Rhonchi. Cardiovascular: Regular rate and rhythm with normal S1/S2 without murmurs, rubs or gallops. Abdomen: Soft, non-tender, non-distended with normal bowel sounds. Musculoskeletal: No clubbing, cyanosis or edema bilaterally.  Full range of motion without deformity. Skin: Abrasions present  Neurologic:  Limited exam due to mental status. Able to spontaeously move UE and LE. Peripheral Pulses: +2 palpable, equal bilaterally     Consults:     IP CONSULT TO CRITICAL CARE  IP CONSULT TO NEUROSURGERY  IP CONSULT TO SOCIAL WORK  IP CONSULT TO PALLIATIVE CARE  IP CONSULT TO SOCIAL WORK  IP CONSULT TO HOSPICE  IP CONSULT TO HOSPICE    Labs:  For convenience and continuity at follow-up the following most recent labs are provided:    Lab Results   Component Value Date    WBC 2.9 04/19/2019    HGB 12.1 04/19/2019    HCT 36.1 04/19/2019    MCV 91.4 04/19/2019    PLT 86 04/19/2019     04/19/2019    K 4.2 04/19/2019    K 4.6 04/12/2019     04/19/2019    CO2 28 04/19/2019    BUN 21 04/19/2019    CREATININE 0.6 04/19/2019    CALCIUM 8.9 04/19/2019    PHOS 3.4 04/15/2019    ALKPHOS 132 04/12/2019    ALT 67 04/12/2019    AST 58 04/12/2019    BILITOT <0.2 04/12/2019    LABALBU 3.0 04/15/2019     Lab Results   Component Value Date    INR 1.05 04/12/2019       Radiology:  XR CHEST PORTABLE   Final Result   Impression:      Development of right lower lobe consolidation/pneumonia      FL MODIFIED BARIUM SWALLOW W VIDEO   Final Result      Penetration and aspiration with nectar. Refer to speech pathologist note for dietary recommendations. CT HEAD WO CONTRAST   Final Result      No significant interval change. Stable left cerebral convexity subdural hematoma measuring up to 1.5 cm in thickness with mass effect and 6 mm midline shift to right. Slight extension into left parafalcine region and along left tentorial leaflet. Stable small right anterior temporal convexity subdural hematoma measuring 3 mm in thickness. CT head without contrast   Final Result     No change              EKG     Rhythm: sinus bradycardia  Rate: bradycardia  Clinical Impression: no acute changes    Discharge Medications:   Current Discharge Medication List      START taking these medications    Details   amoxicillin-clavulanate (AUGMENTIN) 875-125 MG per tablet Take 1 tablet by mouth 2 times daily for 7 days  Qty: 14 tablet, Refills: 0           Current Discharge Medication List      CONTINUE these medications which have CHANGED    Details   levothyroxine (SYNTHROID) 125 MCG tablet Take 1 tablet by mouth Daily  Qty: 30 tablet, Refills: 0           Current Discharge Medication List      CONTINUE these medications which have NOT CHANGED    Details   ARIPiprazole (ABILIFY) 20 MG tablet Take 20 mg by mouth nightly      calcium carbonate 1250 MG/5ML SUSP suspension Take 1,250 mg by mouth 3 times daily      carBAMazepine (TEGRETOL) 200 MG tablet Take 200 mg by mouth 2 times daily      !! clonazePAM (KLONOPIN) 1 MG tablet Take 0.5 mg by mouth 2 times daily. 0800/1600      !! clonazePAM (KLONOPIN) 1 MG tablet Take 1 mg by mouth nightly.       !! divalproex (DEPAKOTE SPRINKLES) 125 MG

## 2019-04-19 NOTE — PLAN OF CARE
Problem: Falls - Risk of:  Goal: Will remain free from falls  Description  Patient has been free from falls and injuries this shift. Lauren fall risk assessed each shift. Bed locked in lowest position, alarm engaged. Fall bracelet in place, fall sign present outside door, fall socks present on patient. Room free of clutter. Will continue to round and assess needs. Pt receiving one on one pt care due to decreased neurologic state and continuing to make attempts at getting out of bed.      4/19/2019 0135 by Raul Antonio RN  Note:   Bed locked in lowest position, alarm on, and 3/4 rails up. Nonskid socks on. Call light and bedside table within reach. No falls this shift. Will continue to monitor with hourly rounding. 4/18/2019 1923 by Barbara Merino RN  Outcome: Met This Shift  Goal: Absence of physical injury  Description  Absence of physical injury  4/18/2019 1923 by Barbara Merino RN  Outcome: Met This Shift     Problem: Risk for Impaired Skin Integrity  Goal: Tissue integrity - skin and mucous membranes  Description  Patient's skin assessed q4h hours and prn. Patient turns self frequently. Skin is kept clean and dry. Preventative dressings have been applied. Sacral heart placed and assessed to be CDI. Charlie scale is assessed and documented each shift. Patient's skin integrity maintained throughout shift. 4/19/2019 0135 by Raul Antonio RN  Note:   Pt is absent of new skin breakdown. Pt has areas of skin breakdown and redness (see flowsheet). Sacral heart on buttocks and hips. Pt is getting repositioned with pillow support q2hrs and heels are elevated off the bed with pillows. Monitoring for incontinences and soiled lines. Changing linens when needed so they remain clean and dry. Educated pt on importance of repositioning. Will continue to monitor hourly.    4/18/2019 1923 by Barbara Merino RN  Outcome: Met This Shift     Problem: Pain:  Goal: Pain level will decrease  Description  Patient's pain assessed every shift, post procedure, prn, or any changes in status. Pt's pain is assessed using Advanced Dementia pain scale. . Pt's pain has been controlled this shift.        4/18/2019 1923 by Angelina Garcia RN  Outcome: Met This Shift  Goal: Control of acute pain  Description  Control of acute pain  4/18/2019 1923 by Angelina Garcia RN  Outcome: Met This Shift  Goal: Control of chronic pain  Description  Control of chronic pain  4/18/2019 1923 by Angelina Garica RN  Outcome: Met This Shift     Problem: HEMODYNAMIC STATUS  Goal: Patient has stable vital signs and fluid balance  4/18/2019 1923 by Angelina Garcia RN  Outcome: Met This Shift

## 2019-04-19 NOTE — PROGRESS NOTES
Internal Medicine PGY-1 Resident Progress Note        PCP: No primary care provider on file. Date of Admission: 4/13/2019    Chief Complaint: Altered mental status; lethargy     Hospital Course (per HPI): Donna Chao a 46 y. o. male w/ PMH severe MRDD, seizures, schizophrenia, hypothyroidism, BPH who presents with increased lethargy, decreased appetite and new bruises that found was admitted for symptomatic worsening of subacute left hemispheric subdural hematoma (CT Head 4/12 acute L sided 1 cm SDH with mild midline shift). Etiology was unclear as history is limited from patient's severe MRDD, but was likely trauma secondary to another fall (recent SDH 2/2 fall on 4/3). Subjective: Patient was seen and examined this AM. Continues to be hypothermic (temperature 94 this AM). Still awaiting decision from caregiver regarding code status and possible Hospice. Plan to continue current management at this time.      Medications:  Reviewed    Infusion Medications    dextrose       Scheduled Medications    levetiracetam  500 mg Intravenous Q12H    ampicillin-sulbactam  1.5 g Intravenous Q6H    levothyroxine  62.5 mcg Intravenous Daily    And    sodium chloride (PF)  5 mL Intravenous Daily    valproate sodium (DEPACON) IVPB  125 mg Intravenous Q6H    LORazepam  1 mg Intravenous BID    enoxaparin  40 mg Subcutaneous Daily    ARIPiprazole  20 mg Oral Nightly    carBAMazepine  200 mg Oral BID    mirtazapine  30 mg Oral Nightly    tamsulosin  0.4 mg Oral Daily    sodium chloride flush  10 mL Intravenous 2 times per day     PRN Meds: LORazepam, sodium chloride flush, magnesium hydroxide, ondansetron, labetalol, glucose, glucagon (rDNA), dextrose, dextrose      Intake/Output Summary (Last 24 hours) at 4/19/2019 0949  Last data filed at 4/19/2019 0823  Gross per 24 hour   Intake 10 ml   Output --   Net 10 ml       Physical Exam Performed:    BP 98/67   Pulse 65   Temp 97 °F (36.1 °C) (Axillary)   Resp 18 Ht 5' 8\" (1.727 m)   Wt 99 lb 10.4 oz (45.2 kg)   SpO2 96%   BMI 15.15 kg/m²     General appearance: Non-verbal at baseline  HEENT: Pupils equal, round, and reactive to light. Conjunctivae/corneas clear. Respiratory: Clear to auscultation bilaterally   Cardiovascular: Regular rate and rhythm with normal S1/S2 without murmurs, rubs or gallops. Abdomen: Soft, non-tender, non-distended with normal bowel sounds. Musculoskeletal: No clubbing, cyanosis or edema bilaterally. Full range of motion without deformity. Skin: Abrasions present  Neurologic:  Limited exam due to mental status. Able to spontaeously move UE and LE. Peripheral Pulses: +2 palpable, equal bilaterally     Labs:   Recent Labs     04/17/19 0402 04/18/19 0447 04/19/19  0357   WBC 3.0* 2.1* 2.9*   HGB 10.1* 9.8* 12.1*   HCT 29.9* 29.2* 36.1*   PLT 69* 73* 86*     Recent Labs     04/17/19 0402 04/18/19 0447 04/19/19  0358    140 138   K 3.7 3.5 4.2    102 101   CO2 29 30 28   BUN 23* 19 21*   CREATININE 0.7* 0.6* 0.6*   CALCIUM 8.3 8.4 8.9     No results for input(s): AST, ALT, BILIDIR, BILITOT, ALKPHOS in the last 72 hours. No results for input(s): INR in the last 72 hours. No results for input(s): Josemanuel Milch in the last 72 hours. Urinalysis:      Lab Results   Component Value Date    NITRU Negative 04/12/2019    WBCUA 6 04/12/2019    RBCUA 18 04/12/2019    BLOODU SMALL 04/12/2019    SPECGRAV 1.025 04/12/2019    GLUCOSEU Negative 04/12/2019       Radiology:  XR CHEST PORTABLE   Final Result   Impression:      Development of right lower lobe consolidation/pneumonia      FL MODIFIED BARIUM SWALLOW W VIDEO   Final Result      Penetration and aspiration with nectar. Refer to speech pathologist note for dietary recommendations. CT HEAD WO CONTRAST   Final Result      No significant interval change.       Stable left cerebral convexity subdural hematoma measuring up to 1.5 cm in thickness with mass effect and 6 mm midline shift to right. Slight extension into left parafalcine region and along left tentorial leaflet. Stable small right anterior temporal convexity subdural hematoma measuring 3 mm in thickness. CT head without contrast   Final Result     No change              Assessment/Plan:  Patient is a 78-year-old male with an extensive past medical history who presented to [facility] [employer] [facility] emergency department on 4/13/2019 after being found to be increasingly lethargic by his caregiver.  Admitted for:     Subdural hematoma  - Repeat Head CT Stable  - NSGY following; patient stable for discharge    - SBP < 160, labetalol PRN q6H  - keep HOB > 30  - Neuro check q4h  - PT/OT  - SLP following; recommend that the patient remain NPO at this time     Aspiration Pneumonia -CXR (4/17) showing development of right lower lobe consolidation.   -Continue ampicillin-sulbactam (day 2)  -Daily CBC    Hypoglycemia, improving   -Insulin slightly below normal at 1.0   -C-peptide negative (1.0)   -Sulfonylurea screen negative   -TSH elevated at 6.51  -Free T4 within normal limits   -Beta hydroxybutyrate within normal limits at 0.08   -Continue Accuchecks      SAUL, resolved  -Most recent creatinine 0.6     MRDD  -Baseline non-verbal      Seizures  -Continue seizure prophylaxis with IV valproate, Keppra and carbamazepine     Hypothyroidism  -Continue IV levothyroxine      BPH  - Continue home Flomax      DVT Prophylaxis: Lovenox   Diet: Diet NPO Effective Now  Code Status: Full Code    PT/OT Eval Status: Consulted     Dispo - General medicine/surgery floors     I will discuss the patient with the senior resident and MD Piper Salguero MD  Internal Medicine Resident PGY-1  Julio

## 2019-04-19 NOTE — PROGRESS NOTES
assess strategies due to pt cognitive impairment and inability to follow commands. Recommend puree with honey thick liquids via straw. Treatment Dx and ICD 10: oropharyngeal dysphagia    Pain: none indicated through any means    Current Diet : NPO-      Treatment:  1- The patient will tolerate repeat BSE when able.   4/15:  Pt alert, non verbal at baseline. Pt opens mouth when he sees the spoon or cup.  Pt presented with trials of ice chips, water via straw, nectar thick water via tsp/straw and applesauce.  Pt with audible swallow, swallow appears to trigger very quickly and intermittent cough across all consistencies.  Extra dry swallows were noted after PO presentations, which may be indicative of pharyngeal residue.    Recommend MBS to fully analyze swallow function. Rn in agreement  Goal met     2-Pt will participate in [facility]  4/15: goal met    3-  Pt will consume PO safely without signs or symptoms of aspiration  4/16 -4/18: pt was not appropriate for PO due to lethargy  4/19: pt more alert, okay to try PO per RN. Pt positioned upright with pillows, keeps head down. Pt presented with 2 trials of 1/2 tsp pudding and 1/2 tsp of honey thick juice. No manipulation/propulsion of bolus noted. Bolus appeared to liquify with anterior loss noted, though pt swallowed some as swallow was felt upon palpation of anterior neck. Used yankauer to suction remaining bolus from oral cavity. D/w RN, very small amounts puree/honey by 1/2 tsp for pleasure. Cont goal    Patient/Family/Caregiver Education:  Pt brother came after session, asking about giving pt ice chips. RN present and educated to results of MBS and diet recommendations and rationale. RN stated okay for small amounts of ice chips for pt pleasure. Educated to importance of oral care.   Brother stated understanding    Compensatory Strategies: for pleasure feeds  Small bites/sips  Eat/Feed slowly  Assist feed  Upright as possible for all oral intake  Use straw      Plan:  Continued daily Dysphagia treatment with goals per  plan of care. Diet recommendations:  Puree/honey by 1/2 tsp for pleasure or small amounts of ice chips. Aggressive oral care 2-3 x /day  DC recommendation: pt most likely to be dc with hospice per chart / staff reports  Treatment: 15  D/W nursing [place]  Needs met prior to leaving room, call button in reach. Alejandro Mendez M.S./CCC-SLP #[id number]  Pg.  # [phone]    If patient is discharged prior to next treatment, this note will serve as the discharge summary

## 2019-04-19 NOTE — PROGRESS NOTES
Nutrition Assessment     Type and Reason for Visit: Reassess    Nutrition Recommendations:     Meets ASPEN criteria for severe malnutrition. Aggressive nutrition intervention should be considered, up to and including alternative means of nutrition/hydration, if nutrition status can not improve. Despite reports of good PO intake PTA, pt continues to lose weight. Currently NPO w/ concern for aspiration. Long term nutrition via PEG vs quality of life should be considered. TF recommendations provided below PRN     · Monitor goals of care. · If comfort care is desired, recommend diet per SLP recommendations: pureed/ honey thick liquids by 1/2 tsp only for pleasure feeds. Add Magic Cup ONS   · If aggressive care is desired, recommend discussion of PEG for long term nutrition. TF recommendations provided below    Tube Feeding Recommendations:  · Please note pt meets ASPEN criteria for severe malnutrition. This puts patient at risk for refeeding syndrome. Monitor closely and correct lytes (K, Phos, Mg) before starting and progressing TF to goal d/t risk of refeeding syndrome (hx extended inadequate nutritional intake). Also consider initiating Thiamin + Folic Acid + MVI along with start of alternative nutrition  · Recommend standard w/ fiber formula/ \"Jevity 1.2\" initiate at 20 mL/hr and as tolerated, increase by 10 mL/hr q 6-8 hours until goal of 60 mL/hr   · 100 mL Q 4 hrs H2O flushes   · Ensure head of bed is 30-45 degrees during feeding  · Monitor for tolerance (bowel habits, N/V, abdominal distention)    Nutrition Assessment: Pt continues to be at high nutritional risk r/t NPO status r/t concern for aspiration and dx of severe malnutrition. Noted SLP recommendations for pleasure feeds of pureed/ honey thick liquids by 1/2 tsp only. Monitor plan of care, if aggressive care is desired recommend PEG for long term nutrition w/ TF.  Will provide TF recommendations PRN     Malnutrition Assessment:  · Malnutrition Status: Meets the criteria for severe malnutrition  · Context: Chronic illness  · Findings of the 6 clinical characteristics of malnutrition (Minimum of 2 out of 6 clinical characteristics is required to make the diagnosis of moderate or severe Protein Calorie Malnutrition based on AND/ASPEN Guidelines):  1. Energy Intake-Unable to assess,      2. Weight Loss-7.5% loss or greater, (in 5 months)  3. Fat Loss-Severe subcutaneous fat loss, Orbital, Triceps, Fat overlying the ribs  4. Muscle Loss-Severe muscle mass loss, Temples (temporalis muscle), Clavicles (pectoralis and deltoids), Thigh (quadriceps), Calf (gastrocnemius), Scapula (trapezius)  5. Fluid Accumulation-No significant fluid accumulation,    6.  Strength-Not measured    Nutrition Risk Level: High    Nutrition Needs:  · Estimated Daily Total Kcal: 9508-2056 kcal(33-35 kcal/kg +250 kcal for wt gain)  · Estimated Daily Protein (g): 65-77 grams (1.5-1.8)  · Estimated Daily Fluid (ml/day): 1750 mL    Nutrition Diagnosis:   · Problem: Severe malnutrition  · Etiology: related to Insufficient energy/nutrient consumption     Signs and symptoms:  as evidenced by Severe loss of subcutaneous fat, BMI, Severe muscle loss    Objective Information:  · Nutrition-Focused Physical Findings: cachectic; LBM 4/17; no edema   · Wound Type: None  · Current Nutrition Therapies:  · Oral Diet Orders: NPO   · Oral Diet intake: NPO  · Oral Nutrition Supplement (ONS) Orders: None  · ONS intake: NPO  · Tube Feeding Recommendations PRN: RD Goal: Jevity 1.2 (Standard with Fiber) at 60 ml/hr to provide 1440 ml TV, 1728 kcal, 80 grams protein, 1162 ml free water.  Water flush 100 ml every 4 hours  · Anthropometric Measures:  · Ht: 5' 8\" (172.7 cm)   · Current Body Wt: 99 lb 10.4 oz (45.2 kg)  · Admission Body Wt: 94 lb (42.6 kg)  · Usual Body Wt: (ALEXI)  · Weight Change: 8.7% loss in 5 months per EMR review   · Ideal Body Wt: 154 lb (69.9 kg)   · BMI Classification: BMI <18.5 Underweight    Nutrition Interventions:   Continue NPO(monitor goals of care; TF vs oral diet for pleasure)  Continued Inpatient Monitoring    Nutrition Evaluation:   · Evaluation: No progress toward goals   · Goals: Pt will consume >75% of meals and ONS offered; no further weight loss   · Monitoring: Nutrition Progression, Monitor Bowel Function, Mental Status/Confusion(goals of care)      Electronically signed by Candido Lo RD, LD on 4/19/19 at 1:30 PM    Contact Number:   Pager: [phone]  Office: [phone]

## 2019-04-19 NOTE — CARE COORDINATION
Case Management Daily Note:    Current Plan of Care:   [facility]      Discharge Plan: To SNF under Hospice Care    Tentative Discharge Date: 4/22/19    Current Barriers to Discharge:   Placement issues    Resources/Information given:  N/A      Case Management Notes:    [facility] has accepted patient for admission on Monday when they have a bed available. [facility]

## 2019-04-19 NOTE — PROGRESS NOTES
Palliative Care Chart Review  and Check in Note:     NAME:  Fabian Daniel  Admit Date: 4/19/2019  Hospital Day:  Hospital Day: 1   Current Code status: DNR-CC    Palliative care is continuing to following Mr. Jerrica Lopez for symptom management,  and goals of care discussion as needed. Patient's chart reviewed today 4/19/19. Received a letter from guardianship agency approving DNR-CC. Also spoke with Chaya Garland from the guardianship agency and verbally received confirmation of [facility] approval.    The following are the currently established goals/code status, and Symptom management.      Goals of care: Comfort care    Code status: changed to [facility]    Discharge plan: GIP with [facility] until he can d/c to [facility] on Monday Ferman December, NP  [address]

## 2019-04-20 PROCEDURE — 2580000003 HC RX 258: Performed by: STUDENT IN AN ORGANIZED HEALTH CARE EDUCATION/TRAINING PROGRAM

## 2019-04-20 PROCEDURE — 1250000000 HC SEMI PRIVATE HOSPICE R&B

## 2019-04-20 PROCEDURE — 6360000002 HC RX W HCPCS: Performed by: STUDENT IN AN ORGANIZED HEALTH CARE EDUCATION/TRAINING PROGRAM

## 2019-04-20 PROCEDURE — 94760 N-INVAS EAR/PLS OXIMETRY 1: CPT

## 2019-04-20 PROCEDURE — 2500000003 HC RX 250 WO HCPCS: Performed by: STUDENT IN AN ORGANIZED HEALTH CARE EDUCATION/TRAINING PROGRAM

## 2019-04-20 RX ADMIN — SODIUM CHLORIDE 500 MG: 900 INJECTION, SOLUTION INTRAVENOUS at 08:27

## 2019-04-20 RX ADMIN — VALPROATE SODIUM 125 MG: 100 INJECTION, SOLUTION INTRAVENOUS at 03:43

## 2019-04-20 RX ADMIN — VALPROATE SODIUM 125 MG: 100 INJECTION, SOLUTION INTRAVENOUS at 10:12

## 2019-04-20 RX ADMIN — LORAZEPAM 1 MG: 2 INJECTION INTRAMUSCULAR; INTRAVENOUS at 11:01

## 2019-04-20 RX ADMIN — LORAZEPAM 1 MG: 2 INJECTION INTRAMUSCULAR; INTRAVENOUS at 06:39

## 2019-04-20 RX ADMIN — VALPROATE SODIUM 125 MG: 100 INJECTION, SOLUTION INTRAVENOUS at 20:47

## 2019-04-20 RX ADMIN — LORAZEPAM 1 MG: 2 INJECTION INTRAMUSCULAR; INTRAVENOUS at 23:00

## 2019-04-20 RX ADMIN — SODIUM CHLORIDE 500 MG: 900 INJECTION, SOLUTION INTRAVENOUS at 20:15

## 2019-04-20 RX ADMIN — LORAZEPAM 1 MG: 2 INJECTION INTRAMUSCULAR; INTRAVENOUS at 16:56

## 2019-04-20 RX ADMIN — VALPROATE SODIUM 125 MG: 100 INJECTION, SOLUTION INTRAVENOUS at 14:48

## 2019-04-20 ASSESSMENT — PAIN SCALES - PAIN ASSESSMENT IN ADVANCED DEMENTIA (PAINAD)
BODYLANGUAGE: 0
BODYLANGUAGE: 1
FACIALEXPRESSION: 0
FACIALEXPRESSION: 0
TOTALSCORE: 0
NEGVOCALIZATION: 0
CONSOLABILITY: 0
BREATHING: 0
BREATHING: 0
TOTALSCORE: 0
BREATHING: 0
NEGVOCALIZATION: 1
BREATHING: 0
TOTALSCORE: 0
NEGVOCALIZATION: 0
BODYLANGUAGE: 0
BREATHING: 0
CONSOLABILITY: 0
TOTALSCORE: 0
FACIALEXPRESSION: 0
CONSOLABILITY: 0
FACIALEXPRESSION: 1
CONSOLABILITY: 1
FACIALEXPRESSION: 0
NEGVOCALIZATION: 0
NEGVOCALIZATION: 0
BREATHING: 0
BODYLANGUAGE: 0
CONSOLABILITY: 0
CONSOLABILITY: 0
FACIALEXPRESSION: 0
NEGVOCALIZATION: 0
BODYLANGUAGE: 0
FACIALEXPRESSION: 0
TOTALSCORE: 4
NEGVOCALIZATION: 0
TOTALSCORE: 0
NEGVOCALIZATION: 0
CONSOLABILITY: 0
BREATHING: 0
CONSOLABILITY: 0
BREATHING: 0
BODYLANGUAGE: 0
TOTALSCORE: 0
FACIALEXPRESSION: 0
TOTALSCORE: 0
BODYLANGUAGE: 0
BODYLANGUAGE: 0

## 2019-04-20 ASSESSMENT — PAIN SCALES - GENERAL
PAINLEVEL_OUTOF10: 4
PAINLEVEL_OUTOF10: 0
PAINLEVEL_OUTOF10: 0

## 2019-04-20 NOTE — PROGRESS NOTES
Plan : 1. ARF, resolved with ivf  FTT, severe protein ant malnutrition, supportive care  Hospice     There is no height or weight on file to calculate BMI. The following items were considered in medical decision making:  Independent review of images, Review / order clinical lab tests, Review / order radiology, tests Decision to obtain old records. DVT Prophylaxis: lvx   Diet: DIET GENERAL; Dysphagia I Pureed; Honey Thick  Code Status: DNR-CC    PT/OT Eval Status: c. At his/her baseline . This chart was generated in part by using Dragon Dictation system and may contain errors related to that system including errors in grammar, punctuation, and spelling, as well as words and phrases that may be inappropriate. When dictating, effort is made to correct spelling/grammar errors. If there are any questions or concerns please feel free to contact the dictating provider for clarification. I have discussed the above stated plan with tpts sister  and they verbalized understanding and agreed with the plan. A total time of 15 min were exclusively devoted to discuss plan of care and advanced care planning during this encounter. RN notified about todays plan  bed side. Elmer Wyatt MD  [phone]

## 2019-04-20 NOTE — PLAN OF CARE
Problem: Risk for Impaired Skin Integrity  Goal: Tissue integrity - skin and mucous membranes  Description  Structural intactness and normal physiological function of skin and  mucous membranes. Outcome: Met This Shift  Note:   Skin assessment completed every shift. Pt assessed for incontinence, appropriate barrier cream applied prn. Pt encouraged to turn/rotate every 2 hours. Assistance provided if pt unable to do so themselves. Problem: Falls - Risk of:  Goal: Will remain free from falls  Description  Will remain free from falls  Outcome: Met This Shift  Note:   Fall risk precautions in place. Bed in lowest position with wheels locked, Ritot bed alarm in place and activated, orange blanket on bed, non-skid socks on pt, fall risk ID on pt, call light in reach, pt encouraged to call before getting out of bed and for any other needs or complaints.

## 2019-04-20 NOTE — PROGRESS NOTES
Patient on Dietary IP consult list. Please see RD progress note and recommendations from 4/19/19. Will follow per nutrition standards of care.      Thank you,    Nabor Infante RD, LD

## 2019-04-21 PROCEDURE — 2580000003 HC RX 258: Performed by: STUDENT IN AN ORGANIZED HEALTH CARE EDUCATION/TRAINING PROGRAM

## 2019-04-21 PROCEDURE — 2500000003 HC RX 250 WO HCPCS: Performed by: STUDENT IN AN ORGANIZED HEALTH CARE EDUCATION/TRAINING PROGRAM

## 2019-04-21 PROCEDURE — 6360000002 HC RX W HCPCS: Performed by: STUDENT IN AN ORGANIZED HEALTH CARE EDUCATION/TRAINING PROGRAM

## 2019-04-21 PROCEDURE — 1250000000 HC SEMI PRIVATE HOSPICE R&B

## 2019-04-21 RX ORDER — DEXTROSE AND SODIUM CHLORIDE 5; .45 G/100ML; G/100ML
INJECTION, SOLUTION INTRAVENOUS
Status: DISPENSED
Start: 2019-04-21 | End: 2019-04-21

## 2019-04-21 RX ADMIN — VALPROATE SODIUM 125 MG: 100 INJECTION, SOLUTION INTRAVENOUS at 03:00

## 2019-04-21 RX ADMIN — SODIUM CHLORIDE 500 MG: 900 INJECTION, SOLUTION INTRAVENOUS at 19:50

## 2019-04-21 RX ADMIN — VALPROATE SODIUM 125 MG: 100 INJECTION, SOLUTION INTRAVENOUS at 20:40

## 2019-04-21 RX ADMIN — SODIUM CHLORIDE 500 MG: 900 INJECTION, SOLUTION INTRAVENOUS at 08:03

## 2019-04-21 RX ADMIN — LORAZEPAM 1 MG: 2 INJECTION INTRAMUSCULAR; INTRAVENOUS at 22:14

## 2019-04-21 RX ADMIN — VALPROATE SODIUM 125 MG: 100 INJECTION, SOLUTION INTRAVENOUS at 09:05

## 2019-04-21 RX ADMIN — VALPROATE SODIUM 125 MG: 100 INJECTION, SOLUTION INTRAVENOUS at 15:27

## 2019-04-21 RX ADMIN — LORAZEPAM 1 MG: 2 INJECTION INTRAMUSCULAR; INTRAVENOUS at 05:00

## 2019-04-21 RX ADMIN — LORAZEPAM 1 MG: 2 INJECTION INTRAMUSCULAR; INTRAVENOUS at 16:45

## 2019-04-21 RX ADMIN — LORAZEPAM 1 MG: 2 INJECTION INTRAMUSCULAR; INTRAVENOUS at 10:50

## 2019-04-21 ASSESSMENT — PAIN SCALES - PAIN ASSESSMENT IN ADVANCED DEMENTIA (PAINAD)
BREATHING: 0
BODYLANGUAGE: 0
NEGVOCALIZATION: 0
TOTALSCORE: 0
NEGVOCALIZATION: 0
BODYLANGUAGE: 0
FACIALEXPRESSION: 0
NEGVOCALIZATION: 0
BREATHING: 0
FACIALEXPRESSION: 0
TOTALSCORE: 0
CONSOLABILITY: 0
CONSOLABILITY: 0
BODYLANGUAGE: 0
NEGVOCALIZATION: 0
BODYLANGUAGE: 0
CONSOLABILITY: 0
TOTALSCORE: 0
FACIALEXPRESSION: 0
CONSOLABILITY: 0
FACIALEXPRESSION: 0
CONSOLABILITY: 0
FACIALEXPRESSION: 0
TOTALSCORE: 0
BREATHING: 0
TOTALSCORE: 0
BREATHING: 0
TOTALSCORE: 0
CONSOLABILITY: 0
TOTALSCORE: 0
CONSOLABILITY: 0
FACIALEXPRESSION: 0
BODYLANGUAGE: 0
BREATHING: 0
CONSOLABILITY: 0
NEGVOCALIZATION: 0
NEGVOCALIZATION: 0
FACIALEXPRESSION: 0
BODYLANGUAGE: 0
BODYLANGUAGE: 0
FACIALEXPRESSION: 0
TOTALSCORE: 0
BREATHING: 0
BODYLANGUAGE: 0
NEGVOCALIZATION: 0
NEGVOCALIZATION: 0

## 2019-04-21 ASSESSMENT — PAIN SCALES - GENERAL
PAINLEVEL_OUTOF10: 0

## 2019-04-21 NOTE — PROGRESS NOTES
Pt ate breakfast of puree and honey thick liquids with good appetite. Audible swallows, strong cough. Will continue to monitor and provide comfort.

## 2019-04-21 NOTE — PLAN OF CARE
Problem: Risk for Impaired Skin Integrity  Goal: Tissue integrity - skin and mucous membranes  Description  Structural intactness and normal physiological function of skin and  mucous membranes. Outcome: Ongoing  Note:   Pt skin is pale, cool and dry with poor turgor. Scattered abrasions and scabs are present. Abrasions with stitches above L eye. Scratches are present in L side of abdomen. Abrasions on bilateral shins, healing and open to air. Buttocks and bilateral elbows are reddened. Pt is incontinent; valentine care provided at time of soiling. Pt turns self and frequently requires repositioning due to attempts to exit bed. Will continue to monitor. Problem: Falls - Risk of:  Goal: Will remain free from falls  Description  Will remain free from falls  Outcome: Ongoing  Note:   Pt is a fall risk. Fall risk protocol in place. See Azucena Thurston Fall Score. Pt bed is in low position, bed alarm is on, side rails up, fall risk bracelet applied. , non-skid footwear in use. Patient/family educated on fall risk protocol. Patient makes frequent attempts to exit bed, throwing legs over bedrails. Bed alarm is on, Avasys monitor in use. Will continue with hourly rounds for po intake, pain needs, toileting and repositioning as needed. Will continue to monitor for needs. Goal: Absence of physical injury  Description  Absence of physical injury  Outcome: Ongoing     Problem: Pain:  Description  Pain management should include both nonpharmacologic and pharmacologic interventions. Goal: Pain level will decrease  Description  Pain level will decrease  Outcome: Ongoing  Goal: Control of acute pain  Description  Control of acute pain  Outcome: Ongoing  Goal: Control of chronic pain  Description  Control of chronic pain  Outcome: Ongoing  Note:   Advanced Dementia assessment scale in use. Pt appears comfortable. Morphine order in place, not needed this shift. Will continue to monitor for pain.

## 2019-04-21 NOTE — PROGRESS NOTES
Hospitalist Progress Note      PCP: No primary care provider on file. Date of Admission: 4/19/2019  Hospital day - 2        Medications:  Reviewed    Infusion Medications    dextrose 5 % and 0.45 % NaCl       Scheduled Medications    LORazepam  1 mg Intravenous Q6H    levetiracetam  500 mg Intravenous Q12H    valproate sodium (DEPACON) IVPB  125 mg Intravenous Q6H     PRN Meds: morphine, ondansetron      Intake/Output Summary (Last 24 hours) at 4/21/2019 1243  Last data filed at 4/21/2019 2302  Gross per 24 hour   Intake 2533.27 ml   Output --   Net 2533.27 ml         Chief Complaint: arf, poor po intake         Subjective: 4.20- sleeping, his sister at bed side    4.21- patient is resting on my exam, ate a bit of food earlier. Sister said he was interactive with family last night but not today. ROS:  Unable to perform as patient non verbal  Exam performed by me:    /75   Pulse 62   Temp 96 °F (35.6 °C) (Temporal)   Resp 18   Wt 101 lb 13.6 oz (46.2 kg)   SpO2 97%   BMI 15.49 kg/m²       General appearance: comfortable, very thin, chronically ill appearing  Respiratory:  clear to  auscultation , accessory muscle use no, Rales/Ronchi no  Cardiovascular: Regular rate and rhythm with normal S1/S2 ,  Abdomen: Soft, non-tender, non-distended with normal bowel sounds. Musculoskelatal: muscle wasting is present,  no edema bilaterally. Neurologic:  No verbal, not following commands      Labs:   Recent Labs     04/19/19  0357   WBC 2.9*   HGB 12.1*   HCT 36.1*   PLT 86*     Recent Labs     04/19/19  0358      K 4.2      CO2 28   BUN 21*   CREATININE 0.6*   CALCIUM 8.9     No results for input(s): AST, ALT, BILIDIR, BILITOT, ALKPHOS in the last 72 hours. No results for input(s): INR in the last 72 hours. No results for input(s): Eward Pong in the last 72 hours. No flowsheet data found. Diagnostic Assesment and Plan :   1. ARF  2.  FTT, severe protein ant malnutrition, supportive care  3. Seizure disorder, schizophrenia, mental retardation, SDH: cont with AED until discharge to hospice  4. Plan of care: Hospice as inpatient with transfer to SNF on Monday once bed is available. Body mass index is 15.49 kg/m². DVT Prophylaxis: not indicated, hospice patient  Diet: DIET GENERAL; Dysphagia I Pureed;  Honey Thick  Code Status: DNR-CC      Annamarie Mayo MD

## 2019-04-22 VITALS
OXYGEN SATURATION: 100 % | RESPIRATION RATE: 18 BRPM | BODY MASS INDEX: 15.49 KG/M2 | WEIGHT: 101.85 LBS | HEART RATE: 65 BPM | SYSTOLIC BLOOD PRESSURE: 137 MMHG | TEMPERATURE: 97 F | DIASTOLIC BLOOD PRESSURE: 78 MMHG

## 2019-04-22 PROBLEM — R41.82 ALTERED MENTAL STATUS: Status: ACTIVE | Noted: 2019-04-22

## 2019-04-22 PROCEDURE — 6360000002 HC RX W HCPCS: Performed by: STUDENT IN AN ORGANIZED HEALTH CARE EDUCATION/TRAINING PROGRAM

## 2019-04-22 PROCEDURE — 2580000003 HC RX 258: Performed by: STUDENT IN AN ORGANIZED HEALTH CARE EDUCATION/TRAINING PROGRAM

## 2019-04-22 PROCEDURE — 2500000003 HC RX 250 WO HCPCS: Performed by: STUDENT IN AN ORGANIZED HEALTH CARE EDUCATION/TRAINING PROGRAM

## 2019-04-22 RX ORDER — LORAZEPAM 2 MG/ML
1 CONCENTRATE ORAL EVERY 6 HOURS PRN
Qty: 1 BOTTLE | Refills: 0 | Status: SHIPPED | OUTPATIENT
Start: 2019-04-22 | End: 2019-04-27

## 2019-04-22 RX ORDER — CLONAZEPAM 1 MG/1
1 TABLET ORAL NIGHTLY
Qty: 15 TABLET | Refills: 0 | Status: SHIPPED | OUTPATIENT
Start: 2019-04-22 | End: 2019-04-27

## 2019-04-22 RX ORDER — MORPHINE SULFATE 100 MG/5ML
5 SOLUTION ORAL EVERY 4 HOURS PRN
Qty: 1 BOTTLE | Refills: 0 | Status: SHIPPED | OUTPATIENT
Start: 2019-04-22 | End: 2019-04-25

## 2019-04-22 RX ORDER — MORPHINE SULFATE 20 MG/5ML
2 SOLUTION ORAL EVERY 4 HOURS PRN
Qty: 1 BOTTLE | Refills: 0 | Status: SHIPPED | OUTPATIENT
Start: 2019-04-22 | End: 2019-04-22 | Stop reason: HOSPADM

## 2019-04-22 RX ORDER — CLONAZEPAM 1 MG/1
0.5 TABLET ORAL 2 TIMES DAILY
Qty: 15 TABLET | Refills: 0 | Status: SHIPPED | OUTPATIENT
Start: 2019-04-22 | End: 2019-04-27

## 2019-04-22 RX ADMIN — VALPROATE SODIUM 125 MG: 100 INJECTION, SOLUTION INTRAVENOUS at 02:54

## 2019-04-22 RX ADMIN — SODIUM CHLORIDE 500 MG: 900 INJECTION, SOLUTION INTRAVENOUS at 08:36

## 2019-04-22 RX ADMIN — LORAZEPAM 1 MG: 2 INJECTION INTRAMUSCULAR; INTRAVENOUS at 04:41

## 2019-04-22 RX ADMIN — LORAZEPAM 1 MG: 2 INJECTION INTRAMUSCULAR; INTRAVENOUS at 12:23

## 2019-04-22 RX ADMIN — VALPROATE SODIUM 125 MG: 100 INJECTION, SOLUTION INTRAVENOUS at 08:57

## 2019-04-22 ASSESSMENT — PAIN SCALES - PAIN ASSESSMENT IN ADVANCED DEMENTIA (PAINAD)
BREATHING: 0
CONSOLABILITY: 0
BREATHING: 0
FACIALEXPRESSION: 0
BREATHING: 0
FACIALEXPRESSION: 0
TOTALSCORE: 0
NEGVOCALIZATION: 0
CONSOLABILITY: 0
FACIALEXPRESSION: 0
FACIALEXPRESSION: 0
TOTALSCORE: 0
TOTALSCORE: 0
CONSOLABILITY: 0
BREATHING: 0
BODYLANGUAGE: 0
TOTALSCORE: 0
NEGVOCALIZATION: 0
FACIALEXPRESSION: 0
TOTALSCORE: 0
FACIALEXPRESSION: 0
NEGVOCALIZATION: 0
NEGVOCALIZATION: 0
TOTALSCORE: 0
NEGVOCALIZATION: 0
TOTALSCORE: 0
CONSOLABILITY: 0
BREATHING: 0
CONSOLABILITY: 0
NEGVOCALIZATION: 0
BODYLANGUAGE: 0
FACIALEXPRESSION: 0
BODYLANGUAGE: 0
CONSOLABILITY: 0
NEGVOCALIZATION: 0
FACIALEXPRESSION: 0
BODYLANGUAGE: 0
CONSOLABILITY: 0
NEGVOCALIZATION: 0
BODYLANGUAGE: 0
BREATHING: 0
BODYLANGUAGE: 0
BREATHING: 0
TOTALSCORE: 0
CONSOLABILITY: 0
BREATHING: 0

## 2019-04-22 ASSESSMENT — PAIN SCALES - GENERAL: PAINLEVEL_OUTOF10: 0

## 2019-04-22 NOTE — PROGRESS NOTES
Pt non verbal at baseline. Pt attempts to get out of bed frequently when awake by throwing legs over side rails. Pt follows some commands like lift legs. All other assessment performed by interactions with patient. Lung sounds diminished, strong cough, bowel sounds hypoactive. Pt being checked and changed q2h or more frequently. Pt bathed with chlorhexidine solution, linens changed, and gown changed.

## 2019-04-22 NOTE — CARE COORDINATION
Spoke with Hussain Niño at [facility] this AM - waiting for final approval from her DON but anticipates admission will be approved and patient can transfer this afternoon.

## 2019-04-22 NOTE — DISCHARGE INSTR - COC
Continuity of Care Form    Patient Name: Abdi Simms   :  1970  MRN:  [id number]    Admit date:  2019  Discharge date: 2019    Code Status Order: [facility]   Advance Directives:   [facility] Documentation     Date/Time Healthcare Directive Type of Healthcare Directive Copy in [facility] [address] Agent's Name Healthcare Agent's Phone Number    [phone]  Unknown, patient unable to respond due to medical condition -- -- -- -- --          Admitting Physician:  Miriam Sanchez MD  PCP: No primary care provider on file. Discharging Nurse: [facility] Unit/Room#: 6050/3962-11  Discharging Unit Phone Number: ***    Emergency Contact:   Extended Emergency Contact Information  Primary Emergency Contact: Elle Woods  Home Phone: [phone]  Relation: Other  Secondary Emergency Contact: S, healthcare  Work Phone: [phone]  Relation: Lay Caregiver  Other needs: Other   needed? No    Past Surgical History:  No past surgical history on file. Immunization History: There is no immunization history on file for this patient.     Active Problems:  Patient Active Problem List   Diagnosis Code    Subdural hematoma ([address] S06.5X9A    Severe malnutrition ([address] E43    Hospice care Z51.5    Altered mental status R41.82       Isolation/Infection:   Isolation          No Isolation            Nurse Assessment:  Last Vital Signs: /89   Pulse 64   Temp 97 °F (36.1 °C) (Temporal)   Resp 18   Wt 101 lb 13.6 oz (46.2 kg)   SpO2 100%   BMI 15.49 kg/m²     Last documented pain score (0-10 scale): Pain Level: 0  Last Weight:   Wt Readings from Last 1 Encounters:   19 101 lb 13.6 oz (46.2 kg)     Mental Status:  Nonverbal, alert    IV Access:  - None    Nursing Mobility/ADLs:  Walking   Dependent  Transfer  Dependent  Bathing  Dependent  Dressing  Dependent  Toileting  Dependent  Feeding  Dependent  Med Admin  Dependent  Med Delivery   crushed    Wound Care Documentation and Therapy:        Elimination:  Continence:   · Bowel: No  · Bladder: No  Urinary Catheter: None   Colostomy/Ileostomy/Ileal Conduit: No       Date of Last BM://***//    Intake/Output Summary (Last 24 hours) at 4/22/2019 0839  Last data filed at 4/22/2019 0356  Gross per 24 hour   Intake 886 ml   Output --   Net 886 ml     I/O last 3 completed shifts: In: 886 [P.O.:270; I.V.:616]  Out: -     Safety Concerns:     [address] ELYSE Safety Concerns:[id number]}    Impairments/Disabilities:      Speech and Contractures - fully contracted    Nutrition Therapy:  Current Nutrition Therapy:   - Oral Diet:  Dysphagia 1 pureed    Routes of Feeding: Oral  Liquids: Honey Thick Liquids  Daily Fluid Restriction: no  Last Modified Barium Swallow with Video (Video Swallowing Test):done on 4/15/19    Treatments at the Time of Hospital Discharge:   Respiratory Treatments: none  Oxygen Therapy:  is not on home oxygen therapy.   Ventilator:    - No ventilator support    Rehab Therapies: Speech/Language Therapy  Weight Bearing Status/Restrictions: No weight bearing restirctions  Other Medical Equipment (for information only, NOT a DME order):  wheelchair and hospital bed  Other Treatments: he likes spiderman, pink panther, and old shows    Patient's personal belongings (please select all that are sent with patient):  None    RN SIGNATURE:  Electronically signed by Kayla Mathews RN on 3/56/92 at 11:03 AM    CASE MANAGEMENT/SOCIAL WORK SECTION    Inpatient Status Date:     Readmission Risk Assessment Score:  Readmission Risk              Risk of Unplanned Readmission:        8           Discharging to Facility/ Agency   · Name:   [facility]  · [address] [place] [address]  · Phone:   [phone]  · Fax:   [phone]    / signature: Electronically signed by Goldy Vasquez RN on 4/22/19 at 11:38 AM    PHYSICIAN SECTION    Prognosis: Poor    Condition at Discharge: Stable    Rehab Potential (if transferring to Rehab): Poor    Recommended Labs or Other Treatments After Discharge:     Comfort/hospice care    Physician Certification: I certify the above information and transfer of Ginny Little  is necessary for the continuing treatment of the diagnosis listed and that he requires Hospice for less 30 days.      Update Admission H&P: No change in H&P    PHYSICIAN SIGNATURE:  Electronically signed by James Hodge MD on 4/22/19 at 8:40 AM

## 2019-04-22 NOTE — PROGRESS NOTES
Pt wheeled around unit in wheel chair. Back in bed. Two episodes of incontinece this shift. Ativan given at this time, late for schedule but so that it would be in his system for transport. Guardian updated.

## 2019-04-22 NOTE — DISCHARGE SUMMARY
Hospital Medicine Discharge Summary      Patient ID: Melchor Boogie      Patient's PCP: No primary care provider on file. Admit Date: 4/19/2019     Discharge Date:  4/22/2019     Admitting Physician: Aj Jimenez MD    Discharge Physician: Radha Basilio MD     Discharge Diagnoses: Active Hospital Problems    Diagnosis Date Noted    Altered mental status [R41.82] 04/22/2019    Severe malnutrition ([facility] [E43] 04/13/2019    [facility] [S06.5X9A] 04/12/2019         The patient was seen and examined on day of discharge and this discharge summary is in conjunction with any daily progress note from day of discharge. Hospital Course:     Melchor Boogie is a 46 y.o. male w/ PMH severe MRDD, seizures, schizophrenia, hypothyroidism, BPH who presented initially with increased lethargy, decreased appetite and new bruises. History was very limited given his baseline mental function. Pt had a fall on 4/3  and found to have small SDH and was discharged home. On readmission, head CT was repeated which showed worsening of SDH. Patient was transferred to [facility] for neurosurgical evaluation. He was not a candidate for evacuation. He remained mostly lethargic and totally dependent on assistance with all ADLs. Patient's guarding was contacted and elected to proceed with comfort care and hospice. He was accepted with [facility] hospice agency and was discharge to [address] [place] [address].        Disposition: hospice    Discharged Condition: Stable    Code Status: DNR-CC    Activity: activity as tolerated    Diet: regular diet    Follow Up: Primary Care Physician in one week    Exam:     General appearance: comfortable, very thin, chronically ill appearing, laying in fetal position in bed  Respiratory:  clear to  auscultation , accessory muscle use no, Rales/Ronchi no  Cardiovascular: Regular rate and rhythm with normal S1/S2 ,  Abdomen: Soft, non-tender, non-distended with normal bowel sounds. Musculoskelatal: muscle wasting is present,  no edema bilaterally. Neurologic:  No verbal, not following commands             Labs: For convenience and continuity at follow-up the following most recent labs are provided:    CBC:   Lab Results   Component Value Date    WBC 2.9 04/19/2019    HGB 12.1 04/19/2019    HCT 36.1 04/19/2019    PLT 86 04/19/2019       RENAL:   Lab Results   Component Value Date     04/19/2019    K 4.2 04/19/2019    K 4.6 04/12/2019     04/19/2019    CO2 28 04/19/2019    BUN 21 04/19/2019    CREATININE 0.6 04/19/2019           Discharge Medications:   Current Discharge Medication List           Details   morphine 20 MG/5ML solution Take 0.5 mLs by mouth every 4 hours as needed for Pain for up to 3 days. Qty: 1 Bottle, Refills: 0    Comments: Reduce doses taken as pain becomes manageable  Associated Diagnoses: Hospice care; Subdural hematoma (HCC)      LORazepam (ATIVAN) 2 MG/ML concentrated solution Take 0.5 mLs by mouth every 6 hours as needed (agitation, seizures) for up to 5 days. Qty: 1 Bottle, Refills: 0    Associated Diagnoses: Hospice care; Subdural hematoma (HCC)              Details   levothyroxine (SYNTHROID) 125 MCG tablet Take 1 tablet by mouth Daily  Qty: 30 tablet, Refills: 0      ARIPiprazole (ABILIFY) 20 MG tablet Take 20 mg by mouth nightly      carBAMazepine (TEGRETOL) 200 MG tablet Take 200 mg by mouth 2 times daily      !! clonazePAM (KLONOPIN) 1 MG tablet Take 0.5 mg by mouth 2 times daily. 0800/1600      !! clonazePAM (KLONOPIN) 1 MG tablet Take 1 mg by mouth nightly.       !! divalproex (DEPAKOTE SPRINKLES) 125 MG capsule Take 250 mg by mouth daily      !! divalproex (DEPAKOTE SPRINKLES) 125 MG capsule Take 500 mg by mouth nightly      fluticasone (FLONASE) 50 MCG/ACT nasal spray 1 spray by Each Nare route daily      lactulose (CHRONULAC) 10 GM/15ML solution Take 20 g by mouth 2 times daily      levETIRAcetam (KEPPRA) 500 MG tablet Take 500 mg by mouth 2 times daily      mirtazapine (REMERON) 30 MG tablet Take 30 mg by mouth nightly      polyethylene glycol (GLYCOLAX) packet Take 17 g by mouth daily      tamsulosin (FLOMAX) 0.4 MG capsule Take 0.4 mg by mouth daily      acetaminophen (TYLENOL) 500 MG tablet Take 500 mg by mouth every 6 hours as needed for Pain       !! - Potential duplicate medications found. Please discuss with provider. Time Spent on discharge is more than 30 minutes in the examination, evaluation, counseling and review of medications and discharge plan. Signed:  Kasia Magana MD   4/22/2019      Thank you No primary care provider on file. for the opportunity to be involved in this patient's care. If you have any questions or concerns please feel free to contact me at [phone].

## 2020-10-23 ENCOUNTER — APPOINTMENT (OUTPATIENT)
Dept: GENERAL RADIOLOGY | Age: 50
End: 2020-10-23
Payer: MEDICARE

## 2020-10-23 ENCOUNTER — HOSPITAL ENCOUNTER (EMERGENCY)
Age: 50
Discharge: HOME | End: 2020-10-24
Payer: MEDICARE

## 2020-10-23 LAB
A/G RATIO: 0.9 (ref 1.1–2.2)
ALBUMIN SERPL-MCNC: 3.5 G/DL (ref 3.4–5)
ALP BLD-CCNC: 94 U/L (ref 40–129)
ALT SERPL-CCNC: 20 U/L (ref 10–40)
ANION GAP SERPL CALCULATED.3IONS-SCNC: 11 MMOL/L (ref 3–16)
AST SERPL-CCNC: 34 U/L (ref 15–37)
BACTERIA: ABNORMAL /HPF
BASOPHILS ABSOLUTE: 0 K/UL (ref 0–0.2)
BASOPHILS RELATIVE PERCENT: 0.7 %
BILIRUB SERPL-MCNC: <0.2 MG/DL (ref 0–1)
BILIRUBIN URINE: NEGATIVE
BLOOD, URINE: NEGATIVE
BUN BLDV-MCNC: 29 MG/DL (ref 7–20)
CALCIUM SERPL-MCNC: 8.4 MG/DL (ref 8.3–10.6)
CHLORIDE BLD-SCNC: 107 MMOL/L (ref 99–110)
CLARITY: ABNORMAL
CO2: 27 MMOL/L (ref 21–32)
COLOR: YELLOW
CREAT SERPL-MCNC: 1.1 MG/DL (ref 0.9–1.3)
EOSINOPHILS ABSOLUTE: 0 K/UL (ref 0–0.6)
EOSINOPHILS RELATIVE PERCENT: 0.2 %
EPITHELIAL CELLS, UA: 0 /HPF (ref 0–5)
GFR AFRICAN AMERICAN: >60
GFR NON-AFRICAN AMERICAN: >60
GLOBULIN: 3.7 G/DL
GLUCOSE BLD-MCNC: 75 MG/DL (ref 70–99)
GLUCOSE URINE: NEGATIVE MG/DL
HCT VFR BLD CALC: 35.3 % (ref 40.5–52.5)
HEMOGLOBIN: 11.7 G/DL (ref 13.5–17.5)
HYALINE CASTS: 1 /LPF (ref 0–8)
KEPPRA DOSE AMT: NORMAL
KEPPRA: 15.9 UG/ML (ref 6–46)
KETONES, URINE: NEGATIVE MG/DL
LACTATE DEHYDROGENASE: 212 U/L (ref 100–190)
LACTIC ACID: 1.3 MMOL/L (ref 0.4–2)
LEUKOCYTE ESTERASE, URINE: ABNORMAL
LYMPHOCYTES ABSOLUTE: 0.5 K/UL (ref 1–5.1)
LYMPHOCYTES RELATIVE PERCENT: 14.2 %
MAGNESIUM: 2.4 MG/DL (ref 1.8–2.4)
MCH RBC QN AUTO: 32.6 PG (ref 26–34)
MCHC RBC AUTO-ENTMCNC: 33.3 G/DL (ref 31–36)
MCV RBC AUTO: 98 FL (ref 80–100)
MICROSCOPIC EXAMINATION: YES
MONOCYTES ABSOLUTE: 0.5 K/UL (ref 0–1.3)
MONOCYTES RELATIVE PERCENT: 13.8 %
NEUTROPHILS ABSOLUTE: 2.4 K/UL (ref 1.7–7.7)
NEUTROPHILS RELATIVE PERCENT: 71.1 %
NITRITE, URINE: POSITIVE
PDW BLD-RTO: 15.1 % (ref 12.4–15.4)
PH UA: 5.5 (ref 5–8)
PLATELET # BLD: 72 K/UL (ref 135–450)
PLATELET SLIDE REVIEW: ABNORMAL
PMV BLD AUTO: 10.7 FL (ref 5–10.5)
POTASSIUM SERPL-SCNC: 4.5 MMOL/L (ref 3.5–5.1)
PROTEIN UA: ABNORMAL MG/DL
RBC # BLD: 3.6 M/UL (ref 4.2–5.9)
RBC UA: 1 /HPF (ref 0–4)
SLIDE REVIEW: ABNORMAL
SODIUM BLD-SCNC: 145 MMOL/L (ref 136–145)
SPECIFIC GRAVITY UA: 1.01 (ref 1–1.03)
TOTAL CK: 104 U/L (ref 39–308)
TOTAL PROTEIN: 7.2 G/DL (ref 6.4–8.2)
TROPONIN: <0.01 NG/ML
URINE REFLEX TO CULTURE: YES
URINE TYPE: ABNORMAL
UROBILINOGEN, URINE: 1 E.U./DL
VALPROIC ACID LEVEL: 33.7 UG/ML (ref 50–100)
WBC # BLD: 3.3 K/UL (ref 4–11)
WBC UA: 26 /HPF (ref 0–5)

## 2020-10-23 PROCEDURE — 84484 ASSAY OF TROPONIN QUANT: CPT

## 2020-10-23 PROCEDURE — 96375 TX/PRO/DX INJ NEW DRUG ADDON: CPT

## 2020-10-23 PROCEDURE — 82550 ASSAY OF CK (CPK): CPT

## 2020-10-23 PROCEDURE — 80177 DRUG SCRN QUAN LEVETIRACETAM: CPT

## 2020-10-23 PROCEDURE — 83735 ASSAY OF MAGNESIUM: CPT

## 2020-10-23 PROCEDURE — 6360000002 HC RX W HCPCS: Performed by: PHYSICIAN ASSISTANT

## 2020-10-23 PROCEDURE — 87077 CULTURE AEROBIC IDENTIFY: CPT

## 2020-10-23 PROCEDURE — 85025 COMPLETE CBC W/AUTO DIFF WBC: CPT

## 2020-10-23 PROCEDURE — 96372 THER/PROPH/DIAG INJ SC/IM: CPT

## 2020-10-23 PROCEDURE — 87086 URINE CULTURE/COLONY COUNT: CPT

## 2020-10-23 PROCEDURE — 80053 COMPREHEN METABOLIC PANEL: CPT

## 2020-10-23 PROCEDURE — 71045 X-RAY EXAM CHEST 1 VIEW: CPT

## 2020-10-23 PROCEDURE — U0003 INFECTIOUS AGENT DETECTION BY NUCLEIC ACID (DNA OR RNA); SEVERE ACUTE RESPIRATORY SYNDROME CORONAVIRUS 2 (SARS-COV-2) (CORONAVIRUS DISEASE [COVID-19]), AMPLIFIED PROBE TECHNIQUE, MAKING USE OF HIGH THROUGHPUT TECHNOLOGIES AS DESCRIBED BY CMS-2020-01-R: HCPCS

## 2020-10-23 PROCEDURE — 87186 SC STD MICRODIL/AGAR DIL: CPT

## 2020-10-23 PROCEDURE — 83615 LACTATE (LD) (LDH) ENZYME: CPT

## 2020-10-23 PROCEDURE — 99284 EMERGENCY DEPT VISIT MOD MDM: CPT

## 2020-10-23 PROCEDURE — 93005 ELECTROCARDIOGRAM TRACING: CPT | Performed by: PHYSICIAN ASSISTANT

## 2020-10-23 PROCEDURE — 80164 ASSAY DIPROPYLACETIC ACD TOT: CPT

## 2020-10-23 PROCEDURE — U0002 COVID-19 LAB TEST NON-CDC: HCPCS

## 2020-10-23 PROCEDURE — 96374 THER/PROPH/DIAG INJ IV PUSH: CPT

## 2020-10-23 PROCEDURE — 6360000002 HC RX W HCPCS: Performed by: EMERGENCY MEDICINE

## 2020-10-23 PROCEDURE — 96376 TX/PRO/DX INJ SAME DRUG ADON: CPT

## 2020-10-23 PROCEDURE — 81001 URINALYSIS AUTO W/SCOPE: CPT

## 2020-10-23 PROCEDURE — 83605 ASSAY OF LACTIC ACID: CPT

## 2020-10-23 RX ORDER — DIPHENHYDRAMINE HYDROCHLORIDE 50 MG/ML
50 INJECTION INTRAMUSCULAR; INTRAVENOUS ONCE
Status: COMPLETED | OUTPATIENT
Start: 2020-10-23 | End: 2020-10-23

## 2020-10-23 RX ORDER — LORAZEPAM 2 MG/ML
1 INJECTION INTRAMUSCULAR ONCE
Status: COMPLETED | OUTPATIENT
Start: 2020-10-23 | End: 2020-10-23

## 2020-10-23 RX ORDER — CEPHALEXIN 500 MG/1
500 CAPSULE ORAL 4 TIMES DAILY
Qty: 40 CAPSULE | Refills: 0 | Status: SHIPPED | OUTPATIENT
Start: 2020-10-23

## 2020-10-23 RX ORDER — HALOPERIDOL 5 MG/ML
5 INJECTION INTRAMUSCULAR ONCE
Status: COMPLETED | OUTPATIENT
Start: 2020-10-23 | End: 2020-10-23

## 2020-10-23 RX ORDER — AZITHROMYCIN 250 MG/1
TABLET, FILM COATED ORAL
Qty: 6 TABLET | Refills: 0 | Status: SHIPPED | OUTPATIENT
Start: 2020-10-23 | End: 2020-11-02

## 2020-10-23 RX ORDER — HALOPERIDOL 5 MG/ML
10 INJECTION INTRAMUSCULAR ONCE
Status: DISCONTINUED | OUTPATIENT
Start: 2020-10-23 | End: 2020-10-23

## 2020-10-23 RX ADMIN — LORAZEPAM 1 MG: 2 INJECTION INTRAMUSCULAR; INTRAVENOUS at 16:40

## 2020-10-23 RX ADMIN — DIPHENHYDRAMINE HYDROCHLORIDE 50 MG: 50 INJECTION, SOLUTION INTRAMUSCULAR; INTRAVENOUS at 16:56

## 2020-10-23 RX ADMIN — Medication 1 G: at 16:40

## 2020-10-23 RX ADMIN — HALOPERIDOL LACTATE 5 MG: 5 INJECTION, SOLUTION INTRAMUSCULAR at 16:56

## 2020-10-23 RX ADMIN — LORAZEPAM 1 MG: 2 INJECTION, SOLUTION INTRAMUSCULAR; INTRAVENOUS at 23:24

## 2020-10-23 NOTE — ED NOTES
Another message left for home care. No caregiver has arrived for this pt.        Desmond Hubbard RN  10/23/20 8204

## 2020-10-23 NOTE — ED PROVIDER NOTES
[facility]        Pt Name: Anitra Marinelli  MRN: [id number]  [place] 1970  Date of evaluation: 10/23/2020  Provider: Tomasz Luna PA-C  PCP: Sherita MARIN. I have evaluated this patient. My supervising physician was available for consultation. CHIEF COMPLAINT       Chief Complaint   Patient presents with    Cough     Pt in by squad from home for COVID like symptoms after exposure. Cough present as well. Home health at home       HISTORY OF PRESENT ILLNESS   (Location, Timing/Onset, Context/Setting, Quality, Duration, Modifying Factors, Severity, Associated Signs and Symptoms)  Note limiting factors. Anitra Marinelli is a 44 y.o. male that presents to the emergency department with apparently a cough for 2 weeks per EMS and recent exposure to COVID-19. Apparently lives in a home with a home health aide and guardian is the state. Patient has history of schizophrenia and severe MRMonica Unable to obtain any history from him. Nursing staff attempted to contact home health care and unable to get in contact with anyone at this time. Nursing Notes were all reviewed and agreed with or any disagreements were addressed in the HPI. REVIEW OF SYSTEMS    (2-9 systems for level 4, 10 or more for level 5)     Review of Systems    Positives and Pertinent negatives as per HPI. Except as noted above in the ROS, all other systems were reviewed and negative.        PAST MEDICAL HISTORY     Past Medical History:   Diagnosis Date    BPH (benign prostatic hyperplasia)     Dysphagia     GERD (gastroesophageal reflux disease)     Insomnia     Mental retardation, severe (I.Q. 20-34)     Schizophrenia (HCC)     Seizures (HCC)     Thyroid disease     Unspecified diseases of blood and blood-forming organs     thrombocytopenia         SURGICAL HISTORY     Past Surgical History:   Procedure Laterality Date    FRACTURE Nerves: No cranial nerve deficit.    Psychiatric:         Behavior: Behavior normal.         DIAGNOSTIC RESULTS   LABS:    Labs Reviewed   CBC WITH AUTO DIFFERENTIAL - Abnormal; Notable for the following components:       Result Value    WBC 3.3 (*)     RBC 3.60 (*)     Hemoglobin 11.7 (*)     Hematocrit 35.3 (*)     Platelets 72 (*)     MPV 10.7 (*)     Lymphocytes Absolute 0.5 (*)     All other components within normal limits    Narrative:     Performed at:  [facility] [address]   Phone [phone]   COMPREHENSIVE METABOLIC PANEL - Abnormal; Notable for the following components:    BUN 29 (*)     Albumin/Globulin Ratio 0.9 (*)     All other components within normal limits    Narrative:     Performed at:  [facility] [address]   Phone [phone]   LACTATE DEHYDROGENASE - Abnormal; Notable for the following components:     (*)     All other components within normal limits    Narrative:     Performed at:  [facility] [address]   Phone [phone]   URINE RT REFLEX TO CULTURE - Abnormal; Notable for the following components:    Clarity, UA CLOUDY (*)     Protein, UA TRACE (*)     Nitrite, Urine POSITIVE (*)     Leukocyte Esterase, Urine SMALL (*)     All other components within normal limits    Narrative:     Performed at:  [facility] [address]   Phone [phone]   MICROSCOPIC URINALYSIS - Abnormal; Notable for the following components:    Bacteria, UA 3+ (*)     WBC, UA 26 (*)     All other components within normal limits    Narrative:     Performed at:  [facility] [address]   Phone [phone]   VALPROIC ACID LEVEL, TOTAL - Abnormal; Notable for the following components:    Valproic Acid Lvl 33.7 (*)     All other components within normal limits    Narrative:     Performed at:  [facility]  [address] [facility],  [place], [address]   Phone [phone]   CULTURE, URINE   TROPONIN    Narrative:     Performed at:  [facility]  [address] [facility],  [place], [address]   Phone [phone]   CK    Narrative:     Performed at:  [facility]  [address] [facility],  [place], [address]   Phone [phone]   MAGNESIUM    Narrative:     Performed at:  [facility] [address] [facility],  [place], [address]   Phone [phone]   LACTIC ACID, PLASMA    Narrative:     Performed at:  [facility]  [address] [facility],  [place], [address]   Phone [phone]   LEVETIRACETAM LEVEL    Narrative:     Performed at:  [facility]  [address] [place], [place] [address]   Phone [phone]       All other labs were within normal range or not returned as of this dictation. EKG: All EKG's are interpreted by the Emergency Department Physician in the absence of a cardiologist.  Please see their note for interpretation of EKG. RADIOLOGY:   Non-plain film images such as CT, Ultrasound and MRI are read by the radiologist. Plain radiographic images are visualized and preliminarily interpreted by the ED Provider with the below findings:        Interpretation per the Radiologist below, if available at the time of this note:    XR CHEST PORTABLE   Final Result   Limited by positioning. Faint ground-glass opacities in the lung bases, nonspecific for atelectasis   or atypical/typical pneumonia.            Xr Chest Portable    Result Date: 10/23/2020  EXAMINATION: ONE XRAY VIEW OF THE CHEST 10/23/2020 4:48 pm COMPARISON: 03/29/2019 HISTORY: ORDERING SYSTEM PROVIDED HISTORY: SOB TECHNOLOGIST PROVIDED HISTORY: Reason for exam:->SOB Reason for Exam: Cough (Pt in by squad from home for COVID like symptoms after exposure. Cough present as well. Home health at home) Acuity: Acute Type of Exam: Unknown FINDINGS: Patient is rotated. Cardiac silhouette, mediastinal hilar contours are normal.  There is subtle ground-glass opacities in the lung bases. No dense consolidation, pleural effusion or pneumothorax is seen. Limited by positioning. Faint ground-glass opacities in the lung bases, nonspecific for atelectasis or atypical/typical pneumonia. PROCEDURES   Unless otherwise noted below, none     Procedures    CRITICAL CARE TIME   N/A    CONSULTS:  None      EMERGENCY DEPARTMENT COURSE and DIFFERENTIAL DIAGNOSIS/MDM:   Vitals:    Vitals:    10/23/20 1615 10/23/20 1700 10/23/20 1757 10/23/20 2045   BP: 110/63 (!) 106/57     Pulse: 69 61 64 63   Resp: 13 20 19 17   Temp:       TempSrc:       SpO2:  97% 100%    Weight:           Patient was given the following medications:  Medications   cefTRIAXone (ROCEPHIN) 1 g in sterile water 10 mL IV syringe (1 g Intravenous Given 10/23/20 1640)   LORazepam (ATIVAN) injection 1 mg (1 mg Intravenous Given 10/23/20 1640)   diphenhydrAMINE (BENADRYL) injection 50 mg (50 mg Intravenous Given 10/23/20 1656)   haloperidol lactate (HALDOL) injection 5 mg (5 mg Intramuscular Given 10/23/20 1656)           Patient presented with with cough and Covid exposure. Patient has history of severe MR and unable to get any history from him. We attempted to contact multiple people and unable to initially get in contact with him. Initially after work-up we were able to discuss this with home care services. Patient lives in a private home and apparently multiple caregivers are Covid positive and they were sent here for Covid testing. Most likely has COVID-19 but he is not requiring any oxygen laboratory testing unremarkable. Secondarily also has urinary tract infection.   LDH is only minimally elevated but he is not requiring any oxygen. Do not believe he requires admission to the hospital.  After multiple conversations back-and-forth we are able to get the patient care at home that he needs through home services as they were initially concerned about having the appropriate care for someone with COVID-19. Patient will be discharged home with Keflex for UTI. Was given dose of Rocephin here. He did require some Ativan, Haldol and Benadryl as he is attempting to get out of bed and yelling and agitated. Caregiver later presented to hospital and states he is at his baseline. Can return here for any worsening symptoms or problems no. Low suspicion for pneumonia requiring IV antibiotics, pulmonary embolus, bacteremia or other emergent etiology. FINAL IMPRESSION      1. Suspected COVID-19 virus infection    2. Exposure to COVID-19 virus    3.  Acute cystitis without hematuria          DISPOSITION/PLAN   DISPOSITION Decision To Discharge 10/23/2020 06:23:25 PM      PATIENT REFERREDTO:  Dominick Stephenson    Schedule an appointment as soon as possible for a visit in 3 days  For re-check    [facility] Emergency Department  [address] [place] [address]  [phone]    As needed      DISCHARGE MEDICATIONS:  New Prescriptions    AZITHROMYCIN (ZITHROMAX) 250 MG TABLET    2 TABS DAY 1 THEN 1 TAB DAYS 2-5    CEPHALEXIN (KEFLEX) 500 MG CAPSULE    Take 1 capsule by mouth 4 times daily       DISCONTINUED MEDICATIONS:  Discontinued Medications    No medications on file              (Please note that portions of this note were completed with a voice recognition program.  Efforts were made to edit the dictations but occasionally words are mis-transcribed.)    Joaquin العراقي PA-C (electronically signed)           Joaquin العراقي PA-C  10/23/20 7602

## 2020-10-23 NOTE — ED NOTES
After speaking with [employer], a staff member showed up to be with the patient. The staff member states that he will be able to go home with the patient and will be at the home with him. Transport set up for patient.        Renee Barillas RN  10/23/20 7627

## 2020-10-23 NOTE — ED NOTES
MELYSSA returned my call.   Numbers for the [facility] of [facility]/ODELL Aguilera RN  10/23/20 1711

## 2020-10-23 NOTE — ED NOTES
Pt yelling, agitated and attempting to crawl out of bed. Stayed with pt for safety. SAAD Alvarez notified. Brief changed pericare performed. Pt resting calmly in bed at this time.       Ac Steele  10/23/20 7243

## 2020-10-24 VITALS
SYSTOLIC BLOOD PRESSURE: 95 MMHG | DIASTOLIC BLOOD PRESSURE: 73 MMHG | OXYGEN SATURATION: 98 % | TEMPERATURE: 98.7 F | WEIGHT: 125 LBS | BODY MASS INDEX: 16.95 KG/M2 | HEART RATE: 54 BPM | RESPIRATION RATE: 15 BRPM

## 2020-10-24 LAB
EKG ATRIAL RATE: 63 BPM
EKG DIAGNOSIS: NORMAL
EKG P AXIS: 64 DEGREES
EKG P-R INTERVAL: 134 MS
EKG Q-T INTERVAL: 370 MS
EKG QRS DURATION: 82 MS
EKG QTC CALCULATION (BAZETT): 378 MS
EKG R AXIS: 54 DEGREES
EKG T AXIS: 60 DEGREES
EKG VENTRICULAR RATE: 63 BPM
SARS-COV-2, PCR: DETECTED

## 2020-10-24 PROCEDURE — 93010 ELECTROCARDIOGRAM REPORT: CPT | Performed by: INTERNAL MEDICINE

## 2020-10-24 PROCEDURE — 96375 TX/PRO/DX INJ NEW DRUG ADDON: CPT

## 2020-10-24 PROCEDURE — 96372 THER/PROPH/DIAG INJ SC/IM: CPT

## 2020-10-24 PROCEDURE — 96376 TX/PRO/DX INJ SAME DRUG ADON: CPT

## 2020-10-24 PROCEDURE — 6360000002 HC RX W HCPCS: Performed by: EMERGENCY MEDICINE

## 2020-10-24 RX ORDER — DIPHENHYDRAMINE HYDROCHLORIDE 50 MG/ML
25 INJECTION INTRAMUSCULAR; INTRAVENOUS ONCE
Status: COMPLETED | OUTPATIENT
Start: 2020-10-24 | End: 2020-10-24

## 2020-10-24 RX ORDER — HALOPERIDOL 5 MG/ML
5 INJECTION INTRAMUSCULAR ONCE
Status: COMPLETED | OUTPATIENT
Start: 2020-10-24 | End: 2020-10-24

## 2020-10-24 RX ORDER — ZIPRASIDONE MESYLATE 20 MG/ML
5 INJECTION, POWDER, LYOPHILIZED, FOR SOLUTION INTRAMUSCULAR ONCE
Status: COMPLETED | OUTPATIENT
Start: 2020-10-24 | End: 2020-10-24

## 2020-10-24 RX ADMIN — HALOPERIDOL LACTATE 5 MG: 5 INJECTION, SOLUTION INTRAMUSCULAR at 01:26

## 2020-10-24 RX ADMIN — ZIPRASIDONE MESYLATE 5 MG: 20 INJECTION, POWDER, LYOPHILIZED, FOR SOLUTION INTRAMUSCULAR at 00:25

## 2020-10-24 RX ADMIN — DIPHENHYDRAMINE HYDROCHLORIDE 25 MG: 50 INJECTION, SOLUTION INTRAMUSCULAR; INTRAVENOUS at 01:26

## 2020-10-24 NOTE — ED NOTES
Spoke with [address] [employer] they are working with the pt caregivers to determine needs for home.       Lian Pinto RN  10/24/20 0425

## 2020-10-24 NOTE — ED NOTES
Pt yelling in bed, seizure pads still in place, pt with sitter Carmela at bedside for safety, bed alarm remains in place. Pt keeps pulling off pulse ox, bp cuff.       Ramy Henriquez RN  10/24/20 8614

## 2020-10-24 NOTE — ED NOTES
Pt yelling loudly, attempts to redirect unsuccessful. Pt ripping of tele leads and BP cuff, pt unable to keep the pulse ox on. Carmela at bedside for pt safety.       Lawanda Juares RN  10/24/20 6521

## 2020-10-24 NOTE — ED NOTES
Pt resting quietly in bed, no s/s of distress noted. Tammy at bedside for safety.       Selvin Mathew RN  10/24/20 0242

## 2020-10-24 NOTE — ED NOTES
Pt in bed with seizure pads in place, pt caregiver out of room, states that he has to leave and someone else should be showing up at some point. [place] tech in room to sit due to pt continually attempting to climb out of bed. Pt is yelling and alert. No s/s of distress.       Heike Guthrie RN  10/24/20 0135

## 2020-10-24 NOTE — ED NOTES
Pt changed into a clean dry depends and pt dressed in clothing from home.       Lian Pinto RN  10/24/20 0373

## 2020-10-24 NOTE — ED NOTES
Pt placed back on tele due to giving IV haldol and the need to monitor pt.       Madhu Bowman RN  10/24/20 4749

## 2020-10-25 LAB
ORGANISM: ABNORMAL
URINE CULTURE, ROUTINE: ABNORMAL

## 2020-10-26 ENCOUNTER — CARE COORDINATION (OUTPATIENT)
Dept: CARE COORDINATION | Age: 50
End: 2020-10-26

## 2020-10-26 NOTE — ED NOTES
[facility]   Emergency Department Culture Follow-Up       Erika Edouard (CSN: [id number]) was seen and evaluated at [facility] Emergency Department on 10/23/20 by provider  DARRELL Coffman. A urine culture was positive and is growing Enterobacter clacae. Sensitivity results: resistant Keflex, sens all else      Treatment Course: The patient was treated and discharged with Keflex. Recommendation: It is recommended to discontinue Keflex and start cefuroxime 500 mg BID for 3 days. This recommendation was reviewed with and agreed by ED provider Dr. Ginny Riley. Follow-Up:    The patient was unable to be contacted so a certified letter was sent to the address listed in the patient's profile.      Thank you,    Shayne Vuong, PharmD  ED Pharmacist [id number]  10/26/2020

## 2020-10-30 ENCOUNTER — CARE COORDINATION (OUTPATIENT)
Dept: CARE COORDINATION | Age: 50
End: 2020-10-30

## 2020-10-30 NOTE — CARE COORDINATION
Outreach call made and no answer. Unable to leave a message with ACM contact information. Received message when call was attempted that the wireless customer that I was attempting to reach was unavailable . This is 2nd attempt at out reach, no further out reach will be made.

## (undated) DEVICE — 3M™ COBAN™ NL STERILE NON-LATEX SELF-ADHERENT WRAP, 2084S, 4 IN X 5 YD (10 CM X 4,5 M), 18 ROLLS/CASE: Brand: 3M™ COBAN™

## (undated) DEVICE — SUTURE VCRL SZ 2-0 L36IN ABSRB UD L36MM CT-1 1/2 CIR J945H

## (undated) DEVICE — 6619 2 PTNT ISO SYS INCISE AREA&LT;(&GT;&&LT;)&GT;P: Brand: STERI-DRAPE™ IOBAN™ 2

## (undated) DEVICE — STAPLER SKIN H3.9MM WIRE DIA0.58MM CRWN 6.9MM 35 STPL ROT

## (undated) DEVICE — ELECTRODE PT RET AD L9FT HI MOIST COND ADH HYDRGEL CORDED

## (undated) DEVICE — K-WIRE

## (undated) DEVICE — GUIDE WIRE, BALL-TIPPED, STERILE

## (undated) DEVICE — SOLUTION IV IRRIG WATER 1000ML POUR BRL 2F7114

## (undated) DEVICE — MAT FLR ABS DISP

## (undated) DEVICE — SOLUTION IV IRRIG POUR BRL 0.9% SODIUM CHL 2F7124

## (undated) DEVICE — SHEET,DRAPE,53X77,STERILE: Brand: MEDLINE

## (undated) DEVICE — SPONGE,LAP,18"X18",DLX,XR,ST,5/PK,40/PK: Brand: MEDLINE

## (undated) DEVICE — MAJOR SET UP PK

## (undated) DEVICE — SUTURE VCRL SZ 0 L36IN ABSRB UD L36MM CT-1 1/2 CIR J946H

## (undated) DEVICE — 3M™ TEGADERM™ TRANSPARENT FILM DRESSING FRAME STYLE, 1628, 6 IN X 8 IN (15 CM X 20 CM), 10/CT 8CT/CASE: Brand: 3M™ TEGADERM™

## (undated) DEVICE — SYRINGE 60ML BULB IRRIG ST LF

## (undated) DEVICE — KIT OR ROOM TURNOVER W/STRAP

## (undated) DEVICE — CONTROL SYRINGE LUER-LOCK TIP: Brand: MONOJECT

## (undated) DEVICE — CHLORAPREP 26ML ORANGE

## (undated) DEVICE — GLOVE ORANGE PI 8 1/2   MSG9085

## (undated) DEVICE — PADDING CAST W6INXL4YD ST COT RAYON MICROPLEATED HIGHLY

## (undated) DEVICE — DRESSING PETRO W3XL8IN N ADH OIL EMUL GZ CURAD

## (undated) DEVICE — LIMB HOLDER, WRIST/ANKLE: Brand: DEROYAL

## (undated) DEVICE — GLOVE SURG SZ 8.5 L11.85IN FNGR THK9.8MIL STRW LTX POLYMER

## (undated) DEVICE — NEEDLE HYPO 22GA L1.5IN BLK POLYPR HUB S STL REG BVL STR

## (undated) DEVICE — DRILL, AO SMALL: Brand: GAMMA